# Patient Record
Sex: FEMALE | Race: BLACK OR AFRICAN AMERICAN | NOT HISPANIC OR LATINO | Employment: OTHER | URBAN - METROPOLITAN AREA
[De-identification: names, ages, dates, MRNs, and addresses within clinical notes are randomized per-mention and may not be internally consistent; named-entity substitution may affect disease eponyms.]

---

## 2017-03-18 ENCOUNTER — HOSPITAL ENCOUNTER (EMERGENCY)
Facility: HOSPITAL | Age: 77
Discharge: HOME | End: 2017-03-18
Attending: FAMILY MEDICINE
Payer: MEDICARE

## 2017-03-18 VITALS
WEIGHT: 228 LBS | HEIGHT: 64 IN | BODY MASS INDEX: 38.93 KG/M2 | HEART RATE: 84 BPM | TEMPERATURE: 98 F | OXYGEN SATURATION: 99 % | DIASTOLIC BLOOD PRESSURE: 83 MMHG | SYSTOLIC BLOOD PRESSURE: 161 MMHG | RESPIRATION RATE: 20 BRPM

## 2017-03-18 DIAGNOSIS — R10.31 RIGHT LOWER QUADRANT ABDOMINAL PAIN: Primary | ICD-10-CM

## 2017-03-18 LAB
ALBUMIN SERPL BCP-MCNC: 4.1 G/DL
ALP SERPL-CCNC: 131 IU/L
ALT SERPL W/O P-5'-P-CCNC: 32 IU/L
AMYLASE SERPL-CCNC: 91 U/L
ANION GAP SERPL CALC-SCNC: 10 MMOL/L
AST SERPL-CCNC: 27 IU/L
BASOPHILS # BLD AUTO: 0.03 K/UL
BASOPHILS NFR BLD: 0.7 %
BILIRUB SERPL-MCNC: 0.6 MG/DL
BILIRUB UR QL STRIP: NEGATIVE
BUN SERPL-MCNC: 13 MG/DL
CALCIUM SERPL-MCNC: 9.1 MG/DL
CHLORIDE SERPL-SCNC: 99 MMOL/L
CLARITY UR REFRACT.AUTO: ABNORMAL
CO2 SERPL-SCNC: 33 MMOL/L
COLOR UR AUTO: YELLOW
CREAT SERPL-MCNC: 0.55 MG/DL
DIFFERENTIAL METHOD: ABNORMAL
EOSINOPHIL # BLD AUTO: 0.1 K/UL
EOSINOPHIL NFR BLD: 3.2 %
ERYTHROCYTE [DISTWIDTH] IN BLOOD BY AUTOMATED COUNT: 15.1 %
EST. GFR  (AFRICAN AMERICAN): >60 ML/MIN/1.73 M^2
EST. GFR  (NON AFRICAN AMERICAN): >60 ML/MIN/1.73 M^2
GLUCOSE SERPL-MCNC: 156 MG/DL
GLUCOSE UR QL STRIP: NEGATIVE
HCT VFR BLD AUTO: 46 %
HGB BLD-MCNC: 14.2 G/DL
HGB UR QL STRIP: NEGATIVE
KETONES UR QL STRIP: NEGATIVE
LEUKOCYTE ESTERASE UR QL STRIP: NEGATIVE
LIPASE SERPL-CCNC: 34 U/L
LYMPHOCYTES # BLD AUTO: 1 K/UL
LYMPHOCYTES NFR BLD: 21.8 %
MCH RBC QN AUTO: 24.6 PG
MCHC RBC AUTO-ENTMCNC: 30.9 %
MCV RBC AUTO: 80 FL
MONOCYTES # BLD AUTO: 0.4 K/UL
MONOCYTES NFR BLD: 8.8 %
NEUTROPHILS # BLD AUTO: 2.9 K/UL
NEUTROPHILS NFR BLD: 65.3 %
NITRITE UR QL STRIP: NEGATIVE
PH UR STRIP: 8 [PH] (ref 5–8)
PLATELET # BLD AUTO: 200 K/UL
PMV BLD AUTO: 11.7 FL
POTASSIUM SERPL-SCNC: 3.4 MMOL/L
PROT SERPL-MCNC: 7.6 G/DL
PROT UR QL STRIP: ABNORMAL
RBC # BLD AUTO: 5.78 M/UL
SODIUM SERPL-SCNC: 142 MMOL/L
SP GR UR STRIP: 1.01 (ref 1–1.03)
URN SPEC COLLECT METH UR: ABNORMAL
UROBILINOGEN UR STRIP-ACNC: NEGATIVE EU/DL
WBC # BLD AUTO: 4.44 K/UL

## 2017-03-18 PROCEDURE — 96374 THER/PROPH/DIAG INJ IV PUSH: CPT

## 2017-03-18 PROCEDURE — 63600175 PHARM REV CODE 636 W HCPCS

## 2017-03-18 PROCEDURE — 83690 ASSAY OF LIPASE: CPT

## 2017-03-18 PROCEDURE — 25500020 PHARM REV CODE 255: Performed by: FAMILY MEDICINE

## 2017-03-18 PROCEDURE — 81003 URINALYSIS AUTO W/O SCOPE: CPT

## 2017-03-18 PROCEDURE — 82150 ASSAY OF AMYLASE: CPT

## 2017-03-18 PROCEDURE — 25000003 PHARM REV CODE 250: Performed by: FAMILY MEDICINE

## 2017-03-18 PROCEDURE — 80053 COMPREHEN METABOLIC PANEL: CPT

## 2017-03-18 PROCEDURE — 85025 COMPLETE CBC W/AUTO DIFF WBC: CPT

## 2017-03-18 PROCEDURE — 99284 EMERGENCY DEPT VISIT MOD MDM: CPT | Mod: 25

## 2017-03-18 RX ORDER — LISINOPRIL 40 MG/1
40 TABLET ORAL DAILY
COMMUNITY
End: 2022-05-21

## 2017-03-18 RX ORDER — ONDANSETRON 2 MG/ML
4 INJECTION INTRAMUSCULAR; INTRAVENOUS
Status: COMPLETED | OUTPATIENT
Start: 2017-03-18 | End: 2017-03-18

## 2017-03-18 RX ORDER — DICYCLOMINE HYDROCHLORIDE 20 MG/1
20 TABLET ORAL 4 TIMES DAILY PRN
Qty: 15 TABLET | Refills: 0 | Status: SHIPPED | OUTPATIENT
Start: 2017-03-18 | End: 2017-04-17

## 2017-03-18 RX ORDER — ONDANSETRON 2 MG/ML
INJECTION INTRAMUSCULAR; INTRAVENOUS
Status: COMPLETED
Start: 2017-03-18 | End: 2017-03-18

## 2017-03-18 RX ORDER — HYDROCHLOROTHIAZIDE 12.5 MG/1
12.5 TABLET ORAL DAILY
COMMUNITY
End: 2018-06-03

## 2017-03-18 RX ORDER — DICYCLOMINE HYDROCHLORIDE 10 MG/1
20 CAPSULE ORAL
Status: COMPLETED | OUTPATIENT
Start: 2017-03-18 | End: 2017-03-18

## 2017-03-18 RX ORDER — AMLODIPINE BESYLATE 5 MG/1
10 TABLET ORAL DAILY
Status: ON HOLD | COMMUNITY
End: 2021-06-16

## 2017-03-18 RX ADMIN — ONDANSETRON 4 MG: 2 INJECTION INTRAMUSCULAR; INTRAVENOUS at 07:03

## 2017-03-18 RX ADMIN — IOHEXOL 100 ML: 350 INJECTION, SOLUTION INTRAVENOUS at 08:03

## 2017-03-18 RX ADMIN — IOHEXOL 30 ML: 300 INJECTION, SOLUTION INTRAVENOUS at 07:03

## 2017-03-18 RX ADMIN — DICYCLOMINE HYDROCHLORIDE 20 MG: 10 CAPSULE ORAL at 09:03

## 2017-03-18 NOTE — ED AVS SNAPSHOT
[facility]  [address] [place] [address]               Jayne You   3/18/2017  6:14 PM   ED    Description:  Female : 1940   Department:  [facility]           Your Care was Coordinated By:     Provider Role From To    Parvez Ball MD Attending Provider [phone] --      Reason for Visit     Abdominal Pain           Diagnoses this Visit        Comments    Right lower quadrant abdominal pain    -  Primary       ED Disposition     None           To Do List           Follow-up Information     Follow up with Thaddeus Cross MD. Schedule an appointment as soon as possible for a visit in 2 days.    Specialty:  Internal Medicine    Why:  If symptoms worsen    Contact information:    [address] [place] [address]  [phone]         These Medications        Disp Refills Start End    dicyclomine (BENTYL) 20 mg tablet 15 tablet 0 3/18/2017 2017    Take 1 tablet (20 mg total) by mouth 4 (four) times daily as needed (CRAMPS). - Oral      [facility] On [facility]     [facility] -  Assistance  Registered nurses in the [facility] provide clinical advisement, health education, appointment booking, and other advisory services.  Call for this free service at [phone].             Medications           Message regarding Medications     Verify the changes and/or additions to your medication regime listed below are the same as discussed with your clinician today.  If any of these changes or additions are incorrect, please notify your healthcare provider.        START taking these NEW medications        Refills    dicyclomine (BENTYL) 20 mg tablet 0    Sig: Take 1 tablet (20 mg total) by mouth 4 (four) times daily as needed (CRAMPS).    Class: Print    Route: Oral      These medications were administered today        Dose Freq    omnipaque 300 iohexol 30 mL 30 mL IMG once as needed    Sig:  "Take 30 mLs by mouth ONCE PRN for contrast.    Class: Normal    Route: Oral    omnipaque 350 iohexol 100 mL 100 mL IMG once as needed    Sig: Inject 100 mLs into the vein ONCE PRN for contrast.    Class: Normal    Route: Intravenous    ondansetron injection 4 mg 4 mg ED 1 Time    Sig: Inject 4 mg into the vein ED 1 Time.    Class: Normal    Route: Intravenous    ondansetron 4 mg/2 mL injection      Notes to Pharmacy: Created by cabinet override    dicyclomine capsule 20 mg 20 mg ED 1 Time    Sig: Take 2 capsules (20 mg total) by mouth ED 1 Time.    Class: Normal    Route: Oral           Verify that the below list of medications is an accurate representation of the medications you are currently taking.  If none reported, the list may be blank. If incorrect, please contact your healthcare provider. Carry this list with you in case of emergency.           Current Medications     amlodipine (NORVASC) 5 MG tablet Take 5 mg by mouth once daily.    hydrochlorothiazide (HYDRODIURIL) 12.5 MG Tab Take 12.5 mg by mouth once daily.    lisinopril (PRINIVIL,ZESTRIL) 40 MG tablet Take 40 mg by mouth once daily.    dicyclomine (BENTYL) 20 mg tablet Take 1 tablet (20 mg total) by mouth 4 (four) times daily as needed (CRAMPS).    dicyclomine capsule 20 mg Take 2 capsules (20 mg total) by mouth ED 1 Time.           Clinical Reference Information           Your Vitals Were     BP Pulse Temp Resp Height Weight    161/83 84 98.1 °F (36.7 °C) 20 5' 4" (1.626 m) 103.4 kg (228 lb)    SpO2 BMI             99% 39.14 kg/m2         Allergies as of 3/18/2017        Reactions    Codeine Nausea And Vomiting      Immunizations Administered on Date of Encounter - 3/18/2017     None      ED Micro, Lab, POCT     Start Ordered       Status Ordering Provider    03/18/17 1912 03/18/17 1912  CBC auto differential  STAT      Final result     03/18/17 1912 03/18/17 1912  Comprehensive metabolic panel  STAT      Final result     03/18/17 1912 03/18/17 1912   "  STAT,   Status:  Canceled      Canceled     03/18/17 1912 03/18/17 1912  Amylase  STAT      Final result     03/18/17 1912 03/18/17 1912  Lipase  STAT      Final result     03/18/17 1848 03/18/17 1847  Urinalysis Clean Catch  STAT      Final result       ED Imaging Orders     Start Ordered       Status Ordering Provider    03/18/17 1912 03/18/17 1912  CT Abdomen Pelvis With Contrast  1 time imaging      Final result         Discharge Instructions         Abdominal Pain    Abdominal pain is pain in the stomach or belly area. Everyone has this pain from time to time. In many cases it goes away on its own. But abdominal pain can sometimes be due to a serious problem, such as appendicitis. So its important to know when to seek help.  Causes of abdominal pain  There are many possible causes of abdominal pain. Common causes in adults include:  · Constipation, diarrhea, or gas  · Stomach acid flowing back up into the esophagus (acid reflux or heartburn)  · Severe acid reflux, called GERD (gastroesophageal reflux disease)  · A sore in the lining of the stomach or small intestine (peptic ulcer)  · Inflammation of the gallbladder, liver, or pancreas  · Gallstones or kidney stones  · Appendicitis   · Intestinal blockage   · An internal organ pushing through a muscle or other tissue (hernia)  · Urinary tract infections  · In women, menstrual cramps, fibroids, or endometriosis  · Inflammation or infection of the intestines  Diagnosing the cause of abdominal pain  Your healthcare provider will do a physical exam help find the cause of your pain. If needed, tests will be ordered. Belly pain has many possible causes. So it can be hard to find the reason for your pain. Giving details about your pain can help. Tell your provider where and when you feel the pain, and what makes it better or worse. Also let your provider know if you have other symptoms such as:  · Fever  · Tiredness  · Upset stomach (nausea)  · Vomiting  · Changes in  bathroom habits  Treating abdominal pain  Some causes of pain need emergency medical treatment right away. These include appendicitis or a bowel blockage. Other problems can be treated with rest, fluids, or medicines. Your healthcare provider can give you specific instructions for treatment or self-care based on what is causing your pain.  If you have vomiting or diarrhea, sip water or other clear fluids. When you are ready to eat solid foods again, start with small amounts of easy-to-digest, low-fat foods. These include apple sauce, toast, or crackers.   When to seek medical care  Call 911 or go to the hospital right away if you:  · Cant pass stool and are vomiting  · Are vomiting blood or have bloody diarrhea or black, tarry diarrhea  · Have chest, neck, or shoulder pain  · Feel like you might pass out  · Have pain in your shoulder blades with nausea  · Have sudden, severe belly pain  · Have new, severe pain unlike any you have felt before  · Have a belly that is rigid, hard, and tender to touch  Call your healthcare provider if you have:  · Pain for more than 5 days  · Bloating for more than 2 days  · Diarrhea for more than 5 days  · A fever of 100.4°F (38.0°C) or higher, or as directed by your provider  · Pain that gets worse  · Weight loss for no reason  · Continued lack of appetite  · Blood in your stool  How to prevent abdominal pain  Here are some tips to help prevent abdominal pain:  · Eat smaller amounts of food at one time.  · Avoid greasy, fried, or other high-fat foods.  · Avoid foods that give you gas.  · Exercise regularly.  · Drink plenty of fluids.  To help prevent GERD symptoms:  · Quit smoking.  · Reduce alcohol and certain foods that increase stomach acid.  · Avoid aspirin and over-the-counter pain and fever medicines (NSAIDS or nonsteroidal anti-inflammatory drugs), if possible  · Lose extra weight.  · Finish eating at least 2 hours before you go to bed or lie down.  · Raise the head of your  bed.  Date Last Reviewed: 7/1/2016  © 7444-9367 [employer]. [address] [place] [address], [place] [address]. All rights reserved. This information is not intended as a substitute for professional medical care. Always follow your healthcare professional's instructions.          [facility] Sign-Up     Activating your [facility] account is as easy as 1-2-3!     1) Visit [email or web address], select Sign Up Now, enter this activation code and your date of birth, then select Next.  [email or web address]  Expires: 5/2/2017  9:31 PM      2) Create a username and password to use when you visit [facility] in the future and select a security question in case you lose your password and select Next.    3) Enter your e-mail address and click Sign Up!    Additional Information  If you have questions, please e-mail [email or web address] or call [phone] to talk to our [facility] staff. Remember, [facility] is NOT to be used for urgent needs. For medical emergencies, dial 911.          [facility] complies with applicable Federal civil rights laws and does not discriminate on the basis of race, color, national origin, age, disability, or sex.        Language Assistance Services     ATTENTION: Language assistance services are available, free of charge. Please call [phone].      ATENCIÓN: Si habla español, tiene a moya disposición servicios gratuitos de asistencia lingüística. Llame al [phone].     CHÚ Ý: N?u b?n nói Ti?ng Vi?t, có các d?ch v? h? tr? ngôn ng? mi?n phí dành cho b?n. G?i s? [phone].

## 2017-03-18 NOTE — ED TRIAGE NOTES
pt reports feeling as if she needs to have a BM and is also nauseated. She reports last BM this am and one episode of vomiting this am

## 2017-03-19 NOTE — PROVIDER PROGRESS NOTES - EMERGENCY DEPT.
Encounter Date: 3/18/2017    ED Physician Progress Notes        Physician Note:   Laboratory results an x-ray CAT scan discussed with the patient and family.  She is comfortable as long as he sits still.

## 2017-03-19 NOTE — ED PROVIDER NOTES
Encounter Date: 3/18/2017       History     Chief Complaint   Patient presents with    Abdominal Pain     pt reports feeling as if she needs to have a BM and is also nauseated. She reports last BM this am and one episode of vomiting this am      Review of patient's allergies indicates:   Allergen Reactions    Codeine Nausea And Vomiting     Patient is a 77 y.o. female presenting with the following complaint: abdominal pain. The history is provided by the patient. No  was used.   Abdominal Pain   The current episode started yesterday. The onset of the illness was gradual. The abdominal pain is located in the RLQ. The abdominal pain does not radiate. The severity of the abdominal pain is 3/10. The abdominal pain is relieved by nothing. The abdominal pain is exacerbated by certain positions. The other symptoms of the illness include nausea and vomiting (Once). The other symptoms of the illness do not include fever, jaundice, melena, diarrhea, dysuria, hematemesis, hematochezia or vaginal bleeding.   Nausea began today.   The vomiting began today. Vomiting occurred once. The emesis contains stomach contents.   The patient states that she believes she is currently not pregnant. The patient has not had a change in bowel habit. Risk factors for an acute abdominal problem include being elderly. Symptoms associated with the illness do not include chills, anorexia, diaphoresis, heartburn, constipation, urgency, hematuria, frequency or back pain.     Patient had onset yesterday of right lower quadrant pain.  Pain was sharp in nature.  Pain is worse when she sits up or moves around but is better when she lays still.  She had nausea and vomiting ×1.  [place] she has a pressure sensation when she urinates or has a bowel movement but no dysuria urgency or frequency.  No hematuria or hematochezia.  No fever or chills.  No prior similar difficulty.  She came in today because the pain was getting worse and she  had some diaphoresis.  Past Medical History:   Diagnosis Date    Hypertension      Past Surgical History:   Procedure Laterality Date    BACK SURGERY      TOTAL HIP ARTHROPLASTY Right      History reviewed. No pertinent family history.  Social History   Substance Use Topics    Smoking status: Never Smoker    Smokeless tobacco: None    Alcohol use None     Review of Systems   Constitutional: Negative for chills, diaphoresis and fever.   Gastrointestinal: Positive for abdominal pain, nausea and vomiting (Once). Negative for anorexia, constipation, diarrhea, heartburn, hematemesis, hematochezia, jaundice and melena.   Genitourinary: Negative for dysuria, frequency, hematuria, urgency and vaginal bleeding.   Musculoskeletal: Negative for back pain.       Physical Exam   Initial Vitals   BP Pulse Resp Temp SpO2   -- 03/18/17 1819 03/18/17 1819 03/18/17 1819 --    96 18 97.6 °F (36.4 °C)      Physical Exam    Nursing note and vitals reviewed.  Constitutional: She appears well-developed and well-nourished.   Obese female in no acute distress.   HENT:   Head: Normocephalic and atraumatic.   Right Ear: External ear normal.   Left Ear: External ear normal.   Nose: Nose normal.   Mouth/Throat: Oropharynx is clear and moist.   Eyes: Conjunctivae and EOM are normal. Pupils are equal, round, and reactive to light.   Neck: Normal range of motion. Neck supple.   Cardiovascular: Normal rate, regular rhythm and normal heart sounds.   Pulmonary/Chest: Breath sounds normal. No respiratory distress.   Abdominal: Soft. Bowel sounds are normal. She exhibits no distension and no mass. There is tenderness. There is no rebound and no guarding.       Musculoskeletal: Normal range of motion.   Neurological: She is alert and oriented to person, place, and time. She has normal strength.   Skin: Skin is warm and dry.   Psychiatric: She has a normal mood and affect.         ED Course   Procedures  Labs Reviewed   URINALYSIS - Abnormal;  Notable for the following:        Result Value    Appearance, UA Hazy (*)     Protein, UA Trace (*)     All other components within normal limits   CBC W/ AUTO DIFFERENTIAL   COMPREHENSIVE METABOLIC PANEL   URINALYSIS   AMYLASE   LIPASE                               ED Course     Clinical Impression:   The encounter diagnosis was Right lower quadrant abdominal pain.          Parvez Ball MD  03/18/17 6547

## 2017-03-19 NOTE — ED NOTES
Patient is resting comfortably. Denies nausea at this time. Still with abdominal pain. In no acute distress at this time

## 2017-03-19 NOTE — DISCHARGE INSTRUCTIONS
Abdominal Pain    Abdominal pain is pain in the stomach or belly area. Everyone has this pain from time to time. In many cases it goes away on its own. But abdominal pain can sometimes be due to a serious problem, such as appendicitis. So its important to know when to seek help.  Causes of abdominal pain  There are many possible causes of abdominal pain. Common causes in adults include:  · Constipation, diarrhea, or gas  · Stomach acid flowing back up into the esophagus (acid reflux or heartburn)  · Severe acid reflux, called GERD (gastroesophageal reflux disease)  · A sore in the lining of the stomach or small intestine (peptic ulcer)  · Inflammation of the gallbladder, liver, or pancreas  · Gallstones or kidney stones  · Appendicitis   · Intestinal blockage   · An internal organ pushing through a muscle or other tissue (hernia)  · Urinary tract infections  · In women, menstrual cramps, fibroids, or endometriosis  · Inflammation or infection of the intestines  Diagnosing the cause of abdominal pain  Your healthcare provider will do a physical exam help find the cause of your pain. If needed, tests will be ordered. Belly pain has many possible causes. So it can be hard to find the reason for your pain. Giving details about your pain can help. Tell your provider where and when you feel the pain, and what makes it better or worse. Also let your provider know if you have other symptoms such as:  · Fever  · Tiredness  · Upset stomach (nausea)  · Vomiting  · Changes in bathroom habits  Treating abdominal pain  Some causes of pain need emergency medical treatment right away. These include appendicitis or a bowel blockage. Other problems can be treated with rest, fluids, or medicines. Your healthcare provider can give you specific instructions for treatment or self-care based on what is causing your pain.  If you have vomiting or diarrhea, sip water or other clear fluids. When you are ready to eat solid foods again,  start with small amounts of easy-to-digest, low-fat foods. These include apple sauce, toast, or crackers.   When to seek medical care  Call 911 or go to the hospital right away if you:  · Cant pass stool and are vomiting  · Are vomiting blood or have bloody diarrhea or black, tarry diarrhea  · Have chest, neck, or shoulder pain  · Feel like you might pass out  · Have pain in your shoulder blades with nausea  · Have sudden, severe belly pain  · Have new, severe pain unlike any you have felt before  · Have a belly that is rigid, hard, and tender to touch  Call your healthcare provider if you have:  · Pain for more than 5 days  · Bloating for more than 2 days  · Diarrhea for more than 5 days  · A fever of 100.4°F (38.0°C) or higher, or as directed by your provider  · Pain that gets worse  · Weight loss for no reason  · Continued lack of appetite  · Blood in your stool  How to prevent abdominal pain  Here are some tips to help prevent abdominal pain:  · Eat smaller amounts of food at one time.  · Avoid greasy, fried, or other high-fat foods.  · Avoid foods that give you gas.  · Exercise regularly.  · Drink plenty of fluids.  To help prevent GERD symptoms:  · Quit smoking.  · Reduce alcohol and certain foods that increase stomach acid.  · Avoid aspirin and over-the-counter pain and fever medicines (NSAIDS or nonsteroidal anti-inflammatory drugs), if possible  · Lose extra weight.  · Finish eating at least 2 hours before you go to bed or lie down.  · Raise the head of your bed.  Date Last Reviewed: 7/1/2016  © 8203-6428 [employer]. [address] [place] [address], [place] [address]. All rights reserved. This information is not intended as a substitute for professional medical care. Always follow your healthcare professional's instructions.

## 2018-06-03 ENCOUNTER — HOSPITAL ENCOUNTER (EMERGENCY)
Facility: HOSPITAL | Age: 78
Discharge: HOME | End: 2018-06-04
Attending: EMERGENCY MEDICINE
Payer: MEDICARE

## 2018-06-03 DIAGNOSIS — N30.00 ACUTE CYSTITIS WITHOUT HEMATURIA: Primary | ICD-10-CM

## 2018-06-03 DIAGNOSIS — R10.9 ABDOMINAL PAIN: ICD-10-CM

## 2018-06-03 LAB
ALBUMIN SERPL BCP-MCNC: 4 G/DL
ALP SERPL-CCNC: 113 U/L
ALT SERPL W/O P-5'-P-CCNC: 28 U/L
ANION GAP SERPL CALC-SCNC: 12 MMOL/L
AST SERPL-CCNC: 27 U/L
BACTERIA #/AREA URNS AUTO: ABNORMAL /HPF
BASOPHILS # BLD AUTO: 0.02 K/UL
BASOPHILS NFR BLD: 0.3 %
BILIRUB SERPL-MCNC: 0.3 MG/DL
BILIRUB UR QL STRIP: NEGATIVE
BUN SERPL-MCNC: 15 MG/DL
CALCIUM SERPL-MCNC: 9.1 MG/DL
CAOX CRY UR QL COMP ASSIST: ABNORMAL
CHLORIDE SERPL-SCNC: 102 MMOL/L
CLARITY UR REFRACT.AUTO: CLEAR
CO2 SERPL-SCNC: 30 MMOL/L
COLOR UR AUTO: ABNORMAL
CREAT SERPL-MCNC: 0.72 MG/DL
DIFFERENTIAL METHOD: ABNORMAL
EOSINOPHIL # BLD AUTO: 0.2 K/UL
EOSINOPHIL NFR BLD: 2.7 %
ERYTHROCYTE [DISTWIDTH] IN BLOOD BY AUTOMATED COUNT: 15.1 %
EST. GFR  (AFRICAN AMERICAN): >60 ML/MIN/1.73 M^2
EST. GFR  (NON AFRICAN AMERICAN): >60 ML/MIN/1.73 M^2
GLUCOSE SERPL-MCNC: 149 MG/DL
GLUCOSE UR QL STRIP: NEGATIVE
HCT VFR BLD AUTO: 44.6 %
HGB BLD-MCNC: 13.7 G/DL
HGB UR QL STRIP: ABNORMAL
HYALINE CASTS UR QL AUTO: 0 /LPF
KETONES UR QL STRIP: ABNORMAL
LEUKOCYTE ESTERASE UR QL STRIP: ABNORMAL
LIPASE SERPL-CCNC: 47 U/L
LYMPHOCYTES # BLD AUTO: 2.5 K/UL
LYMPHOCYTES NFR BLD: 36.5 %
MCH RBC QN AUTO: 24.6 PG
MCHC RBC AUTO-ENTMCNC: 30.7 G/DL
MCV RBC AUTO: 80 FL
MICROSCOPIC COMMENT: ABNORMAL
MONOCYTES # BLD AUTO: 0.6 K/UL
MONOCYTES NFR BLD: 8.8 %
NEUTROPHILS # BLD AUTO: 3.5 K/UL
NEUTROPHILS NFR BLD: 51.6 %
NITRITE UR QL STRIP: NEGATIVE
PH UR STRIP: 6 [PH] (ref 5–8)
PLATELET # BLD AUTO: 174 K/UL
PMV BLD AUTO: 12.8 FL
POTASSIUM SERPL-SCNC: 3.3 MMOL/L
PROT SERPL-MCNC: 7.3 G/DL
PROT UR QL STRIP: ABNORMAL
RBC # BLD AUTO: 5.58 M/UL
RBC #/AREA URNS AUTO: 0 /HPF (ref 0–4)
SODIUM SERPL-SCNC: 144 MMOL/L
SP GR UR STRIP: 1.02 (ref 1–1.03)
URN SPEC COLLECT METH UR: ABNORMAL
UROBILINOGEN UR STRIP-ACNC: 1 EU/DL
WBC # BLD AUTO: 6.79 K/UL
WBC #/AREA URNS AUTO: 0 /HPF (ref 0–5)

## 2018-06-03 PROCEDURE — 85025 COMPLETE CBC W/AUTO DIFF WBC: CPT

## 2018-06-03 PROCEDURE — 25000003 PHARM REV CODE 250: Performed by: PHYSICIAN ASSISTANT

## 2018-06-03 PROCEDURE — 99284 EMERGENCY DEPT VISIT MOD MDM: CPT | Mod: 25

## 2018-06-03 PROCEDURE — 80053 COMPREHEN METABOLIC PANEL: CPT

## 2018-06-03 PROCEDURE — 83690 ASSAY OF LIPASE: CPT

## 2018-06-03 PROCEDURE — 81000 URINALYSIS NONAUTO W/SCOPE: CPT

## 2018-06-03 RX ORDER — ONDANSETRON 4 MG/1
4 TABLET, ORALLY DISINTEGRATING ORAL
Status: COMPLETED | OUTPATIENT
Start: 2018-06-03 | End: 2018-06-03

## 2018-06-03 RX ORDER — GABAPENTIN 600 MG/1
600 TABLET ORAL 3 TIMES DAILY
COMMUNITY
End: 2020-10-27 | Stop reason: CLARIF

## 2018-06-03 RX ORDER — GLIPIZIDE 5 MG/1
5 TABLET, FILM COATED, EXTENDED RELEASE ORAL
Status: ON HOLD | COMMUNITY
End: 2021-06-16

## 2018-06-03 RX ORDER — MELOXICAM 7.5 MG/1
7.5 TABLET ORAL 2 TIMES DAILY
COMMUNITY
End: 2020-10-27 | Stop reason: CLARIF

## 2018-06-03 RX ORDER — BUPRENORPHINE HCL 150 MCG
FILM, MEDICATED (EA) BUCCAL 2 TIMES DAILY PRN
Status: ON HOLD | COMMUNITY
End: 2020-11-06 | Stop reason: HOSPADM

## 2018-06-03 RX ADMIN — ONDANSETRON 4 MG: 4 TABLET, ORALLY DISINTEGRATING ORAL at 10:06

## 2018-06-04 VITALS
TEMPERATURE: 98 F | RESPIRATION RATE: 18 BRPM | SYSTOLIC BLOOD PRESSURE: 136 MMHG | WEIGHT: 259.88 LBS | HEART RATE: 81 BPM | DIASTOLIC BLOOD PRESSURE: 82 MMHG | BODY MASS INDEX: 44.37 KG/M2 | HEIGHT: 64 IN | OXYGEN SATURATION: 98 %

## 2018-06-04 RX ORDER — ONDANSETRON 4 MG/1
4 TABLET, FILM COATED ORAL EVERY 8 HOURS PRN
Qty: 12 TABLET | Refills: 0 | Status: SHIPPED | OUTPATIENT
Start: 2018-06-04 | End: 2020-10-27 | Stop reason: CLARIF

## 2018-06-04 RX ORDER — CIPROFLOXACIN 500 MG/1
500 TABLET ORAL 2 TIMES DAILY
Qty: 20 TABLET | Refills: 0 | Status: SHIPPED | OUTPATIENT
Start: 2018-06-04 | End: 2018-06-14

## 2018-06-04 NOTE — ED NOTES
Pt informed of approx wait time for lab and x-ray results. Understanding verbalized. Pt denies needs at this time. Television remote provided and call light remains in reach. Pt instructed to notify staff should need for assistance arise. Will continue to monitor pt.

## 2018-06-04 NOTE — ED NOTES
Urine specimen cup provided. Pt reports that she is unable to provide sample at this time. Pt instructed to notify staff when need to urinate arises.

## 2018-06-04 NOTE — ED PROVIDER NOTES
Encounter Date: 6/3/2018       History     Chief Complaint   Patient presents with    Abdominal Pain     Bilateral flank pain starting two days ago that radiates to the lower quads of abdomen/groin with N/V, no diarrhea or reported fevers at home, denies any dysuria/hematuria    Flank Pain     Patient presents with lower abdominal pain which began yesterday. She states pain is in her lower abdomen and radiates to her groin. She reports vomiting once yesterday and once this morning. She denies diarrhea or fever. Patient reports having a bowel movement today but states she is on pain medication and states is constipates her. She reports only one abdominal surgery in the past which was an appendectomy.           Review of patient's allergies indicates:   Allergen Reactions    Codeine Nausea And Vomiting     Past Medical History:   Diagnosis Date    Diabetes mellitus     Hypertension     Lumbar herniated disc      Past Surgical History:   Procedure Laterality Date    BACK SURGERY      TOTAL HIP ARTHROPLASTY Right      No family history on file.  Social History   Substance Use Topics    Smoking status: Never Smoker    Smokeless tobacco: Not on file    Alcohol use No     Review of Systems   Constitutional: Negative for chills and fever.   Respiratory: Negative for cough, chest tightness and shortness of breath.    Cardiovascular: Negative for chest pain.   Gastrointestinal: Positive for abdominal pain, nausea and vomiting. Negative for constipation and diarrhea.   Genitourinary: Negative for dysuria, flank pain and hematuria.   Neurological: Negative for dizziness, weakness, light-headedness and headaches.       Physical Exam     Initial Vitals [06/03/18 2156]   BP Pulse Resp Temp SpO2   (!) 153/80 96 20 98.2 °F (36.8 °C) 97 %      MAP       104.33         Physical Exam    Nursing note and vitals reviewed.  Constitutional: She appears well-developed and well-nourished.   HENT:   Head: Normocephalic and  atraumatic.   Nose: Nose normal.   Mouth/Throat: Oropharynx is clear and moist.   Eyes: Conjunctivae and EOM are normal. Pupils are equal, round, and reactive to light.   Neck: Normal range of motion. Neck supple.   Cardiovascular: Normal rate, regular rhythm and normal heart sounds.   Pulmonary/Chest: Breath sounds normal. No respiratory distress.   Abdominal: Soft. Bowel sounds are normal. There is tenderness in the suprapubic area and left lower quadrant. There is no guarding and no CVA tenderness.   Musculoskeletal: Normal range of motion.   Neurological: She is alert and oriented to person, place, and time.   Skin: Skin is warm. Capillary refill takes less than 2 seconds.         ED Course   Procedures  Labs Reviewed   CBC W/ AUTO DIFFERENTIAL   COMPREHENSIVE METABOLIC PANEL   URINALYSIS   LIPASE             Medical Decision Making:   Initial Assessment:   Patient presents with lower abdominal pain which began yesterday. She states pain is in her lower abdomen and radiates to her groin. She reports vomiting once yesterday and once this morning. She denies diarrhea or fever. Patient reports having a bowel movement today but states she is on pain medication and states is constipates her. She reports only one abdominal surgery in the past which was an appendectomy. On exam, patient is noted to have lower abdominal pain without radiation to bilateral flank. No guarding noted  Differential Diagnosis:   UTI, pyelonephritis, SBO  Clinical Tests:   Lab Tests: Ordered and Reviewed  Radiological Study: Ordered and Reviewed                      Clinical Impression:   The primary encounter diagnosis was Acute cystitis without hematuria. A diagnosis of Abdominal pain was also pertinent to this visit.    No orders to display       Disposition:   Disposition: Discharged  Condition: Stable                        Trena Perdomo MD  06/04/18 0043

## 2019-06-26 NOTE — H&P
Subjective:     Intolerable pain left knee. Requesting the same success as we had on the right knee. Admitted for left knee replacement.  Right knee doing well    There are no active problems to display for this patient.    Past Medical History:   Diagnosis Date    Diabetes mellitus     Hypertension     Lumbar herniated disc       Past Surgical History:   Procedure Laterality Date    BACK SURGERY      TOTAL HIP ARTHROPLASTY Right       No medications prior to admission.     Review of patient's allergies indicates:   Allergen Reactions    Codeine Nausea And Vomiting      Social History     Tobacco Use    Smoking status: Never Smoker   Substance Use Topics    Alcohol use: No      No family history on file.   Review of Systems  Pertinent items are noted in HPI.    Objective:     No data found.  Heart regular lungs clear crepitance tenderness left knee significant limp    Imaging Review  Tricompartmental degenerative changes per formation    Assessment:     There are no hospital problems to display for this patient.      Plan:     The various methods of treatment have been discussed with the patient and family.   After consideration of risks, benefits and other options for treatment, the patient has consented to surgical interventions (significant risk discussed hopefully same success as we had on the other side).  Questions were answered and Pre-op teaching was done by me.

## 2019-07-09 ENCOUNTER — ANESTHESIA EVENT (OUTPATIENT)
Dept: SURGERY | Facility: OTHER | Age: 79
End: 2019-07-09
Payer: MEDICARE

## 2019-07-09 ENCOUNTER — HOSPITAL ENCOUNTER (OUTPATIENT)
Dept: PREADMISSION TESTING | Facility: OTHER | Age: 79
Discharge: HOME | End: 2019-07-09
Attending: ORTHOPAEDIC SURGERY
Payer: MEDICARE

## 2019-07-09 VITALS
DIASTOLIC BLOOD PRESSURE: 76 MMHG | BODY MASS INDEX: 44.22 KG/M2 | TEMPERATURE: 99 F | WEIGHT: 259 LBS | HEART RATE: 100 BPM | OXYGEN SATURATION: 95 % | SYSTOLIC BLOOD PRESSURE: 133 MMHG | HEIGHT: 64 IN

## 2019-07-09 RX ORDER — SODIUM CHLORIDE, SODIUM LACTATE, POTASSIUM CHLORIDE, CALCIUM CHLORIDE 600; 310; 30; 20 MG/100ML; MG/100ML; MG/100ML; MG/100ML
INJECTION, SOLUTION INTRAVENOUS CONTINUOUS
Status: CANCELLED | OUTPATIENT
Start: 2019-07-09

## 2019-07-09 RX ORDER — PREGABALIN 75 MG/1
75 CAPSULE ORAL
Status: CANCELLED | OUTPATIENT
Start: 2019-07-09 | End: 2019-07-09

## 2019-07-09 RX ORDER — CYCLOBENZAPRINE HCL 10 MG
10 TABLET ORAL NIGHTLY
COMMUNITY
End: 2020-10-27 | Stop reason: CLARIF

## 2019-07-09 RX ORDER — ALBUTEROL SULFATE 0.83 MG/ML
2.5 SOLUTION RESPIRATORY (INHALATION)
Status: CANCELLED | OUTPATIENT
Start: 2019-07-09 | End: 2019-07-09

## 2019-07-09 RX ORDER — FUROSEMIDE 20 MG/1
20 TABLET ORAL DAILY PRN
Status: ON HOLD | COMMUNITY
End: 2021-06-16

## 2019-07-09 RX ORDER — ACETAMINOPHEN 500 MG
1000 TABLET ORAL
Status: CANCELLED | OUTPATIENT
Start: 2019-07-09 | End: 2019-07-09

## 2019-07-09 RX ORDER — ERGOCALCIFEROL 1.25 MG/1
50000 CAPSULE ORAL
Status: ON HOLD | COMMUNITY
End: 2021-06-16

## 2019-07-09 RX ORDER — LIDOCAINE HYDROCHLORIDE 10 MG/ML
0.5 INJECTION, SOLUTION EPIDURAL; INFILTRATION; INTRACAUDAL; PERINEURAL ONCE
Status: CANCELLED | OUTPATIENT
Start: 2019-07-09 | End: 2019-07-09

## 2019-07-09 NOTE — ANESTHESIA PREPROCEDURE EVALUATION
07/09/2019  Jayne You is a 79 y.o., female.    Anesthesia Evaluation    I have reviewed the Patient Summary Reports.    I have reviewed the Nursing Notes.   I have reviewed the Medications.     Review of Systems  Anesthesia Hx:  History of prior surgery of interest to airway management or planning: Previous anesthesia: General 2006 Total Hip GA w Dr Mccormick with general anesthesia.  Denies Family Hx of Anesthesia complications.  Personal Hx of Anesthesia complications Slow To Awaken/Delayed Emergence   Social:  Non-Smoker    Hematology/Oncology:  Hematology Normal   Oncology Normal     EENT/Dental:EENT/Dental Normal   Cardiovascular:   Hypertension, well controlled    Pulmonary:   Sleep Apnea, CPAP    Renal/:  Renal/ Normal     Hepatic/GI:  Hepatic/GI Normal    Musculoskeletal:   Arthritis  3 prev back surg Spine Disorders: lumbar Degenerative disease and Disc disease    Neurological:   Chronic Pain Syndrome   Endocrine:   Diabetes, type 2    Dermatological:  Skin Normal        Physical Exam  General:  Morbid Obesity    Airway/Jaw/Neck:  Airway Findings: Mouth Opening: Small, but > 3cm Tongue: Normal  General Airway Assessment: Adult, Possible difficult mask airway, Possible difficult intubation  Mallampati: III  TM Distance: 4 - 6 cm      Dental:  Dental Findings: Periodontal disease, Mild        Mental Status:  Mental Status Findings:  Cooperative, Alert and Oriented         Anesthesia Plan  Type of Anesthesia, risks & benefits discussed:  Anesthesia Type:  spinal, general  Patient's Preference:   Intra-op Monitoring Plan: standard ASA monitors  Intra-op Monitoring Plan Comments:   Post Op Pain Control Plan: multimodal analgesia and per primary service following discharge from PACU  Post Op Pain Control Plan Comments:   Induction:   IV  Beta Blocker:         Informed Consent: Patient  understands risks and agrees with Anesthesia plan.  Questions answered. Anesthesia consent signed with patient.  ASA Score: 3     Day of Surgery Review of History & Physical:    H&P update referred to the surgeon.     Anesthesia Plan Notes: Awaiting labs and clearance,may be tech difficult spinal,will attempt spinal,discussed GA if needed if cant do spinal        Ready For Surgery From Anesthesia Perspective.

## 2019-07-09 NOTE — PRE ADMISSION SCREENING
Pt reports was cleared for knee surgery by Dr. Cristobal at [facility].  States had labs, EKG, CXR, u/a.  Called [facility] ([phone]) to obtain same.

## 2019-07-09 NOTE — DISCHARGE INSTRUCTIONS
PRE-ADMIT TESTING -  [phone]    [address]  [facility]          Your surgery has been scheduled at [facility]. We are pleased to have the opportunity to serve you. For Further Information please call [phone].    On the day of surgery please report to the Information Desk on the 1st floor.    · CONTACT YOUR PHYSICIAN'S OFFICE THE DAY PRIOR TO YOUR SURGERY TO OBTAIN YOUR ARRIVAL TIME.     · The evening before surgery do not eat anything after 9 p.m. ( this includes hard candy, chewing gum and mints).  You may only have GATORADE, POWERADE AND WATER  from 9 p.m. until you leave your home.   DO NOT DRINK ANY LIQUIDS ON THE WAY TO THE HOSPITAL.      SPECIAL MEDICATION INSTRUCTIONS: TAKE medications checked off by the Anesthesiologist on your Medication List.    Angiogram Patients: Take medications as instructed by your physician, including aspirin.     Surgery Patients:    If you take ASPIRIN - Your PHYSICIAN/SURGEON will need to inform you IF/OR when you need to stop taking aspirin prior to your surgery.     Do Not take any medications containing IBUPROFEN.  Do Not Wear any make-up or dark nail polish   (especially eye make-up) to surgery. If you come to surgery with makeup on you will be required to remove the makeup or nail polish.    Do not shave your surgical area at least 5 days prior to your surgery. The surgical prep will be performed at the hospital according to Infection Control regulations.    Leave all valuables at home.   Do Not wear any jewelry or watches, including any metal in body piercings. Jewelry must be removed prior to coming to the hospital.  There is a possibility that rings that are unable to be removed may be cut off if they are on the surgical extremity.    Contact Lens must be removed before surgery. Either do not wear the contact lens or bring a case and solution for storage.  Please bring a container for eyeglasses or dentures as required.  Bring  any paperwork your physician has provided, such as consent forms,  history and physicals, doctor's orders, etc.   Bring comfortable clothes that are loose fitting to wear upon discharge. Take into consideration the type of surgery being performed.  Maintain your diet as advised per your physician the day prior to surgery.      Adequate rest the night before surgery is advised.   Park in the Parking lot behind the hospital or in the [facility] across the street from the parking lot. Parking is complimentary.  If you will be discharged the same day as your procedure, please arrange for a responsible adult to drive you home or to accompany you if traveling by taxi.   YOU WILL NOT BE PERMITTED TO DRIVE OR TO LEAVE THE HOSPITAL ALONE AFTER SURGERY.   It is strongly recommended that you arrange for someone to remain with you for the first 24 hrs following your surgery.    The Surgeon will speak to your family/visitor after your surgery regarding the outcome of your surgery and post op care.  The Surgeon may speak to you after your surgery, but there is a possibility you may not remember the details.  Please check with your family members regarding the conversation with the Surgeon.      Thank you for your cooperation.  The Staff of [facility].                Bathing Instructions with Hibiclens     Shower the evening before and morning of your procedure with Hibiclens:   Wash your face with water and your regular face wash/soap   Apply Hibiclens directly on your skin or on a wet washcloth and wash gently. When showering: Move away from the shower stream when applying Hibiclens to avoid rinsing off too soon.   Rinse thoroughly with warm water   Do not dilute Hibiclens         Dry off as usual, do not use any deodorant, powder, body lotions, perfume, after shave or cologne.

## 2019-07-16 ENCOUNTER — HOSPITAL ENCOUNTER (OUTPATIENT)
Dept: PREADMISSION TESTING | Facility: OTHER | Age: 79
Discharge: HOME | End: 2019-07-16
Attending: ORTHOPAEDIC SURGERY
Payer: MEDICARE

## 2019-07-16 DIAGNOSIS — Z01.818 PRE-OP TESTING: ICD-10-CM

## 2019-07-16 LAB
BILIRUB UR QL STRIP: NEGATIVE
CLARITY UR: CLEAR
COLOR UR: YELLOW
GLUCOSE UR QL STRIP: NEGATIVE
HGB UR QL STRIP: NEGATIVE
KETONES UR QL STRIP: NEGATIVE
LEUKOCYTE ESTERASE UR QL STRIP: NEGATIVE
NITRITE UR QL STRIP: NEGATIVE
PH UR STRIP: 7 [PH] (ref 5–8)
PROT UR QL STRIP: NEGATIVE
SP GR UR STRIP: 1.01 (ref 1–1.03)
URN SPEC COLLECT METH UR: NORMAL
UROBILINOGEN UR STRIP-ACNC: NEGATIVE EU/DL

## 2019-07-16 PROCEDURE — 81003 URINALYSIS AUTO W/O SCOPE: CPT

## 2019-07-16 NOTE — NURSING
Total Joint Replacement Questionnaire     Jayne You participated in Joint Class July 10    1. Have you ever had a Joint Replacement?   [x]Yes  [] No    2. How many stairs/steps do you have to enter your home? 0  When going up, on what side is the railing?  [] Left  [] Right  [] Bilateral  [x] None    3. Do you own any Durable Medical Equipment?   [x] Rolling Walker  [x] Standard Walker  [] Rollator  [x] Cane  [] Crutches  [] Bed Side Commode  [] Hip Kit  [] Tub Transfer Bench  [] Shower Chair     4. Have you used a Home Health Company before?   [x] Yes  [] No  If Yes, Name of Company: ?  Would you like to use this company again?   [] Yes  [] No  [x] Would you like to use your physician's preference?  [x] Yes  [] No    5. Have you arranged for someone to help you, for at least for 3 days, when you are discharged from the hospital?  [x] Yes  [] No  If Yes, Name(s) of person assisting: Liza Lemus  7/16/2019

## 2019-07-18 ENCOUNTER — HOSPITAL ENCOUNTER (OUTPATIENT)
Facility: OTHER | Age: 79
Discharge: HOME HEALTH | End: 2019-07-20
Attending: ORTHOPAEDIC SURGERY | Admitting: ORTHOPAEDIC SURGERY
Payer: MEDICARE

## 2019-07-18 ENCOUNTER — ANESTHESIA (OUTPATIENT)
Dept: SURGERY | Facility: OTHER | Age: 79
End: 2019-07-18
Payer: MEDICARE

## 2019-07-18 DIAGNOSIS — Z01.818 PRE-OP TESTING: Primary | ICD-10-CM

## 2019-07-18 DIAGNOSIS — M17.9 OA (OSTEOARTHRITIS) OF KNEE: ICD-10-CM

## 2019-07-18 LAB
POCT GLUCOSE: 135 MG/DL (ref 70–110)
POCT GLUCOSE: 167 MG/DL (ref 70–110)
POCT GLUCOSE: 209 MG/DL (ref 70–110)
POCT GLUCOSE: 240 MG/DL (ref 70–110)

## 2019-07-18 PROCEDURE — 25000003 PHARM REV CODE 250: Performed by: ANESTHESIOLOGY

## 2019-07-18 PROCEDURE — C1713 ANCHOR/SCREW BN/BN,TIS/BN: HCPCS | Performed by: ORTHOPAEDIC SURGERY

## 2019-07-18 PROCEDURE — 63600175 PHARM REV CODE 636 W HCPCS: Performed by: SPECIALIST

## 2019-07-18 PROCEDURE — P9045 ALBUMIN (HUMAN), 5%, 250 ML: HCPCS | Mod: JG | Performed by: NURSE ANESTHETIST, CERTIFIED REGISTERED

## 2019-07-18 PROCEDURE — 25000003 PHARM REV CODE 250: Performed by: NURSE ANESTHETIST, CERTIFIED REGISTERED

## 2019-07-18 PROCEDURE — 36000710: Performed by: ORTHOPAEDIC SURGERY

## 2019-07-18 PROCEDURE — 63600175 PHARM REV CODE 636 W HCPCS: Performed by: NURSE PRACTITIONER

## 2019-07-18 PROCEDURE — 27000190 HC CPAP FULL FACE MASK W/VALVE

## 2019-07-18 PROCEDURE — 37000008 HC ANESTHESIA 1ST 15 MINUTES: Performed by: ORTHOPAEDIC SURGERY

## 2019-07-18 PROCEDURE — 99900035 HC TECH TIME PER 15 MIN (STAT)

## 2019-07-18 PROCEDURE — 94761 N-INVAS EAR/PLS OXIMETRY MLT: CPT

## 2019-07-18 PROCEDURE — 25000003 PHARM REV CODE 250: Performed by: ORTHOPAEDIC SURGERY

## 2019-07-18 PROCEDURE — 94640 AIRWAY INHALATION TREATMENT: CPT

## 2019-07-18 PROCEDURE — 25000242 PHARM REV CODE 250 ALT 637 W/ HCPCS

## 2019-07-18 PROCEDURE — 97161 PT EVAL LOW COMPLEX 20 MIN: CPT

## 2019-07-18 PROCEDURE — 63600175 PHARM REV CODE 636 W HCPCS: Performed by: ORTHOPAEDIC SURGERY

## 2019-07-18 PROCEDURE — 97530 THERAPEUTIC ACTIVITIES: CPT

## 2019-07-18 PROCEDURE — 25000242 PHARM REV CODE 250 ALT 637 W/ HCPCS: Performed by: NURSE PRACTITIONER

## 2019-07-18 PROCEDURE — 94660 CPAP INITIATION&MGMT: CPT

## 2019-07-18 PROCEDURE — 36000711: Performed by: ORTHOPAEDIC SURGERY

## 2019-07-18 PROCEDURE — 27000221 HC OXYGEN, UP TO 24 HOURS

## 2019-07-18 PROCEDURE — 97116 GAIT TRAINING THERAPY: CPT | Mod: 59

## 2019-07-18 PROCEDURE — 25000003 PHARM REV CODE 250: Performed by: NURSE PRACTITIONER

## 2019-07-18 PROCEDURE — 71000033 HC RECOVERY, INTIAL HOUR: Performed by: ORTHOPAEDIC SURGERY

## 2019-07-18 PROCEDURE — 94002 VENT MGMT INPAT INIT DAY: CPT | Mod: 59

## 2019-07-18 PROCEDURE — 63600175 PHARM REV CODE 636 W HCPCS: Performed by: NURSE ANESTHETIST, CERTIFIED REGISTERED

## 2019-07-18 PROCEDURE — 71000039 HC RECOVERY, EACH ADD'L HOUR: Performed by: ORTHOPAEDIC SURGERY

## 2019-07-18 PROCEDURE — 25000003 PHARM REV CODE 250: Performed by: SPECIALIST

## 2019-07-18 PROCEDURE — 37000009 HC ANESTHESIA EA ADD 15 MINS: Performed by: ORTHOPAEDIC SURGERY

## 2019-07-18 PROCEDURE — 82962 GLUCOSE BLOOD TEST: CPT | Performed by: ORTHOPAEDIC SURGERY

## 2019-07-18 PROCEDURE — C9290 INJ, BUPIVACAINE LIPOSOME: HCPCS | Performed by: ORTHOPAEDIC SURGERY

## 2019-07-18 PROCEDURE — C1776 JOINT DEVICE (IMPLANTABLE): HCPCS | Performed by: ORTHOPAEDIC SURGERY

## 2019-07-18 PROCEDURE — 27201423 OPTIME MED/SURG SUP & DEVICES STERILE SUPPLY: Performed by: ORTHOPAEDIC SURGERY

## 2019-07-18 PROCEDURE — 94799 UNLISTED PULMONARY SVC/PX: CPT

## 2019-07-18 DEVICE — PATELLA COMPONENT3PEG 34X8.5MM: Type: IMPLANTABLE DEVICE | Site: KNEE | Status: FUNCTIONAL

## 2019-07-18 DEVICE — CEMENT REFOBACIN BCR 1X40: Type: IMPLANTABLE DEVICE | Site: KNEE | Status: FUNCTIONAL

## 2019-07-18 DEVICE — COMP FEM POST STBL 65MM L: Type: IMPLANTABLE DEVICE | Site: KNEE | Status: FUNCTIONAL

## 2019-07-18 DEVICE — INSERT TIBIAL BEARING 10 X 71/: Type: IMPLANTABLE DEVICE | Site: KNEE | Status: FUNCTIONAL

## 2019-07-18 DEVICE — TIBIAL TRAY CRUCIATE 71MM: Type: IMPLANTABLE DEVICE | Site: KNEE | Status: FUNCTIONAL

## 2019-07-18 RX ORDER — SODIUM CHLORIDE, SODIUM LACTATE, POTASSIUM CHLORIDE, CALCIUM CHLORIDE 600; 310; 30; 20 MG/100ML; MG/100ML; MG/100ML; MG/100ML
INJECTION, SOLUTION INTRAVENOUS CONTINUOUS
Status: DISCONTINUED | OUTPATIENT
Start: 2019-07-18 | End: 2019-07-18

## 2019-07-18 RX ORDER — FAMOTIDINE 20 MG/1
20 TABLET, FILM COATED ORAL 2 TIMES DAILY
Status: DISCONTINUED | OUTPATIENT
Start: 2019-07-18 | End: 2019-07-20 | Stop reason: HOSPADM

## 2019-07-18 RX ORDER — OXYCODONE HYDROCHLORIDE 5 MG/1
5 TABLET ORAL
Status: DISCONTINUED | OUTPATIENT
Start: 2019-07-18 | End: 2019-07-18 | Stop reason: HOSPADM

## 2019-07-18 RX ORDER — AMLODIPINE BESYLATE 5 MG/1
10 TABLET ORAL DAILY
Status: DISCONTINUED | OUTPATIENT
Start: 2019-07-19 | End: 2019-07-20 | Stop reason: HOSPADM

## 2019-07-18 RX ORDER — SODIUM CHLORIDE 9 MG/ML
INJECTION, SOLUTION INTRAVENOUS CONTINUOUS
Status: DISCONTINUED | OUTPATIENT
Start: 2019-07-18 | End: 2019-07-20 | Stop reason: HOSPADM

## 2019-07-18 RX ORDER — CEFAZOLIN SODIUM 2 G/50ML
2 SOLUTION INTRAVENOUS
Status: COMPLETED | OUTPATIENT
Start: 2019-07-18 | End: 2019-07-19

## 2019-07-18 RX ORDER — PROPOFOL 10 MG/ML
VIAL (ML) INTRAVENOUS
Status: DISCONTINUED | OUTPATIENT
Start: 2019-07-18 | End: 2019-07-18

## 2019-07-18 RX ORDER — ACETAMINOPHEN 500 MG
1000 TABLET ORAL
Status: COMPLETED | OUTPATIENT
Start: 2019-07-18 | End: 2019-07-18

## 2019-07-18 RX ORDER — ONDANSETRON 8 MG/1
8 TABLET, ORALLY DISINTEGRATING ORAL EVERY 8 HOURS PRN
Status: DISCONTINUED | OUTPATIENT
Start: 2019-07-18 | End: 2019-07-20 | Stop reason: HOSPADM

## 2019-07-18 RX ORDER — ONDANSETRON 2 MG/ML
INJECTION INTRAMUSCULAR; INTRAVENOUS
Status: DISCONTINUED | OUTPATIENT
Start: 2019-07-18 | End: 2019-07-18

## 2019-07-18 RX ORDER — ALBUTEROL SULFATE 0.83 MG/ML
2.5 SOLUTION RESPIRATORY (INHALATION) ONCE
Status: COMPLETED | OUTPATIENT
Start: 2019-07-18 | End: 2019-07-18

## 2019-07-18 RX ORDER — VASOPRESSIN 20 [USP'U]/ML
INJECTION, SOLUTION INTRAMUSCULAR; SUBCUTANEOUS
Status: DISCONTINUED | OUTPATIENT
Start: 2019-07-18 | End: 2019-07-18

## 2019-07-18 RX ORDER — MORPHINE SULFATE 4 MG/ML
4 INJECTION, SOLUTION INTRAMUSCULAR; INTRAVENOUS
Status: DISCONTINUED | OUTPATIENT
Start: 2019-07-18 | End: 2019-07-20 | Stop reason: HOSPADM

## 2019-07-18 RX ORDER — ERGOCALCIFEROL 1.25 MG/1
50000 CAPSULE ORAL
Status: DISCONTINUED | OUTPATIENT
Start: 2019-07-18 | End: 2019-07-18

## 2019-07-18 RX ORDER — RAMELTEON 8 MG/1
8 TABLET ORAL NIGHTLY PRN
Status: DISCONTINUED | OUTPATIENT
Start: 2019-07-18 | End: 2019-07-20 | Stop reason: HOSPADM

## 2019-07-18 RX ORDER — MIDAZOLAM HYDROCHLORIDE 1 MG/ML
1 INJECTION INTRAMUSCULAR; INTRAVENOUS
Status: DISCONTINUED | OUTPATIENT
Start: 2019-07-18 | End: 2019-07-20 | Stop reason: HOSPADM

## 2019-07-18 RX ORDER — ROCURONIUM BROMIDE 10 MG/ML
INJECTION, SOLUTION INTRAVENOUS
Status: DISCONTINUED | OUTPATIENT
Start: 2019-07-18 | End: 2019-07-18

## 2019-07-18 RX ORDER — ALBUTEROL SULFATE 0.83 MG/ML
2.5 SOLUTION RESPIRATORY (INHALATION)
Status: COMPLETED | OUTPATIENT
Start: 2019-07-18 | End: 2019-07-18

## 2019-07-18 RX ORDER — DEXTROSE MONOHYDRATE AND SODIUM CHLORIDE 5; .9 G/100ML; G/100ML
INJECTION, SOLUTION INTRAVENOUS CONTINUOUS
Status: DISCONTINUED | OUTPATIENT
Start: 2019-07-18 | End: 2019-07-18

## 2019-07-18 RX ORDER — DIPHENHYDRAMINE HYDROCHLORIDE 50 MG/ML
25 INJECTION INTRAMUSCULAR; INTRAVENOUS EVERY 6 HOURS PRN
Status: DISCONTINUED | OUTPATIENT
Start: 2019-07-18 | End: 2019-07-18 | Stop reason: HOSPADM

## 2019-07-18 RX ORDER — POLYETHYLENE GLYCOL 3350 17 G/17G
17 POWDER, FOR SOLUTION ORAL DAILY
Status: DISCONTINUED | OUTPATIENT
Start: 2019-07-18 | End: 2019-07-20 | Stop reason: HOSPADM

## 2019-07-18 RX ORDER — CEFAZOLIN SODIUM 1 G/3ML
2 INJECTION, POWDER, FOR SOLUTION INTRAMUSCULAR; INTRAVENOUS
Status: COMPLETED | OUTPATIENT
Start: 2019-07-18 | End: 2019-07-18

## 2019-07-18 RX ORDER — BUPIVACAINE HCL/EPINEPHRINE 0.25-.0005
VIAL (ML) INJECTION
Status: DISCONTINUED | OUTPATIENT
Start: 2019-07-18 | End: 2019-07-18 | Stop reason: HOSPADM

## 2019-07-18 RX ORDER — SUCCINYLCHOLINE CHLORIDE 20 MG/ML
INJECTION INTRAMUSCULAR; INTRAVENOUS
Status: DISCONTINUED | OUTPATIENT
Start: 2019-07-18 | End: 2019-07-18

## 2019-07-18 RX ORDER — IBUPROFEN 200 MG
24 TABLET ORAL
Status: DISCONTINUED | OUTPATIENT
Start: 2019-07-18 | End: 2019-07-20 | Stop reason: HOSPADM

## 2019-07-18 RX ORDER — LIDOCAINE HYDROCHLORIDE 10 MG/ML
0.5 INJECTION, SOLUTION EPIDURAL; INFILTRATION; INTRACAUDAL; PERINEURAL ONCE
Status: DISCONTINUED | OUTPATIENT
Start: 2019-07-18 | End: 2019-07-18 | Stop reason: HOSPADM

## 2019-07-18 RX ORDER — GLUCAGON 1 MG
1 KIT INJECTION
Status: DISCONTINUED | OUTPATIENT
Start: 2019-07-18 | End: 2019-07-20 | Stop reason: HOSPADM

## 2019-07-18 RX ORDER — SODIUM CHLORIDE 0.9 % (FLUSH) 0.9 %
5 SYRINGE (ML) INJECTION
Status: DISCONTINUED | OUTPATIENT
Start: 2019-07-18 | End: 2019-07-20 | Stop reason: HOSPADM

## 2019-07-18 RX ORDER — ROPIVACAINE HYDROCHLORIDE 5 MG/ML
INJECTION, SOLUTION EPIDURAL; INFILTRATION; PERINEURAL
Status: COMPLETED | OUTPATIENT
Start: 2019-07-18 | End: 2019-07-18

## 2019-07-18 RX ORDER — AMLODIPINE BESYLATE 5 MG/1
10 TABLET ORAL DAILY
Status: DISCONTINUED | OUTPATIENT
Start: 2019-07-18 | End: 2019-07-18

## 2019-07-18 RX ORDER — ALBUMIN HUMAN 50 G/1000ML
SOLUTION INTRAVENOUS CONTINUOUS PRN
Status: DISCONTINUED | OUTPATIENT
Start: 2019-07-18 | End: 2019-07-18

## 2019-07-18 RX ORDER — EPHEDRINE SULFATE 50 MG/ML
INJECTION, SOLUTION INTRAVENOUS
Status: DISCONTINUED | OUTPATIENT
Start: 2019-07-18 | End: 2019-07-18

## 2019-07-18 RX ORDER — ALBUTEROL SULFATE 0.83 MG/ML
SOLUTION RESPIRATORY (INHALATION)
Status: COMPLETED
Start: 2019-07-18 | End: 2019-07-18

## 2019-07-18 RX ORDER — ONDANSETRON 2 MG/ML
4 INJECTION INTRAMUSCULAR; INTRAVENOUS DAILY PRN
Status: DISCONTINUED | OUTPATIENT
Start: 2019-07-18 | End: 2019-07-18 | Stop reason: HOSPADM

## 2019-07-18 RX ORDER — PHENYLEPHRINE HYDROCHLORIDE 10 MG/ML
INJECTION INTRAVENOUS
Status: DISCONTINUED | OUTPATIENT
Start: 2019-07-18 | End: 2019-07-18

## 2019-07-18 RX ORDER — OXYCODONE AND ACETAMINOPHEN 10; 325 MG/1; MG/1
1 TABLET ORAL EVERY 4 HOURS PRN
Status: DISCONTINUED | OUTPATIENT
Start: 2019-07-18 | End: 2019-07-20 | Stop reason: HOSPADM

## 2019-07-18 RX ORDER — GLIPIZIDE 5 MG/1
5 TABLET, FILM COATED, EXTENDED RELEASE ORAL
Status: DISCONTINUED | OUTPATIENT
Start: 2019-07-19 | End: 2019-07-20 | Stop reason: HOSPADM

## 2019-07-18 RX ORDER — OXYCODONE AND ACETAMINOPHEN 5; 325 MG/1; MG/1
1 TABLET ORAL EVERY 4 HOURS PRN
Status: DISCONTINUED | OUTPATIENT
Start: 2019-07-18 | End: 2019-07-20 | Stop reason: HOSPADM

## 2019-07-18 RX ORDER — MIDAZOLAM HYDROCHLORIDE 1 MG/ML
INJECTION INTRAMUSCULAR; INTRAVENOUS
Status: DISCONTINUED | OUTPATIENT
Start: 2019-07-18 | End: 2019-07-18

## 2019-07-18 RX ORDER — GLIPIZIDE 5 MG/1
5 TABLET, FILM COATED, EXTENDED RELEASE ORAL
Status: DISCONTINUED | OUTPATIENT
Start: 2019-07-18 | End: 2019-07-18

## 2019-07-18 RX ORDER — TRANEXAMIC ACID 100 MG/ML
INJECTION, SOLUTION INTRAVENOUS
Status: DISCONTINUED | OUTPATIENT
Start: 2019-07-18 | End: 2019-07-18 | Stop reason: HOSPADM

## 2019-07-18 RX ORDER — HYDROMORPHONE HYDROCHLORIDE 2 MG/ML
0.4 INJECTION, SOLUTION INTRAMUSCULAR; INTRAVENOUS; SUBCUTANEOUS EVERY 5 MIN PRN
Status: DISCONTINUED | OUTPATIENT
Start: 2019-07-18 | End: 2019-07-18 | Stop reason: HOSPADM

## 2019-07-18 RX ORDER — GABAPENTIN 100 MG/1
100 CAPSULE ORAL 3 TIMES DAILY
Status: DISCONTINUED | OUTPATIENT
Start: 2019-07-18 | End: 2019-07-20 | Stop reason: HOSPADM

## 2019-07-18 RX ORDER — CYCLOBENZAPRINE HCL 10 MG
10 TABLET ORAL NIGHTLY
Status: DISCONTINUED | OUTPATIENT
Start: 2019-07-18 | End: 2019-07-20 | Stop reason: HOSPADM

## 2019-07-18 RX ORDER — GABAPENTIN 300 MG/1
600 CAPSULE ORAL 3 TIMES DAILY
Status: DISCONTINUED | OUTPATIENT
Start: 2019-07-18 | End: 2019-07-18

## 2019-07-18 RX ORDER — LISINOPRIL 20 MG/1
40 TABLET ORAL DAILY
Status: DISCONTINUED | OUTPATIENT
Start: 2019-07-18 | End: 2019-07-18

## 2019-07-18 RX ORDER — IBUPROFEN 200 MG
16 TABLET ORAL
Status: DISCONTINUED | OUTPATIENT
Start: 2019-07-18 | End: 2019-07-20 | Stop reason: HOSPADM

## 2019-07-18 RX ORDER — SODIUM CHLORIDE 9 MG/ML
INJECTION, SOLUTION INTRAVENOUS CONTINUOUS
Status: DISCONTINUED | OUTPATIENT
Start: 2019-07-18 | End: 2019-07-18

## 2019-07-18 RX ORDER — NAPROXEN SODIUM 220 MG/1
81 TABLET, FILM COATED ORAL 2 TIMES DAILY
Status: DISCONTINUED | OUTPATIENT
Start: 2019-07-18 | End: 2019-07-20 | Stop reason: HOSPADM

## 2019-07-18 RX ORDER — MUPIROCIN 20 MG/G
1 OINTMENT TOPICAL 2 TIMES DAILY
Status: DISCONTINUED | OUTPATIENT
Start: 2019-07-18 | End: 2019-07-20 | Stop reason: HOSPADM

## 2019-07-18 RX ORDER — INSULIN ASPART 100 [IU]/ML
0-5 INJECTION, SOLUTION INTRAVENOUS; SUBCUTANEOUS
Status: DISCONTINUED | OUTPATIENT
Start: 2019-07-18 | End: 2019-07-20 | Stop reason: HOSPADM

## 2019-07-18 RX ORDER — DULOXETIN HYDROCHLORIDE 30 MG/1
60 CAPSULE, DELAYED RELEASE ORAL DAILY
Status: DISCONTINUED | OUTPATIENT
Start: 2019-07-19 | End: 2019-07-20 | Stop reason: HOSPADM

## 2019-07-18 RX ORDER — PREGABALIN 75 MG/1
75 CAPSULE ORAL
Status: COMPLETED | OUTPATIENT
Start: 2019-07-18 | End: 2019-07-18

## 2019-07-18 RX ORDER — SODIUM CHLORIDE 0.9 % (FLUSH) 0.9 %
3 SYRINGE (ML) INJECTION
Status: DISCONTINUED | OUTPATIENT
Start: 2019-07-18 | End: 2019-07-20 | Stop reason: HOSPADM

## 2019-07-18 RX ORDER — LISINOPRIL 20 MG/1
40 TABLET ORAL DAILY
Status: DISCONTINUED | OUTPATIENT
Start: 2019-07-19 | End: 2019-07-20 | Stop reason: HOSPADM

## 2019-07-18 RX ORDER — MEPERIDINE HYDROCHLORIDE 25 MG/ML
12.5 INJECTION INTRAMUSCULAR; INTRAVENOUS; SUBCUTANEOUS ONCE AS NEEDED
Status: DISCONTINUED | OUTPATIENT
Start: 2019-07-18 | End: 2019-07-18 | Stop reason: HOSPADM

## 2019-07-18 RX ADMIN — OXYCODONE HYDROCHLORIDE AND ACETAMINOPHEN 1 TABLET: 10; 325 TABLET ORAL at 02:07

## 2019-07-18 RX ADMIN — PHENYLEPHRINE HYDROCHLORIDE 200 MCG: 10 INJECTION INTRAVENOUS at 07:07

## 2019-07-18 RX ADMIN — CYCLOBENZAPRINE HYDROCHLORIDE 10 MG: 10 TABLET, FILM COATED ORAL at 08:07

## 2019-07-18 RX ADMIN — SODIUM CHLORIDE, SODIUM LACTATE, POTASSIUM CHLORIDE, AND CALCIUM CHLORIDE: 600; 310; 30; 20 INJECTION, SOLUTION INTRAVENOUS at 06:07

## 2019-07-18 RX ADMIN — CEFAZOLIN 2 G: 330 INJECTION, POWDER, FOR SOLUTION INTRAMUSCULAR; INTRAVENOUS at 07:07

## 2019-07-18 RX ADMIN — VASOPRESSIN 2 UNITS: 20 INJECTION, SOLUTION INTRAMUSCULAR; SUBCUTANEOUS at 07:07

## 2019-07-18 RX ADMIN — HYDROMORPHONE HYDROCHLORIDE 0.4 MG: 2 INJECTION, SOLUTION INTRAMUSCULAR; INTRAVENOUS; SUBCUTANEOUS at 11:07

## 2019-07-18 RX ADMIN — CEFAZOLIN SODIUM 2 G: 2 SOLUTION INTRAVENOUS at 02:07

## 2019-07-18 RX ADMIN — EPHEDRINE SULFATE 10 MG: 50 INJECTION INTRAMUSCULAR; INTRAVENOUS; SUBCUTANEOUS at 06:07

## 2019-07-18 RX ADMIN — EPHEDRINE SULFATE 20 MG: 50 INJECTION INTRAMUSCULAR; INTRAVENOUS; SUBCUTANEOUS at 06:07

## 2019-07-18 RX ADMIN — OXYCODONE HYDROCHLORIDE 5 MG: 5 TABLET ORAL at 10:07

## 2019-07-18 RX ADMIN — SODIUM CHLORIDE, SODIUM LACTATE, POTASSIUM CHLORIDE, AND CALCIUM CHLORIDE 250 ML: .6; .31; .03; .02 INJECTION, SOLUTION INTRAVENOUS at 10:07

## 2019-07-18 RX ADMIN — GABAPENTIN 100 MG: 100 CAPSULE ORAL at 02:07

## 2019-07-18 RX ADMIN — ONDANSETRON 4 MG: 2 INJECTION INTRAMUSCULAR; INTRAVENOUS at 08:07

## 2019-07-18 RX ADMIN — PHENYLEPHRINE HYDROCHLORIDE 200 MCG: 10 INJECTION INTRAVENOUS at 06:07

## 2019-07-18 RX ADMIN — OXYCODONE HYDROCHLORIDE AND ACETAMINOPHEN 1 TABLET: 10; 325 TABLET ORAL at 10:07

## 2019-07-18 RX ADMIN — SODIUM CHLORIDE: 0.9 INJECTION, SOLUTION INTRAVENOUS at 01:07

## 2019-07-18 RX ADMIN — ASPIRIN 81 MG CHEWABLE TABLET 81 MG: 81 TABLET CHEWABLE at 08:07

## 2019-07-18 RX ADMIN — VASOPRESSIN 3 UNITS: 20 INJECTION, SOLUTION INTRAMUSCULAR; SUBCUTANEOUS at 09:07

## 2019-07-18 RX ADMIN — VASOPRESSIN 2 UNITS: 20 INJECTION, SOLUTION INTRAMUSCULAR; SUBCUTANEOUS at 08:07

## 2019-07-18 RX ADMIN — VASOPRESSIN 3 UNITS: 20 INJECTION, SOLUTION INTRAMUSCULAR; SUBCUTANEOUS at 08:07

## 2019-07-18 RX ADMIN — ROCURONIUM BROMIDE 10 MG: 10 INJECTION, SOLUTION INTRAVENOUS at 08:07

## 2019-07-18 RX ADMIN — ACETAMINOPHEN 1000 MG: 500 TABLET, FILM COATED ORAL at 06:07

## 2019-07-18 RX ADMIN — PREGABALIN 75 MG: 75 CAPSULE ORAL at 06:07

## 2019-07-18 RX ADMIN — SUCCINYLCHOLINE CHLORIDE 140 MG: 20 INJECTION, SOLUTION INTRAMUSCULAR; INTRAVENOUS at 07:07

## 2019-07-18 RX ADMIN — PROPOFOL 30 MG: 10 INJECTION, EMULSION INTRAVENOUS at 07:07

## 2019-07-18 RX ADMIN — ALBUMIN (HUMAN): 2.5 SOLUTION INTRAVENOUS at 08:07

## 2019-07-18 RX ADMIN — ALBUTEROL SULFATE 2.5 MG: 2.5 SOLUTION RESPIRATORY (INHALATION) at 05:07

## 2019-07-18 RX ADMIN — SODIUM CHLORIDE, SODIUM LACTATE, POTASSIUM CHLORIDE, AND CALCIUM CHLORIDE: 600; 310; 30; 20 INJECTION, SOLUTION INTRAVENOUS at 07:07

## 2019-07-18 RX ADMIN — ROPIVACAINE HYDROCHLORIDE 3 ML: 5 INJECTION, SOLUTION EPIDURAL; INFILTRATION; PERINEURAL at 06:07

## 2019-07-18 RX ADMIN — ALBUTEROL SULFATE 2.5 MG: 0.83 SOLUTION RESPIRATORY (INHALATION) at 05:07

## 2019-07-18 RX ADMIN — ALBUTEROL SULFATE 2.5 MG: 2.5 SOLUTION RESPIRATORY (INHALATION) at 10:07

## 2019-07-18 RX ADMIN — MUPIROCIN 1 G: 20 OINTMENT TOPICAL at 08:07

## 2019-07-18 RX ADMIN — CEFAZOLIN SODIUM 2 G: 2 SOLUTION INTRAVENOUS at 10:07

## 2019-07-18 RX ADMIN — GABAPENTIN 100 MG: 100 CAPSULE ORAL at 08:07

## 2019-07-18 RX ADMIN — ROCURONIUM BROMIDE 20 MG: 10 INJECTION, SOLUTION INTRAVENOUS at 07:07

## 2019-07-18 RX ADMIN — CARBOXYMETHYLCELLULOSE SODIUM 2 DROP: 2.5 SOLUTION/ DROPS OPHTHALMIC at 07:07

## 2019-07-18 RX ADMIN — POLYETHYLENE GLYCOL 3350 17 G: 17 POWDER, FOR SOLUTION ORAL at 02:07

## 2019-07-18 RX ADMIN — MIDAZOLAM HYDROCHLORIDE 2 MG: 1 INJECTION, SOLUTION INTRAMUSCULAR; INTRAVENOUS at 06:07

## 2019-07-18 RX ADMIN — FAMOTIDINE 20 MG: 20 TABLET, FILM COATED ORAL at 08:07

## 2019-07-18 RX ADMIN — ALBUMIN (HUMAN): 2.5 SOLUTION INTRAVENOUS at 07:07

## 2019-07-18 RX ADMIN — SODIUM CHLORIDE: 0.9 INJECTION, SOLUTION INTRAVENOUS at 10:07

## 2019-07-18 RX ADMIN — INSULIN ASPART 2 UNITS: 100 INJECTION, SOLUTION INTRAVENOUS; SUBCUTANEOUS at 02:07

## 2019-07-18 NOTE — PLAN OF CARE
Received patient from surgery orally intubated with 7.5 ETT secured at 21 cm philippe placed on surgery settings.Patient wasn't breathing over vent on documented settings changed to AC /20 / 5 PEEP / 55% . Pt still not getting VT advanced ETT to 22 cm philippe VT achieved. Pt wean to SIMV Dr.L Cole  aware and will manage vent.

## 2019-07-18 NOTE — PLAN OF CARE
Patient accepted by [facility]. Vilma from [facility] gave approval to pull bedside commode from our supply - [employer] to deliver to bedside      07/18/19 1045   Final Note   Assessment Type Final Discharge Note   Anticipated Discharge Disposition Home-Health   What phone number can be called within the next 1-3 days to see how you are doing after discharge? [phone]   Discharge plans and expectations educations in teach back method with documentation complete? Yes   Right Care Referral Info   Post Acute Recommendation Home-care   Facility Name [facility]

## 2019-07-18 NOTE — TRANSFER OF CARE
"Anesthesia Transfer of Care Note    Patient: Jayne You    Procedure(s) Performed: Procedure(s) (LRB):  ARTHROPLASTY, KNEE, TOTAL (Left)    Patient location: PACU    Anesthesia Type: general    Transport from OR: Transported from OR intubated on 100% O2 by AMBU with adequate controlled ventilation. Upon arrival to PACU/ICU, patient attached to ventilator and auscultated to confirm bilateral breath sounds and adequate TV    Post pain: adequate analgesia    Post assessment: no apparent anesthetic complications (High spinal, treated with vasoactive drugs.)    Post vital signs: stable    Level of consciousness: responds to stimulation    Nausea/Vomiting: no nausea/vomiting    Complications: other (see comments), High spiinal.    Transfer of care protocol was followedComments: Patient with high spinal.  Remains intubated as above.  VSS on arrival to PACU and pt. Responding appropriately to questions.  Vent [id number], TV-430, Peep-5, PS -10, O2%-55. Suctioned orally and via ETT.  Clear thick secretions returned. Camilo VILCHIS      Last vitals:   Visit Vitals  BP (!) (P) 113/58   Pulse 88   Temp 37.4 °C (99.3 °F) (Oral)   Resp 18   Ht 5' 4" (1.626 m)   Wt 117.5 kg (259 lb)   SpO2 (!) 87%   Breastfeeding? No   BMI 44.46 kg/m²     "

## 2019-07-18 NOTE — OP NOTE
DATE OF PROCEDURE: 07/18/2019     CHIEF COMPLAINT AND PRESENT ILLNESS:   79-year-old with worsening pain deformity left knee intolerable at this point exhausted non operative measures admitted for left knee replacement significant risk discussed with multiple medical problems as well as her bad back.     PREOPERATIVE DIAGNOSIS:  Osteoarthritis left knee     POSTOPERATIVE DIAGNOSIS:   same     PROCEDURES PERFORMED:   left knee replacement Biomet vanguard 65/71/10/34    SURGEON:  Isaiah Mccormick M.D.     ASSISTANT:  Raquel eVga CST.     COMPLICATIONS:   none     ANESTHESIA:  Spinal/general     BLOOD LOSS:   60     IMPLANTS:  As above     PROCEDURE IN DETAIL:   79-year-old underwent a spinal block. It carried proximally. She had some difficulty breathing.  Consequently progressed to a general.  After she was stable the left lower extremity was prepped and draped in usual sterile fashion.  Tourniquet applied 250 mm mercury after Esmarch.  Using standard medial parapatellar arthrotomy incision sharp dissection made down extensor mechanism standard medial parapatellar arthrotomy was performed medial fat pad was removed and a fairly extensive medial release performed in order to gain access to the tibia and to get her out of varus alignment shows had a little bit of a flexion contracture.  Osteophytes removed.  Anterior posterior cruciate ligaments removed medial lateral meniscal remnants removed.  The tibial cut was made per midshaft tibia sizing was 71 after all the bone spurs removed.  Attention turned to the femur with intramedullary guide 5 degree distal cut was performed.  Sizing was a 65 so with the tensor 1st in flexion anterior-posterior cuts performed with a 65 block.  Flexion gap was symmetric at 10.  Attention then turned extension gap again with the tensor a 10 mm distal cut was performed.  Flexion-extension gaps were symmetric and stable with excellent alignment.  Chamber cuts performed notch cut was  performed with a floating trial 0-120 degrees range of motion rotation was marked tibia was punched.  Prepatellar thickness 25 post patellar thickness 25 with a 34 patella the appropriate components opened using good cement technique all components were cemented excess cement was removed pulse lavage irrigation was used. Final implants were placed with antibiotic cement because of her medical history.  Excess cement was removed. Pulse lavage irrigation was used.  1.  Vicryl using the parapatellar arthrotomy closed in flexion post closure 0-120 degrees range of motion to 0 Vicryl subcutaneously staples on the skin Exparel tranexamic acid and Marcaine were instilled the soft tissues.  She was placed in bulky sterile dressing tourniquet released at 60 and brought to recovery room intubated at that point otherwise normal.    This was dictated with a poor recognition system

## 2019-07-18 NOTE — NURSING
Pt placed on bipap at this time. Pt wears bipap at home, pt o2 sats at 95% on 4l nc, pt with history of sleep apnea. Will continue to monitor

## 2019-07-18 NOTE — PLAN OF CARE
Problem: Adult Inpatient Plan of Care  Goal: Plan of Care Review  Outcome: Ongoing (interventions implemented as appropriate)  POC reviewed with patient and daughter.  Patient to the floor from OR after L total knee replacement.  Patient VSS on 2L NC.  Blood Sugars checked and insulin administered.  Patient up with PT twice after surgery.  All safety measures maintained, bed in low and locked position, call light within reach, and patient told to call for any needs or if wanted to get out of bed.  PRN pain medication given with full relief.  Will continue to monitor.

## 2019-07-18 NOTE — PT/OT/SLP PROGRESS
Occupational Therapy  Not Seen    Jayne You   MRN: [id number]     Patient not seen for Occupational Therapy today due to ( ) departmental protocol for elective surgery patients.    Patient with Primary osteoarthritis of one knee, left [M17.12], s/p Procedure(s):  ARTHROPLASTY, KNEE, TOTAL 7/18/2019 who will be seen for Occupational Therapy evaluation POD#1.    Yasmine Caraballo, OT   7/18/2019

## 2019-07-18 NOTE — PT/OT/SLP EVAL
Physical Therapy Evaluation    Patient Name:  Jayne You   MRN:  [id number]    Recommendations:     Discharge Recommendations:  home health PT, home   Discharge Equipment Recommendations: commode, shower chair   Barriers to discharge: None    Assessment:     Jayne You is a 79 y.o. female admitted with a medical diagnosis of OA (osteoarthritis) of knee.  She presents with the following impairments/functional limitations:  weakness, impaired endurance, gait instability, impaired self care skills, impaired balance, decreased coordination, decreased lower extremity function, decreased safety awareness, decreased ROM, impaired coordination, orthopedic precautions, edema .PT EVAL. Pt agreeable to PT. Preview of TKA post op there ex in supine. PT EVAL. Pt agreeable to PT. Preview of TKA post op there ex in supine.Transfer training supine> sit with min A with surgical LE and verbal cues. Sitting EOB x 5 min, AA LAQ. No s/s hypotension.  Sit>stand with CGA, w/ VC for hand placement and positioning.    Gait training 25ft with RW and CGA; Gait deviations of decreased stride length with step to gait pattern, decreased surgical knee flexion, and decreased surgical heel strike/toe off. Increased double limb support time. With verbal cues noted improvements in step length, and posture. Pt c/o of 10/10 knee pain getting worse with gait , transferred  to BSC with CGA, LE elevated, positioned for comfort. Pt presents with decrease functional mobility and safety and should benefit from PT to maximize I and safety and decrease risk of falls and injury.  Rehab Prognosis: Good; patient would benefit from acute skilled PT services to address these deficits and reach maximum level of function.    Recent Surgery: Procedure(s) (LRB):  ARTHROPLASTY, KNEE, TOTAL (Left) Day of Surgery    Plan:     During this hospitalization, patient to be seen BID to address the identified rehab impairments via gait training, therapeutic  activities, therapeutic exercises, neuromuscular re-education and progress toward the following goals:    · Plan of Care Expires:  19    Subjective     Chief Complaint: 1010 pain at end of session  Patient/Family Comments/goals:  To go home tomorrow  Pain/Comfort:  · Pain Rating 1: 3/10  · Location - Side 1: Left  · Location - Orientation 1: generalized  · Location 1: knee  · Pain Rating Post-Intervention 1: 10/10    Patients cultural, spiritual, Adventist conflicts given the current situation:      Living Environment:  Pt lives in [facility] not steps to enter, walk in shower.  Prior to admission, patients level of function was mod I with Rw.  Equipment used at home: walker, rolling.  DME owned (not currently used): standard walker and single point cane.  Upon discharge, patient will have assistance from dtr.    Objective:     Communicated with nurse prior to session.  Patient found supine with cryotherapy, peripheral IV, espinoza catheter  upon PT entry to room.    General Precautions: Standard, fall   Orthopedic Precautions:LLE weight bearing as tolerated   Braces: N/A     Exams:  Cognition:   Patient is oriented to name, , date, place, situation.  Pt follows approximately 100% of one step commands.    Mood: Pleasant and cooperative.   Musculoskeletal:  Posture: Protective guarding surgical joint  LE ROM/Strength: 5/5 bilateral hip flexion, nonsurgical knee extension, bilateral ankle dorsiflexion. AROM surgical knee difficult to accurately assess with large bulky dressing, although knee flexion grossly 0>90 degrees. Surgical knee strength grossly 5/5 knee flexion and 3-/5 knee extension.  Neuromuscular:  Sensation: Intact to light touch bilateral LEs. Pt denied paresthesias.   Coordination/Tone/Reflexes: No impairments identified with functional mobility. No formal testing performed.   Balance: CGA for dynamic standing with bilateral UE support.   Visual-vestibular: No impairments identified with functional  mobility. No formal testing performed.  Integument:  Visible skin intact and surgical extremity dressing,  approx 3 cm [place] of bright red blood soaked through proximal end of dressing, remaining of dressing CDI  Cardiopulmonary:  · BP supine: 136/77  · Sitting EOB  197/90    Functional Mobility:  · Bed Mobility:     · Supine to Sit: minimum assistance  · Transfers:     · Sit to Stand:  contact guard assistance with rolling walker  · Bed to Chair: contact guard assistance with  rolling walker  using  Step Transfer  · Gait: Gait training 25ft with RW and CGA; Gait deviations of decreased stride length with step to gait pattern, decreased surgical knee flexion, and decreased surgical heel strike/toe off. Increased double limb support time. With verbal cues noted improvements in step length, and posture. Pt c/o of 10/10 knee pain getting worse with gait      Therapeutic Activities and Exercises:   PT EVAL. Pt agreeable to PT. Preview of TKA post op there ex in supine.Trnasfer training supine> sit with min A with surgical LE and verbal cues. Sitting EOB x 5 min, AA LAQ. No s/s hypotension.  Sit>stand with CGA, w/ VC for hand placement and positioning.    Gait training 25ft with RW and CGA; Gait deviations of decreased stride length with step to gait pattern, decreased surgical knee flexion, and decreased surgical heel strike/toe off. Increased double limb support time. With verbal cues noted improvements in step length, and posture. Pt c/o of 10/10 knee pain getting worse with gait , transferred  to BSC with CGA, LE elevated, positioned for comfort. Pt presents with decrease functional mobility and safety and should benefit from PT to maximize I and safety and decrease risk of falls and injury.  AM-PAC 6 CLICK MOBILITY  Total Score:17     Patient left up in chair with all lines intact, call button in reach, nurse notified and dtr present.    GOALS:   Multidisciplinary Problems     Physical Therapy Goals        Problem:  Physical Therapy Goal    Goal Priority Disciplines Outcome Goal Variances Interventions   Physical Therapy Goal     PT, PT/OT      Description:  Goals to be met by: 2019    Patient will increase functional independence with mobility by performin. Sit<>stand with SBA with RW.  2. Gait x 150ft with RW feet with RW with SBA.  3. Ascend/descend 1 6 in platform step(s) with least restrictive assistive device and RW.                     History:     Past Medical History:   Diagnosis Date    Cardiomyopathy     Depression     Diabetes mellitus     Hypertension     Lipids abnormal     Lumbar herniated disc     Obesity     RBBB     Sleep apnea        Past Surgical History:   Procedure Laterality Date    APPENDECTOMY      BACK SURGERY  ,,2017    BREAST SURGERY Bilateral     reduction    EYE SURGERY Bilateral     JOINT REPLACEMENT Right 2006    hip    TOTAL HIP ARTHROPLASTY Right        Time Tracking:     PT Received On: 19  PT Start Time: 1356     PT Stop Time: 1432  PT Total Time (min): 36 min     Billable Minutes: Evaluation 15, Gait Training 12 and Therapeutic Activity 9      Hira Shanks, PT  2019

## 2019-07-18 NOTE — OR NURSING
Dr Cole at bedside, extubated. Pt awake and alert, states she feels better now. resp rate 24, BBS clear. Denies shortness of breath.

## 2019-07-18 NOTE — PT/OT/SLP PROGRESS
Physical Therapy Treatment    Patient Name:  Jayne You   MRN:  [id number]    Recommendations:     Discharge Recommendations:  home health PT   Discharge Equipment Recommendations: commode, shower chair   Barriers to discharge: None (pt will have assistance from daughter)    Assessment:     Jayne You is a 79 y.o. female admitted with a medical diagnosis of OA (osteoarthritis) of knee.  She presents with the following impairments/functional limitations:  weakness, impaired endurance, impaired self care skills, impaired functional mobilty, gait instability, impaired balance, decreased lower extremity function, pain, decreased ROM, edema, impaired skin, decreased safety awareness, orthopedic precautions ;pt with fair mobility this PM, inc. SOB noted, O2:87% on RA, HR:115 with amb. Also, RLE gave out when standing up (not operated leg).    Rehab Prognosis: Good; patient would benefit from acute skilled PT services to address these deficits and reach maximum level of function.    Recent Surgery: Procedure(s) (LRB):  ARTHROPLASTY, KNEE, TOTAL (Left) Day of Surgery    Plan:     During this hospitalization, patient to be seen BID to address the identified rehab impairments via gait training, therapeutic activities, therapeutic exercises, neuromuscular re-education and progress toward the following goals:    · Plan of Care Expires:  07/25/19    Subjective     Chief Complaint: pain  Patient/Family Comments/goals: pt agreeable to session  Pain/Comfort:  · Pain Rating 1: 2/10(at rest)  · Location - Side 1: Left  · Location - Orientation 1: generalized  · Location 1: knee  · Pain Addressed 1: Pre-medicate for activity, Reposition, Distraction  · Pain Rating Post-Intervention 1: 7/10(with amb)      Objective:     Communicated with nurse prior to session.  Patient found up in chair with peripheral IV, espinoza catheter(coldpack) upon PT entry to room.     General Precautions: Standard, fall   Orthopedic  Precautions:LLE weight bearing as tolerated   Braces: N/A     Functional Mobility:  · Bed Mobility:     · Sit to Supine: moderate assistance and at LE's  · Transfers:     · Sit to Stand:  minimum assistance with rolling walker  · Gait: amb'd 40' and 30' with RW and CGA/min.A, WBAT on LLE      AM-PAC 6 CLICK MOBILITY  Turning over in bed (including adjusting bedclothes, sheets and blankets)?: 3  Sitting down on and standing up from a chair with arms (e.g., wheelchair, bedside commode, etc.): 3  Moving from lying on back to sitting on the side of the bed?: 3  Moving to and from a bed to a chair (including a wheelchair)?: 3  Need to walk in hospital room?: 3  Climbing 3-5 steps with a railing?: 2  Basic Mobility Total Score: 17       Therapeutic Activities and Exercises:  AP's perf'd.  deferred other ex's 2/2 active bleeding noted at top of incision, nsg. Notified. Pt's daughter requesting respirtory to come set up CPAP machine now, not later tonight as indicated. Nsg notified as well.    Patient left HOB elevated with all lines intact, call button in reach, nurse notified and daughter present..    GOALS:   Multidisciplinary Problems     Physical Therapy Goals        Problem: Physical Therapy Goal    Goal Priority Disciplines Outcome Goal Variances Interventions   Physical Therapy Goal     PT, PT/OT      Description:  Goals to be met by: 2019    Patient will increase functional independence with mobility by performin. Sit<>stand with SBA with RW.  2. Gait x 150ft with RW feet with RW with SBA.  3. Ascend/descend 1 6 in platform step(s) with least restrictive assistive device and RW.                     Time Tracking:     PT Received On: 19  PT Start Time: 1550     PT Stop Time: 1630  PT Total Time (min): 40 min     Billable Minutes: Gait Training 25 and Therapeutic Activity 15    Treatment Type: Treatment  PT/PTA: PTA     PTA Visit Number: 0     Justa Rowell, PTA  2019

## 2019-07-18 NOTE — PLAN OF CARE
Pt wanted daughter to answer discharge assessment questions, verified PCP and pt uses [facility] on [address].  Pt has a CPAP, RW, rollator, SC and quad cane, needs BSC.  Pt would like to remain with [facility].  Daughter will provide transportation home.     07/18/19 7109   Discharge Assessment   Assessment Type Discharge Planning Assessment   Confirmed/corrected address and phone number on facesheet? Yes   Assessment information obtained from? Patient   Communicated expected length of stay with patient/caregiver no   Prior to hospitilization cognitive status: Alert/Oriented   Prior to hospitalization functional status: Independent   Current cognitive status: Alert/Oriented   Current Functional Status: Assistive Equipment;Needs Assistance   Lives With alone   Able to Return to Prior Arrangements yes   Is patient able to care for self after discharge? Unable to determine at this time (comments)   Who are your caregiver(s) and their phone number(s)?   (Thuan, daughter)   Readmission Within the Last 30 Days no previous admission in last 30 days   Patient currently being followed by outpatient case management? No   Patient currently receives any other outside agency services? Yes   Name and contact number of agency or person providing outside services   ([facility])   Equipment Currently Used at Home CPAP;walker, rolling;rollator;cane, quad;shower chair   Do you have any problems affording any of your prescribed medications? No   Is the patient taking medications as prescribed? yes   Does the patient have transportation home? Yes   Transportation Anticipated family or friend will provide   Does the patient receive services at the [facility]? No   Discharge Plan A Home Health   DME Needed Upon Discharge  bedside commode   Patient/Family in Agreement with Plan yes

## 2019-07-18 NOTE — ANESTHESIA PROCEDURE NOTES
Spinal    Diagnosis: OA L KNEE  Patient location during procedure: holding area  Start time: 7/18/2019 6:41 AM  Timeout: 7/18/2019 6:41 AM  End time: 7/18/2019 6:49 AM    Staffing  Authorizing Provider: Valerio Michelle MD  Performing Provider: Valerio Michelle MD    Preanesthetic Checklist  Completed: patient identified, site marked, surgical consent, pre-op evaluation, timeout performed, IV checked, risks and benefits discussed and monitors and equipment checked  Spinal Block  Patient position: sitting  Prep: ChloraPrep  Patient monitoring: heart rate, cardiac monitor, continuous pulse ox and frequent blood pressure checks  Approach: right paramedian  Location: L3-4  Injection technique: single shot  CSF Fluid: clear free-flowing CSF  Needle  Needle type: Sebastien   Needle gauge: 22 G  Needle length: 3.5 in  Additional Documentation: incremental injection, negative aspiration for heme and no paresthesia on injection  Needle localization: anatomical landmarks  Assessment  Sensory level: T6   Dermatomal levels determined by alcohol wipe and pinch or prick  Ease of block: easy  Patient's tolerance of the procedure: no complaints

## 2019-07-18 NOTE — CONSULTS
Consult Note  Nephrology    Consult Requested By: Isaiah Mccormick MD  Reason for Consult: DM/Obesity/HTN/KHAI/Cardiomyopathy/Hypotensive    SUBJECTIVE:     History of Present Illness:  Patient is a 79 y.o. female presents with scheduled left knee repair.  Seen post op in PACU.  Events of spinal anesthesia noted and had to be intubated.  Hypotensive intraop.  Just extubated in PACU by anesthesia.  Pre-op/EPIC reviewed.  Currently no CP/SOB/N/V/D/F/C.  Left knee pain and sore throat from ETT.  Discussed with RN and anesthesia at bedside.      Past Medical History:   Diagnosis Date    Cardiomyopathy     Depression     Diabetes mellitus     Hypertension     Lipids abnormal     Lumbar herniated disc     Obesity     RBBB     Sleep apnea      Past Surgical History:   Procedure Laterality Date    APPENDECTOMY      BACK SURGERY  2014,2016,2017    BREAST SURGERY Bilateral     reduction    EYE SURGERY Bilateral     JOINT REPLACEMENT Right 2006    hip    TOTAL HIP ARTHROPLASTY Right 2006     History reviewed. No pertinent family history.  Social History     Tobacco Use    Smoking status: Never Smoker    Smokeless tobacco: Never Used   Substance Use Topics    Alcohol use: No    Drug use: Not on file       Review of patient's allergies indicates:   Allergen Reactions    Codeine Nausea And Vomiting     Pt reports can take percocet        Review of Systems:  Constitutional: No fever or chills  Respiratory: No cough or shortness of breath  Cardiovascular: No chest pain or palpitations  Gastrointestinal: No nausea or vomiting  Neurological: No confusion or weakness    OBJECTIVE:     Vital Signs (Most Recent)  Temp: 99.3 °F (37.4 °C) (07/18/19 0603)  Pulse: 95 (07/18/19 1030)  Resp: 18 (07/18/19 0603)  BP: (!) 121/58 (07/18/19 1030)  SpO2: 100 % (07/18/19 1030)    Vital Signs Range (Last 24H):  Temp:  [99.3 °F (37.4 °C)]   Pulse:  [84-98]   Resp:  [18]   BP: ()/(47-65)   SpO2:  [87 %-100 %]        Intake/Output Summary (Last 24 hours) at 7/18/2019 1036  Last data filed at 7/18/2019 1000  Gross per 24 hour   Intake 2750 ml   Output 75 ml   Net 2675 ml       Physical Exam:  General appearance: Well developed, well nourished, morbidly obese.  Just extubated.   Eyes:  Conjunctivae/corneas clear. PERRL.  Lungs: Normal respiratory effort,   clear to auscultation bilaterally, oral secretions.   Heart: Regular rate and rhythm, S1, S2 normal, no murmur, rub or anna.  Abdomen: Soft, non-tender non-distended; bowel sounds normal; no masses,  no organomegaly, morbid obesity.   Extremities: No cyanosis or clubbing. 1+edema.    Skin: Skin color, texture, turgor normal. No rashes or lesions  Neurologic: Normal strength and tone. No focal numbness or weakness   Left knee CDI  Proctor      Laboratory:    Reviewed     Diagnostic Results:  X-Ray Knee 1 or 2 View Left   Final Result      Postoperative changes related to recent left knee arthroplasty with the metallic prosthesis appearing in satisfactory position.         Electronically signed by: Easton Benton MD   Date:    07/18/2019   Time:    09:19          ASSESSMENT/PLAN:     1. S/P Left Knee (M17.10):  Per ortho/therapy teams.   2. DM (E11.65):  Hold glipizide until tolerating diet.  ASA/SSI  3. HTN but intraop hypotension noted (I10/ I95.9):  Hold BP meds until tomorrow with hold parameters.  LR/albumin given intraop.  Small bolus now and continue saline infusion. Monitor renal fxn for suspected HIGINIO with hypotension.    4. KHAI with resp insuff (G47.33):  CPAP ordered QHS. Nebs now.    5. Morbid obesity (E66):  Needs aggressive weight mgmt.  Defer.    6. Neuropathy (G62.9):  Lower dose of Neurontin.    7. DVT prophy:  ASA BID.     PCP:  [facility]PH[facility]      Thanks for consult  See above  Will follow along.

## 2019-07-18 NOTE — PLAN OF CARE
Problem: Physical Therapy Goal  Goal: Physical Therapy Goal  Goals to be met by: 2019    Patient will increase functional independence with mobility by performin. Sit<>stand with SBA with RW.  2. Gait x 150ft with RW feet with RW with SBA.  3. Ascend/descend 1 6 in platform step(s) with least restrictive assistive device and RW.   PT EVAL. Pt agreeable to PT. Preview of TKA post op there ex in supine.Trnasfer training supine> sit with min A with surgical LE and verbal cues. Sitting EOB x 5 min, AA LAQ. No s/s hypotension.  Sit>stand with CGA, w/ VC for hand placement and positioning.    Gait training 25ft with RW and CGA; Gait deviations of decreased stride length with step to gait pattern, decreased surgical knee flexion, and decreased surgical heel strike/toe off. Increased double limb support time. With verbal cues noted improvements in step length, and posture. Pt c/o of 10/10 knee pain getting worse with gait , transferred  to BSC with CGA, LE elevated, positioned for comfort. Pt presents with decrease functional mobility and safety and should benefit from PT to maximize I and safety and decrease risk of falls and injury.

## 2019-07-18 NOTE — ANESTHESIA POSTPROCEDURE EVALUATION
Anesthesia Post Evaluation    Patient: Jayne You    Procedure(s) Performed: Procedure(s) (LRB):  ARTHROPLASTY, KNEE, TOTAL (Left)    Final Anesthesia Type: general  Patient location during evaluation: PACU  Patient participation: Yes- Able to Participate  Level of consciousness: awake and alert  Post-procedure vital signs: reviewed and stable  Pain management: adequate  Airway patency: patent  PONV status at discharge: No PONV  Anesthetic complications: yes  Perioperative Events: hypotension requiring unaticipated pressor infusion, prolonged PACU stay - patient condition and other periop events (see comments)  Trinh-operative Events Comments: Pt had uneventful SAB placement with isobaric Ropivicaine. Initially very hypotensive, approx 20 minutes after injection, onset of respiratory difficulties. Dx with total spinal, intubated, case proceeded cx by need for BP support into PACU period. Ultimately weaned off pressors and vent, extubated without difficulty. After prolonged PACU stay, d/c to floor, no further problems.  Cardiovascular status: blood pressure returned to baseline and stable  Respiratory status: unassisted, spontaneous ventilation and nasal cannula  Hydration status: euvolemic  Follow-up not needed.          Vitals Value Taken Time   /64 7/18/2019 12:30 PM   Temp 36.6 °C (97.8 °F) 7/18/2019 12:10 PM   Pulse 99 7/18/2019 12:31 PM   Resp 24 7/18/2019 12:30 PM   SpO2 100 % 7/18/2019 12:30 PM   Vitals shown include unvalidated device data.      Event Time     Out of Recovery 12:42:20          Pain/Rahul Score: Pain Rating Prior to Med Admin: 8 (7/18/2019 11:55 AM)  Pain Rating Post Med Admin: 0 (7/18/2019 12:11 PM)  Rahul Score: 9 (7/18/2019 12:11 PM)

## 2019-07-19 PROBLEM — M17.9 OA (OSTEOARTHRITIS) OF KNEE: Status: RESOLVED | Noted: 2019-07-18 | Resolved: 2019-07-19

## 2019-07-19 LAB
ANION GAP SERPL CALC-SCNC: 9 MMOL/L (ref 8–16)
BASOPHILS # BLD AUTO: 0.02 K/UL (ref 0–0.2)
BASOPHILS NFR BLD: 0.2 % (ref 0–1.9)
BUN SERPL-MCNC: 11 MG/DL (ref 8–23)
CALCIUM SERPL-MCNC: 8.6 MG/DL (ref 8.7–10.5)
CHLORIDE SERPL-SCNC: 103 MMOL/L (ref 95–110)
CO2 SERPL-SCNC: 28 MMOL/L (ref 23–29)
CREAT SERPL-MCNC: 0.8 MG/DL (ref 0.5–1.4)
DIFFERENTIAL METHOD: ABNORMAL
EOSINOPHIL # BLD AUTO: 0.1 K/UL (ref 0–0.5)
EOSINOPHIL NFR BLD: 1.3 % (ref 0–8)
ERYTHROCYTE [DISTWIDTH] IN BLOOD BY AUTOMATED COUNT: 14.7 % (ref 11.5–14.5)
EST. GFR  (AFRICAN AMERICAN): >60 ML/MIN/1.73 M^2
EST. GFR  (NON AFRICAN AMERICAN): >60 ML/MIN/1.73 M^2
GLUCOSE SERPL-MCNC: 197 MG/DL (ref 70–110)
HCT VFR BLD AUTO: 37.3 % (ref 37–48.5)
HGB BLD-MCNC: 11.1 G/DL (ref 12–16)
IMM GRANULOCYTES # BLD AUTO: 0.03 K/UL (ref 0–0.04)
IMM GRANULOCYTES NFR BLD AUTO: 0.3 % (ref 0–0.5)
LYMPHOCYTES # BLD AUTO: 1.3 K/UL (ref 1–4.8)
LYMPHOCYTES NFR BLD: 14.9 % (ref 18–48)
MCH RBC QN AUTO: 25.4 PG (ref 27–31)
MCHC RBC AUTO-ENTMCNC: 29.8 G/DL (ref 32–36)
MCV RBC AUTO: 85 FL (ref 82–98)
MONOCYTES # BLD AUTO: 1.1 K/UL (ref 0.3–1)
MONOCYTES NFR BLD: 12.9 % (ref 4–15)
NEUTROPHILS # BLD AUTO: 6.2 K/UL (ref 1.8–7.7)
NEUTROPHILS NFR BLD: 70.4 % (ref 38–73)
NRBC BLD-RTO: 0 /100 WBC
PLATELET # BLD AUTO: 145 K/UL (ref 150–350)
PMV BLD AUTO: 11.8 FL (ref 9.2–12.9)
POCT GLUCOSE: 189 MG/DL (ref 70–110)
POCT GLUCOSE: 206 MG/DL (ref 70–110)
POTASSIUM SERPL-SCNC: 4.3 MMOL/L (ref 3.5–5.1)
RBC # BLD AUTO: 4.37 M/UL (ref 4–5.4)
SODIUM SERPL-SCNC: 140 MMOL/L (ref 136–145)
WBC # BLD AUTO: 8.78 K/UL (ref 3.9–12.7)

## 2019-07-19 PROCEDURE — 99900035 HC TECH TIME PER 15 MIN (STAT)

## 2019-07-19 PROCEDURE — 36415 COLL VENOUS BLD VENIPUNCTURE: CPT

## 2019-07-19 PROCEDURE — 97535 SELF CARE MNGMENT TRAINING: CPT

## 2019-07-19 PROCEDURE — 27000221 HC OXYGEN, UP TO 24 HOURS

## 2019-07-19 PROCEDURE — 80048 BASIC METABOLIC PNL TOTAL CA: CPT

## 2019-07-19 PROCEDURE — 94660 CPAP INITIATION&MGMT: CPT

## 2019-07-19 PROCEDURE — 97116 GAIT TRAINING THERAPY: CPT | Mod: 59

## 2019-07-19 PROCEDURE — 97110 THERAPEUTIC EXERCISES: CPT

## 2019-07-19 PROCEDURE — 85025 COMPLETE CBC W/AUTO DIFF WBC: CPT

## 2019-07-19 PROCEDURE — 97165 OT EVAL LOW COMPLEX 30 MIN: CPT

## 2019-07-19 PROCEDURE — 94799 UNLISTED PULMONARY SVC/PX: CPT

## 2019-07-19 PROCEDURE — 94761 N-INVAS EAR/PLS OXIMETRY MLT: CPT

## 2019-07-19 PROCEDURE — 25000003 PHARM REV CODE 250: Performed by: NURSE PRACTITIONER

## 2019-07-19 PROCEDURE — 25000003 PHARM REV CODE 250: Performed by: ORTHOPAEDIC SURGERY

## 2019-07-19 PROCEDURE — 97530 THERAPEUTIC ACTIVITIES: CPT

## 2019-07-19 RX ORDER — OXYCODONE AND ACETAMINOPHEN 5; 325 MG/1; MG/1
1 TABLET ORAL EVERY 6 HOURS PRN
Qty: 50 TABLET | Refills: 0 | Status: SHIPPED | OUTPATIENT
Start: 2019-07-19 | End: 2020-10-27 | Stop reason: CLARIF

## 2019-07-19 RX ORDER — NAPROXEN SODIUM 220 MG/1
81 TABLET, FILM COATED ORAL 2 TIMES DAILY
Refills: 0 | Status: ON HOLD | COMMUNITY
Start: 2019-07-19 | End: 2021-06-16

## 2019-07-19 RX ADMIN — MUPIROCIN 1 G: 20 OINTMENT TOPICAL at 08:07

## 2019-07-19 RX ADMIN — SODIUM CHLORIDE: 0.9 INJECTION, SOLUTION INTRAVENOUS at 02:07

## 2019-07-19 RX ADMIN — POLYETHYLENE GLYCOL 3350 17 G: 17 POWDER, FOR SOLUTION ORAL at 08:07

## 2019-07-19 RX ADMIN — GABAPENTIN 100 MG: 100 CAPSULE ORAL at 09:07

## 2019-07-19 RX ADMIN — INSULIN ASPART 3 UNITS: 100 INJECTION, SOLUTION INTRAVENOUS; SUBCUTANEOUS at 05:07

## 2019-07-19 RX ADMIN — GABAPENTIN 100 MG: 100 CAPSULE ORAL at 08:07

## 2019-07-19 RX ADMIN — ASPIRIN 81 MG CHEWABLE TABLET 81 MG: 81 TABLET CHEWABLE at 08:07

## 2019-07-19 RX ADMIN — CYCLOBENZAPRINE HYDROCHLORIDE 10 MG: 10 TABLET, FILM COATED ORAL at 09:07

## 2019-07-19 RX ADMIN — ASPIRIN 81 MG CHEWABLE TABLET 81 MG: 81 TABLET CHEWABLE at 09:07

## 2019-07-19 RX ADMIN — INSULIN ASPART 2 UNITS: 100 INJECTION, SOLUTION INTRAVENOUS; SUBCUTANEOUS at 12:07

## 2019-07-19 RX ADMIN — FAMOTIDINE 20 MG: 20 TABLET, FILM COATED ORAL at 08:07

## 2019-07-19 RX ADMIN — FAMOTIDINE 20 MG: 20 TABLET, FILM COATED ORAL at 09:07

## 2019-07-19 RX ADMIN — MUPIROCIN 1 G: 20 OINTMENT TOPICAL at 09:07

## 2019-07-19 RX ADMIN — OXYCODONE HYDROCHLORIDE AND ACETAMINOPHEN 1 TABLET: 10; 325 TABLET ORAL at 09:07

## 2019-07-19 RX ADMIN — LISINOPRIL 40 MG: 20 TABLET ORAL at 08:07

## 2019-07-19 RX ADMIN — OXYCODONE HYDROCHLORIDE AND ACETAMINOPHEN 1 TABLET: 10; 325 TABLET ORAL at 04:07

## 2019-07-19 RX ADMIN — DULOXETINE 60 MG: 30 CAPSULE, DELAYED RELEASE ORAL at 08:07

## 2019-07-19 RX ADMIN — AMLODIPINE BESYLATE 10 MG: 5 TABLET ORAL at 08:07

## 2019-07-19 RX ADMIN — GLIPIZIDE 5 MG: 5 TABLET, FILM COATED, EXTENDED RELEASE ORAL at 08:07

## 2019-07-19 RX ADMIN — GABAPENTIN 100 MG: 100 CAPSULE ORAL at 04:07

## 2019-07-19 NOTE — NURSING
Surgical dressing changed to left knee. Active bleeding noted, 5 min of pressure applied. Bleeding controlled. 4x4 abd pad applied and secured with Ace wrap. Incision well approximated with sutures intact. Patient tolerated well.

## 2019-07-19 NOTE — PROGRESS NOTES
Postop day 1.  Left knee replacement.  A little slow with her ambulation because of overall weakness and medical condition.  Plan is to discharge tomorrow home with home health.  Instructions given.

## 2019-07-19 NOTE — PT/OT/SLP EVAL
Occupational Therapy   Evaluation and Treatment    Name: Jayne You  MRN: [id number]  Admitting Diagnosis:  OA (osteoarthritis) of knee 1 Day Post-Op    Recommendations:     Discharge Recommendations: (TBD, patient with increased pain and low endurance limiting mobility in session)  Discharge Equipment Recommendations:  other (see comments), shower chair(3-in-1 commode)  Barriers to discharge:  Decreased caregiver support    Assessment:     Jayne You is a 79 y.o. female with a medical diagnosis of OA (osteoarthritis) of knee.  She presents with the following performance deficits affecting function: weakness, impaired endurance, impaired self care skills, impaired functional mobilty, gait instability, impaired balance, decreased lower extremity function, decreased ROM, edema, impaired skin, orthopedic precautions, pain, decreased safety awareness, impaired cardiopulmonary response to activity.      Initial OT evaluation completed, with treatment to follow.  Patient with impaired endurance and pain, unable to tolerate functional mobility and standing during session.  Patient will require 24/7 care in the home for safety at this time.  Continue OT services to maximize patient functioning.    Rehab Prognosis: Fair; patient would benefit from acute skilled OT services to address these deficits and reach maximum level of function.       Plan:     Patient to be seen daily to address the above listed problems via self-care/home management, therapeutic activities, therapeutic exercises  · Plan of Care Expires: 08/16/19  · Plan of Care Reviewed with: patient    Subjective     Chief Complaint: Pain in weight bearing  Patient/Family Comments/goals: None stated    Occupational Profile:  Living Environment: Patient resides in a one story home with no steps to enter.  There is a walk in shower and standard height toilet.  Previous level of function: Reports modified independence with RW  Roles and Routines:  Limited by pain  Equipment Used at Home:  walker, rolling(owns, not using, SPC)  Assistance upon Discharge: Mally     Pain/Comfort:  · Pain Rating 1: 3/10  · Location - Side 1: Left  · Location - Orientation 1: generalized  · Location 1: knee  · Pain Addressed 1: Reposition, Distraction, Nurse notified(Cher, ortho transitional RN, also notified re. bleeding proximal porton of knee)  · Pain Rating Post-Intervention 1: 10/10    Patients cultural, spiritual, Buddhism conflicts given the current situation: no    Objective:     Communicated with: RN prior to session.  Patient found up in chair with peripheral IV, cryotherapy upon OT entry to room.    General Precautions: Standard, diabetic, fall   Orthopedic Precautions:LLE weight bearing as tolerated   Braces: N/A     Occupational Performance:    Bed Mobility:    · NT, found [facility]    Functional Mobility/Transfers:  · Patient completed Sit <> Stand Transfer with minimum assistance  with  rolling walker   · Patient completed Toilet Transfer Stand Pivot and Step Transfer technique with minimum assistance with  rolling walker  · Functional Mobility: NT, unable to ambulate greater than approx 4 feet    Activities of Daily Living:  · Grooming: stand by assistance seated at sink  · Upper Body Dressing: stand by assistance seated  · Lower Body Dressing: maximal assistance seated and in standing  · Toileting: moderate assistance for clothing management    Cognitive/Visual Perceptual:  Cognitive/Psychosocial Skills:     -       Oriented to: Person, Place and Situation   -       Follows Commands/attention:Follows two-step commands  -       Communication: clear/fluent  -       Memory: No Deficits noted  -       Safety awareness/insight to disability: needed cues for safety as pain and fatigue increased   -       Mood/Affect/Coping skills/emotional control: Appropriate to situation and Cooperative  Visual/Perceptual:      -Intact     Physical Exam:  Postural examination/scapula  alignment:    -       Rounded shoulders  -       Forward head  -       Abnormal trunk flexion  Skin integrity: Visible skin intact  Edema:  Moderate (L) LE  Sensation:    -       Intact  light/touch (B) UEs  Dominant hand:    -       (R)  Upper Extremity Range of Motion:     -       Right Upper Extremity: WFL  -       Left Upper Extremity: WFL  Upper Extremity Strength:    -       Right Upper Extremity: WFL  -       Left Upper Extremity: WFL   Strength:    -       Right Upper Extremity: WFL  -       Left Upper Extremity: WFL  Gross motor coordination:   WFL    AMPAC 6 Click ADL:  AMPAC Total Score: 16    Treatment & Education:  Educated to role of OT, DME recs, assisted with all transfer, basic ADL tasks.  Will reinforce.  Education:    Patient left up in chair with all lines intact, call button in reach, RN notified and friend present    GOALS:   Multidisciplinary Problems     Occupational Therapy Goals        Problem: Occupational Therapy Goal    Goal Priority Disciplines Outcome Interventions   Occupational Therapy Goal     OT, PT/OT Ongoing (interventions implemented as appropriate)    Description:  Goals to be met by: 07/24/19     Patient will increase functional independence with ADLs by performing:    LE Dressing with Moderate Assistance.  Toileting from bedside commode with Minimal Assistance for hygiene and clothing management.   Supine to sit with Minimal Assistance.  Stand pivot transfers with Contact Guard Assistance.  Step transfer with Contact Guard Assistance  Toilet transfer to bedside commode with Contact Guard Assistance.                      History:     Past Medical History:   Diagnosis Date    Cardiomyopathy     Depression     Diabetes mellitus     Hypertension     Lipids abnormal     Lumbar herniated disc     Obesity     RBBB     Sleep apnea        Past Surgical History:   Procedure Laterality Date    APPENDECTOMY      ARTHROPLASTY, KNEE, TOTAL Left 7/18/2019    Performed by  Isaiah [facility]ccormick [facility] at [facility]    BACK SURGERY  2014,2016,2017    BREAST SURGERY Bilateral     reduction    EYE SURGERY Bilateral     JOINT REPLACEMENT Right 2006    hip    TOTAL HIP ARTHROPLASTY Right 2006       Time Tracking:     OT Date of Treatment: 07/19/19  OT Start Time: 0858  OT Stop Time: 0934  OT Total Time (min): 36 min    Billable Minutes:Evaluation 20  Self Care/Home Management 16    NELLA Britton  7/19/2019

## 2019-07-19 NOTE — DISCHARGE SUMMARY
[facility]  Discharge Summary      Admit Date: 7/18/2019    Discharge Date and Time: No discharge date for patient encounter.    Attending Physician: Isaiah Mccormick MD     Reason for Admission:  Osteoarthritis left knee    Procedures Performed: Procedure(s) (LRB):  ARTHROPLASTY, KNEE, TOTAL (Left)    Hospital Course (synopsis of major diagnoses, care, treatment, and services provided during the course of the hospital stay):  Overall did well. She had a high spinal with some difficulty breathing.  Slow down some of her postoperative course.  Also she is somewhat decompensated with her overall medical status.  Presently functioning fairly well.  Plan discharge home with home health.      Consults: nephrology    Significant Diagnostic Studies: Labs:   CBC   Recent Labs   Lab 07/19/19  0524   WBC 8.78   HGB 11.1*   HCT 37.3   *       Final Diagnoses:    Principal Problem: OA (osteoarthritis) of knee   Secondary Diagnoses:   Active Hospital Problems   No active problems to display.      Resolved Hospital Problems    Diagnosis Date Resolved POA    *OA (osteoarthritis) of knee [M17.10] 07/19/2019 Yes    OA (osteoarthritis) of knee [M17.10] 07/18/2019 Yes       Discharged Condition: good    [place] [facility]    [place]/Patient Instructions:     Medications:  Reconciled Home Medications:      Medication List      START taking these medications    aspirin 81 MG Chew  Take 1 tablet (81 mg total) by mouth 2 (two) times daily.     oxyCODONE-acetaminophen 5-325 mg per tablet  Commonly known as:  PERCOCET  Take 1 tablet by mouth every 6 (six) hours as needed.        CONTINUE taking these medications    amLODIPine 5 MG tablet  Commonly known as:  NORVASC  Take 10 mg by mouth once daily.     buprenorphine HCl 150 mcg Film  Commonly known as:  BELBUCA  Place inside cheek 2 (two) times daily as needed.     cyclobenzaprine 10 MG tablet  Commonly known as:  FLEXERIL  Take 10 mg by mouth  "every evening.     ergocalciferol 50,000 unit Cap  Commonly known as:  ERGOCALCIFEROL  Take 50,000 Units by mouth every 7 days. Mondays     furosemide 20 MG tablet  Commonly known as:  LASIX  Take 20 mg by mouth as needed. Takes when feet swell     gabapentin 600 MG tablet  Commonly known as:  NEURONTIN  Take 600 mg by mouth 3 (three) times daily.     glipiZIDE 5 MG Tr24  Commonly known as:  GLUCOTROL  Take 5 mg by mouth daily with breakfast.     lisinopril 40 MG tablet  Commonly known as:  PRINIVIL,ZESTRIL  Take 40 mg by mouth once daily.     meloxicam 7.5 MG tablet  Commonly known as:  MOBIC  Take 7.5 mg by mouth 2 (two) times daily.     ondansetron 4 MG tablet  Commonly known as:  ZOFRAN  Take 1 tablet (4 mg total) by mouth every 8 (eight) hours as needed.          Discharge Procedure Orders   COMMODE FOR HOME USE     Order Specific Question Answer Comments   Type: Standard    Height: 5' 4" (1.626 m)    Weight: 117.5 kg (259 lb)    Length of need (1-99 months): 99      Follow-up Information     [facility].    Specialties:  Home Health Services, Physical Therapy, Occupational Therapy  Why:  Home Health   Contact information:  [address] [address]  [phone]             Follow up On 8/1/2019.    Why:  AS SCHEDULED , Call [phone] to reach Dr. Mccormick               "

## 2019-07-19 NOTE — NURSING
Proctor discontinued at this time, pt tolerated well, pt encouraged to call for assist as needed prior to ambulation and/or the need to use the restroom, will continue to monitor.

## 2019-07-19 NOTE — PLAN OF CARE
Problem: Adult Inpatient Plan of Care  Goal: Plan of Care Review  Outcome: Ongoing (interventions implemented as appropriate)  Pt remains on RA with bipap at bedside for QHS. No resp distress noted.

## 2019-07-19 NOTE — PLAN OF CARE
Problem: Physical Therapy Goal  Goal: Physical Therapy Goal  Goals to be met by: 2019    Patient will increase functional independence with mobility by performin. Sit<>stand with SBA with RW.  2. Gait x 150ft with RW feet with RW with SBA.  3. Ascend/descend 1 6 in platform step(s) with least restrictive assistive device and RW.    Outcome: Ongoing (interventions implemented as appropriate)  Pt seated in bedside chair upon arrival. Pt performed quad sets, SLR, hip abd/add, LAQ & HS x10 B LE in bedside chair. Sit<>stand from bedside chair Naheed for lifting to standing with RW. Pt performed stepping transfer to bedside commode with Naheed for posture & balance. Pt ambulated 40ft with RW & Naheed for RW management- decreased step length on R LE, decreased rani, poor RW management. Pt reports pain in L LE & requested to end session. Pt left in bedside chair with cryotherapy donned on R knee & B SCDs donned- daughter present.

## 2019-07-19 NOTE — PT/OT/SLP PROGRESS
Physical Therapy Treatment    Patient Name:  Jayne You   MRN:  [id number]    Recommendations:     Discharge Recommendations:  home health PT   Discharge Equipment Recommendations: commode, shower chair   Barriers to discharge: None    Assessment:     Jayne You is a 79 y.o. female admitted with a medical diagnosis of OA (osteoarthritis) of knee.  She presents with the following impairments/functional limitations:  weakness, gait instability, decreased ROM, impaired cardiopulmonary response to activity, impaired endurance, impaired balance, impaired joint extensibility, pain, orthopedic precautions, impaired functional mobilty .    Pt seated in bedside chair upon arrival. Pt performed quad sets, SLR, hip abd/add, LAQ & HS x10 B LE in bedside chair. Sit<>stand from bedside chair Naheed for lifting to standing with RW. Pt performed stepping transfer to bedside commode with Naheed for posture & balance. Pt ambulated 40ft with RW & Naheed for RW management- decreased step length on R LE, decreased rani, poor RW management. Pt reports pain in L LE & requested to end session. Pt left in bedside chair with cryotherapy donned on R knee & B SCDs donned- daughter present.    Rehab Prognosis: Good; patient would benefit from acute skilled PT services to address these deficits and reach maximum level of function.    Recent Surgery: Procedure(s) (LRB):  ARTHROPLASTY, KNEE, TOTAL (Left) 1 Day Post-Op    Plan:     During this hospitalization, patient to be seen BID to address the identified rehab impairments via gait training, therapeutic activities, neuromuscular re-education and progress toward the following goals:    · Plan of Care Expires:  07/25/19    Subjective     Chief Complaint: R knee pain  Patient/Family Comments/goals: decrease pain to improve mobility  Pain/Comfort:  · Pain Rating 1: 5/10  · Location - Side 1: Left  · Location - Orientation 1: generalized  · Location 1: knee  · Pain Addressed 1:  Distraction, Cessation of Activity, Reposition  · Pain Rating Post-Intervention 1: 6/10      Objective:     Communicated with nurse Ahuja prior to session.  Patient found up in chair with cryotherapy, peripheral IV upon PT entry to room.     General Precautions: Standard, diabetic, fall   Orthopedic Precautions:LLE weight bearing as tolerated   Braces: N/A     Functional Mobility:  · Transfers- sit<>stand from bedside chair Naheed for lifting/controlled lowering.  · Transfer to bedside commode via stepping transfer with RW & Naheed for steadying & RW management.   · Sit to stand from bedside commode modA for lifting (lower surface)    Gait- 40ft with RW & chair f/u. Naheed for RW management- decreased step length on R LE, decreased rani, poor RW management. Pt reports pain in L LE & requested to end session.       AM-PAC 6 CLICK MOBILITY  Turning over in bed (including adjusting bedclothes, sheets and blankets)?: 4  Sitting down on and standing up from a chair with arms (e.g., wheelchair, bedside commode, etc.): 3  Moving from lying on back to sitting on the side of the bed?: 3  Need to walk in hospital room?: 3  Climbing 3-5 steps with a railing?: 2       Therapeutic Activities and Exercises:   Pt performed quad sets, SLR, hip abd/add, LAQ & HS x10 B LE in bedside chair.    Patient left up in chair with all lines intact, call button in reach and daughter present..    GOALS:   Multidisciplinary Problems     Physical Therapy Goals        Problem: Physical Therapy Goal    Goal Priority Disciplines Outcome Goal Variances Interventions   Physical Therapy Goal     PT, PT/OT Ongoing (interventions implemented as appropriate)     Description:  Goals to be met by: 2019    Patient will increase functional independence with mobility by performin. Sit<>stand with SBA with RW.  2. Gait x 150ft with RW feet with RW with SBA.  3. Ascend/descend 1 6 in platform step(s) with least restrictive assistive device and RW.                      Time Tracking:     PT Received On: 07/19/19  PT Start Time: 1320     PT Stop Time: 1400  PT Total Time (min): 40 min     Billable Minutes: Gait Training 15 and Therapeutic Exercise 25    Treatment Type: Treatment  PT/PTA: PTA     PTA Visit Number: 1     Viviane Shields PTA  07/19/2019

## 2019-07-19 NOTE — PLAN OF CARE
Problem: Physical Therapy Goal  Goal: Physical Therapy Goal  Goals to be met by: 2019    Patient will increase functional independence with mobility by performin. Sit<>stand with SBA with RW.  2. Gait x 150ft with RW feet with RW with SBA.  3. Ascend/descend 1 6 in platform step(s) with least restrictive assistive device and RW.    Outcome: Ongoing (interventions implemented as appropriate)  Pt presented supine in bed, agreeable to PT.  Bed mobility with cuing and use of bed rails for supine>sit, cuing for sequencing.  Sit to stand to RW  With CGA with cuing for prepositioning and technique.  Gait training x 50ft with Rw CGA, cuing for sequencing, pt with steady gait, step to transition to step through pattern. Pt c/o of 10/10 pain, session ended, pt transferred to [facility] with cuing for hand placement.

## 2019-07-19 NOTE — PROGRESS NOTES
"Nephrology  Progress Note    Admit Date: 7/18/2019   LOS: 0 days     SUBJECTIVE:     Follow-up For:  OA (osteoarthritis) of knee    Interval History:     Looks great this morning.  Sitting in chair with family at bedside.  Doing decent with therapy.  Denies chest pain, shortness of breath, calf tenderness.    Review of Systems:  Constitutional: No fever or chills  Respiratory: No cough or shortness of breath  Cardiovascular: No chest pain or palpitations  Gastrointestinal: No nausea or vomiting  Neurological: No confusion or weakness    OBJECTIVE:     Vital Signs Range (Last 24H):  BP (!) 141/92 (BP Location: Right arm, Patient Position: Lying)   Pulse 105   Temp 98.6 °F (37 °C) (Oral)   Resp 20   Ht 5' 4" (1.626 m)   Wt 117.5 kg (259 lb)   SpO2 (!) 92%   Breastfeeding? No   BMI 44.46 kg/m²     Temp:  [97.2 °F (36.2 °C)-98.8 °F (37.1 °C)]   Pulse:  []   Resp:  [18-26]   BP: (119-169)/(59-92)   SpO2:  [86 %-100 %]     I & O (Last 24H):    Intake/Output Summary (Last 24 hours) at 7/19/2019 1037  Last data filed at 7/19/2019 0635  Gross per 24 hour   Intake 915 ml   Output 1170 ml   Net -255 ml       Physical Exam:  General appearance: Well developed, well nourished, morbidly obese.     Eyes:  Conjunctivae/corneas clear. PERRL.  Lungs: Normal respiratory effort,   clear to auscultation bilaterally, oral secretions.   Heart: Regular rate and rhythm, S1, S2 normal, no murmur, rub or anna.  Abdomen: Soft, non-tender non-distended; bowel sounds normal; no masses,  no organomegaly, morbid obesity.   Extremities: No cyanosis or clubbing. 1+edema.    Skin: Skin color, texture, turgor normal. No rashes or lesions  Neurologic: Normal strength and tone. No focal numbness or weakness   Left knee CDI    Laboratory Data:  Recent Labs   Lab 07/19/19 0524   WBC 8.78   RBC 4.37   HGB 11.1*   HCT 37.3   *   MCV 85   MCH 25.4*   MCHC 29.8*       BMP:   Recent Labs   Lab 07/19/19 0524   *      K 4.3 "      CO2 28   BUN 11   CREATININE 0.8   CALCIUM 8.6*     Lab Results   Component Value Date    CALCIUM 8.6 (L) 07/19/2019       Lab Results   Component Value Date    CALCIUM 8.6 (L) 07/19/2019       No results found for: URICACID    BNP  No results for input(s): BNP, BNPTRIAGEBLO in the last 168 hours.    Medications:  Medication list was reviewed and changes noted under Assessment/Plan.    Diagnostic Results:        ASSESSMENT/PLAN:     1. S/P Left Knee (M17.10):  Per ortho/therapy teams.   2. DM (E11.65):  resumed home meds but would advise against glipizide in elderly.  ASA/SSI.  3. HTN but intraop hypotension noted (I10/ I95.9):  hold parameters.  LR/albumin given. intraop.     4. KHAI with resp insuff (G47.33):  CPAP ordered QHS.   5. Morbid obesity (E66):  Needs aggressive weight mgmt.  Defer.    6. Neuropathy (G62.9):  Lower dose of Neurontin.    7. DVT prophy:  ASA BID.      Doing well considering

## 2019-07-19 NOTE — PLAN OF CARE
Problem: Occupational Therapy Goal  Goal: Occupational Therapy Goal  Goals to be met by: 07/24/19     Patient will increase functional independence with ADLs by performing:    LE Dressing with Moderate Assistance.  Toileting from bedside commode with Minimal Assistance for hygiene and clothing management.   Supine to sit with Minimal Assistance.  Stand pivot transfers with Contact Guard Assistance.  Step transfer with Contact Guard Assistance  Toilet transfer to bedside commode with Contact Guard Assistance.    Outcome: Ongoing (interventions implemented as appropriate)  Initial OT evaluation completed, with treatment to follow.  Patient with impaired endurance and pain, unable to tolerate functional mobility and standing during session.  Patient will require 24/7 care in the home for safety at this time.  Continue OT services to maximize patient functioning.    Comments: NELLA Britton, 7/19/2019

## 2019-07-19 NOTE — PT/OT/SLP PROGRESS
Physical Therapy Treatment    Patient Name:  Jayne You   MRN:  [id number]    Recommendations:     Discharge Recommendations:  home health PT   Discharge Equipment Recommendations: commode, shower chair   Barriers to discharge: None    Assessment:     Jayne You is a 79 y.o. female admitted with a medical diagnosis of OA (osteoarthritis) of knee.  She presents with the following impairments/functional limitations:  impaired endurance, gait instability, impaired functional mobilty, impaired balance, decreased lower extremity function, decreased coordination, decreased safety awareness, pain, decreased ROM, impaired coordination, edema .Pt presented supine in bed, agreeable to PT. There ex supine per TKA protocol, pt resistant to knee flexion. Bed mobility with cuing and use of bed rails for supine>sit, cuing for sequencing. Sitting there ex EOB, ROM 0-75 degrees.  Sit to stand to RW  With CGA with cuing for prepositioning and technique.  Gait training x 50ft with Rw CGA, cuing for sequencing, pt with steady gait, step to transition to step through pattern. Pt c/o of 10/10 pain, session ended, pt transferred to [facility] with cuing for hand placement.     Rehab Prognosis: Good; patient would benefit from acute skilled PT services to address these deficits and reach maximum level of function.    Recent Surgery: Procedure(s) (LRB):  ARTHROPLASTY, KNEE, TOTAL (Left) 1 Day Post-Op    Plan:     During this hospitalization, patient to be seen BID to address the identified rehab impairments via gait training, therapeutic activities, neuromuscular re-education and progress toward the following goals:    · Plan of Care Expires:  07/25/19    Subjective     Chief Complaint: pain  Patient/Family Comments/goals: decrease pain  Pain/Comfort:  · Pain Rating 1: 0/10  · Pain Rating Post-Intervention 1: 10/10  · Location - Side 2: Left  · Location - Orientation 2: generalized  · Location 2: knee  · Pain Addressed 2:  Pre-medicate for activity, Reposition, Cessation of Activity, Distraction, Nurse notified      Objective:     Communicated with nursing prior to session.  Patient found supine with peripheral IV, cryotherapy upon PT entry to room.     General Precautions: Standard, (universal)   Orthopedic Precautions:LLE weight bearing as tolerated   Braces: N/A     Functional Mobility:  · Bed Mobility:  cuing and use of bed rails for supine>sit, cuing for sequencing.   · Transfers: Sit to stand to RW  With CGA with cuing for prepositioning and technique   · Gait:  Gait training x 50ft with Rw CGA, cuing for sequencing, pt with steady gait, step to transition to step through pattern.      AM-PAC 6 CLICK MOBILITY  Turning over in bed (including adjusting bedclothes, sheets and blankets)?: 4  Sitting down on and standing up from a chair with arms (e.g., wheelchair, bedside commode, etc.): 3  Moving from lying on back to sitting on the side of the bed?: 3  Moving to and from a bed to a chair (including a wheelchair)?: 3  Need to walk in hospital room?: 3  Climbing 3-5 steps with a railing?: 2  Basic Mobility Total Score: 18       Therapeutic Activities and Exercises:   Pt presented supine in bed, agreeable to PT.  Bed mobility with cuing and use of bed rails for supine>sit, cuing for sequencing.  Sit to stand to RW  With CGA with cuing for prepositioning and technique.  Gait training x 50ft with Rw CGA, cuing for sequencing, pt with steady gait, step to transition to step through pattern. Pt c/o of 10/10 pain, session ended, pt transferred to [facility] with cuing for hand placement.     Patient left up in chair with all lines intact, call button in reach and nursing notified..    GOALS:   Multidisciplinary Problems     Physical Therapy Goals        Problem: Physical Therapy Goal    Goal Priority Disciplines Outcome Goal Variances Interventions   Physical Therapy Goal     PT, PT/OT Ongoing (interventions implemented as appropriate)      Description:  Goals to be met by: 2019    Patient will increase functional independence with mobility by performin. Sit<>stand with SBA with RW.  2. Gait x 150ft with RW feet with RW with SBA.  3. Ascend/descend 1 6 in platform step(s) with least restrictive assistive device and RW.                     Time Tracking:     PT Received On: 19  PT Start Time: 732     PT Stop Time: 804  PT Total Time (min): 32 min     Billable Minutes: Gait Training 14 and Therapeutic Activity 18    Treatment Type: Treatment  PT/PTA: PT     PTA Visit Number: 0     Hira Shanks, PT  2019

## 2019-07-19 NOTE — PLAN OF CARE
Problem: Adult Inpatient Plan of Care  Goal: Plan of Care Review  Outcome: Ongoing (interventions implemented as appropriate)  Patient on BIPAP on documented settings. No distress noted, will continue to monitor.

## 2019-07-19 NOTE — PLAN OF CARE
Problem: Adult Inpatient Plan of Care  Goal: Patient-Specific Goal (Individualization)  Outcome: Ongoing (interventions implemented as appropriate)     07/19/19 0458   OTHER   Anxieties, Fears or Concerns Pain management.   Individualized Care Needs Transferring with assistance.   Patient-Specific Goal (Individualization)   Patient-Specific Goals (Include Timeframe) To become more independent.     POC reviewed with patient and patient's daughter; both verbalize understanding. AAOx4. Compliant with medication administration regimen. Patient presents minimal effort upon transitioning and using bedside commode. Blood sugars elevated (200s) throughout shift. Remained free from falls/injuries. Daughter @ bedside. Safety precautions in place; call light within reach, bed in low position, wheels locked, side rails up x2. Will continue to monitor.

## 2019-07-19 NOTE — PLAN OF CARE
Problem: Pain Acute  Goal: Optimal Pain Control  Outcome: Ongoing (interventions implemented as appropriate)  Pt pain scale assessed, pt encouraged to notify nurse of new/worsening pain, nurse reviewed scheduled and/or PRN pain medications with patient, also pt educated on non pharmacological pain management, plan of care reviewed with the patient, understanding verbalized, will continue to monitor.

## 2019-07-20 VITALS
DIASTOLIC BLOOD PRESSURE: 97 MMHG | BODY MASS INDEX: 44.22 KG/M2 | HEART RATE: 104 BPM | RESPIRATION RATE: 20 BRPM | SYSTOLIC BLOOD PRESSURE: 137 MMHG | OXYGEN SATURATION: 96 % | WEIGHT: 259 LBS | HEIGHT: 64 IN | TEMPERATURE: 99 F

## 2019-07-20 LAB
ANION GAP SERPL CALC-SCNC: 10 MMOL/L (ref 8–16)
BASOPHILS # BLD AUTO: 0.04 K/UL (ref 0–0.2)
BASOPHILS NFR BLD: 0.4 % (ref 0–1.9)
BUN SERPL-MCNC: 6 MG/DL (ref 8–23)
CALCIUM SERPL-MCNC: 9 MG/DL (ref 8.7–10.5)
CHLORIDE SERPL-SCNC: 98 MMOL/L (ref 95–110)
CO2 SERPL-SCNC: 28 MMOL/L (ref 23–29)
CREAT SERPL-MCNC: 0.7 MG/DL (ref 0.5–1.4)
DIFFERENTIAL METHOD: ABNORMAL
EOSINOPHIL # BLD AUTO: 0.1 K/UL (ref 0–0.5)
EOSINOPHIL NFR BLD: 0.5 % (ref 0–8)
ERYTHROCYTE [DISTWIDTH] IN BLOOD BY AUTOMATED COUNT: 14.6 % (ref 11.5–14.5)
EST. GFR  (AFRICAN AMERICAN): >60 ML/MIN/1.73 M^2
EST. GFR  (NON AFRICAN AMERICAN): >60 ML/MIN/1.73 M^2
GLUCOSE SERPL-MCNC: 164 MG/DL (ref 70–110)
HCT VFR BLD AUTO: 36.7 % (ref 37–48.5)
HGB BLD-MCNC: 11.5 G/DL (ref 12–16)
IMM GRANULOCYTES # BLD AUTO: 0.03 K/UL (ref 0–0.04)
IMM GRANULOCYTES NFR BLD AUTO: 0.3 % (ref 0–0.5)
LYMPHOCYTES # BLD AUTO: 1.1 K/UL (ref 1–4.8)
LYMPHOCYTES NFR BLD: 10.3 % (ref 18–48)
MCH RBC QN AUTO: 25.4 PG (ref 27–31)
MCHC RBC AUTO-ENTMCNC: 31.3 G/DL (ref 32–36)
MCV RBC AUTO: 81 FL (ref 82–98)
MONOCYTES # BLD AUTO: 0.9 K/UL (ref 0.3–1)
MONOCYTES NFR BLD: 8.4 % (ref 4–15)
NEUTROPHILS # BLD AUTO: 8.8 K/UL (ref 1.8–7.7)
NEUTROPHILS NFR BLD: 80.1 % (ref 38–73)
NRBC BLD-RTO: 0 /100 WBC
PLATELET # BLD AUTO: 131 K/UL (ref 150–350)
PLATELET BLD QL SMEAR: ABNORMAL
PMV BLD AUTO: ABNORMAL FL (ref 9.2–12.9)
POCT GLUCOSE: 188 MG/DL (ref 70–110)
POCT GLUCOSE: 209 MG/DL (ref 70–110)
POCT GLUCOSE: 251 MG/DL (ref 70–110)
POTASSIUM SERPL-SCNC: 3.5 MMOL/L (ref 3.5–5.1)
RBC # BLD AUTO: 4.53 M/UL (ref 4–5.4)
SODIUM SERPL-SCNC: 136 MMOL/L (ref 136–145)
WBC # BLD AUTO: 11.03 K/UL (ref 3.9–12.7)

## 2019-07-20 PROCEDURE — 85025 COMPLETE CBC W/AUTO DIFF WBC: CPT

## 2019-07-20 PROCEDURE — 94761 N-INVAS EAR/PLS OXIMETRY MLT: CPT

## 2019-07-20 PROCEDURE — 25000003 PHARM REV CODE 250: Performed by: ORTHOPAEDIC SURGERY

## 2019-07-20 PROCEDURE — 97530 THERAPEUTIC ACTIVITIES: CPT

## 2019-07-20 PROCEDURE — 97116 GAIT TRAINING THERAPY: CPT | Mod: 59

## 2019-07-20 PROCEDURE — 94660 CPAP INITIATION&MGMT: CPT

## 2019-07-20 PROCEDURE — 36415 COLL VENOUS BLD VENIPUNCTURE: CPT

## 2019-07-20 PROCEDURE — 97110 THERAPEUTIC EXERCISES: CPT

## 2019-07-20 PROCEDURE — 99900035 HC TECH TIME PER 15 MIN (STAT)

## 2019-07-20 PROCEDURE — 25000003 PHARM REV CODE 250: Performed by: NURSE PRACTITIONER

## 2019-07-20 PROCEDURE — 80048 BASIC METABOLIC PNL TOTAL CA: CPT

## 2019-07-20 RX ADMIN — ASPIRIN 81 MG CHEWABLE TABLET 81 MG: 81 TABLET CHEWABLE at 09:07

## 2019-07-20 RX ADMIN — AMLODIPINE BESYLATE 10 MG: 5 TABLET ORAL at 09:07

## 2019-07-20 RX ADMIN — OXYCODONE HYDROCHLORIDE AND ACETAMINOPHEN 1 TABLET: 10; 325 TABLET ORAL at 10:07

## 2019-07-20 RX ADMIN — GABAPENTIN 100 MG: 100 CAPSULE ORAL at 09:07

## 2019-07-20 RX ADMIN — FAMOTIDINE 20 MG: 20 TABLET, FILM COATED ORAL at 09:07

## 2019-07-20 RX ADMIN — LISINOPRIL 40 MG: 20 TABLET ORAL at 09:07

## 2019-07-20 RX ADMIN — MUPIROCIN 1 G: 20 OINTMENT TOPICAL at 09:07

## 2019-07-20 RX ADMIN — POLYETHYLENE GLYCOL 3350 17 G: 17 POWDER, FOR SOLUTION ORAL at 09:07

## 2019-07-20 RX ADMIN — DULOXETINE 60 MG: 30 CAPSULE, DELAYED RELEASE ORAL at 09:07

## 2019-07-20 RX ADMIN — GLIPIZIDE 5 MG: 5 TABLET, FILM COATED, EXTENDED RELEASE ORAL at 09:07

## 2019-07-20 NOTE — PLAN OF CARE
Problem: Noninvasive Ventilation Acute  Goal: Effective Unassisted Ventilation and Oxygenation    Intervention: Monitor and Manage Noninvasive Ventilation  Patient received on Bipap, settings as documented. Sats 97%. Pt. in no distress, will continue to monitor.

## 2019-07-20 NOTE — PLAN OF CARE
Problem: Adult Inpatient Plan of Care  Goal: Plan of Care Review  Outcome: Ongoing (interventions implemented as appropriate)  Patient is alert and oriented x4. RR even and unlabored, SOB upon exertion is noted. Patient remains afebrile and vitals stable throughout shift. Incision to left knee remains CDI. Continuous cooling device in place. Patient had x3 incontinent episodes this shift. Patient refuses to call when she needs to use restroom. Stated it's too hard to get and just rather go on pad. Informed pt importance of being active and not lying in urine. Patient used bedpan for the remainder of shift. BG is 186, no insulin is given.Patient remained pain free this shift. Patient remains free from falls and injury this shift. Bed is locked and in lowest postion, side rails up x 2, call light in reach.

## 2019-07-20 NOTE — PT/OT/SLP PROGRESS
Physical Therapy Treatment    Patient Name:  Jayne You   MRN:  [id number]    Recommendations:     Discharge Recommendations:  home health PT, home health OT, home with home health(and assistance from daughter)   Discharge Equipment Recommendations: shower chair(3-in-1 commode)   Barriers to discharge: None (pt will need min A for t/f's and ambulation at this time; issued gt belt to pt/daughter)     Assessment:     Jayne You is a 79 y.o. female admitted with a medical diagnosis of OA (osteoarthritis) of knee.  She presents with the following impairments/functional limitations:  weakness, impaired endurance, impaired self care skills, impaired functional mobilty, gait instability, impaired balance, decreased lower extremity function, pain, impaired skin, edema, decreased ROM, decreased safety awareness, orthopedic precautions ;pt with fair mobility today, only able to amb short distances at this time, poor endurance,  and req's CGA/min.A for safety. Discussed discharge with daughter and pt extensively. Daughter assisted pt using gt belt with t/f sit to stand and amb. 15'.     Rehab Prognosis: Fair; patient would benefit from acute skilled PT services to address these deficits and reach maximum level of function.    Recent Surgery: Procedure(s) (LRB):  ARTHROPLASTY, KNEE, TOTAL (Left) 2 Days Post-Op    Plan:     During this hospitalization, patient to be seen BID to address the identified rehab impairments via gait training, therapeutic activities, therapeutic exercises, neuromuscular re-education and progress toward the following goals:    · Plan of Care Expires:  07/25/19    Subjective     Chief Complaint: pain, fatigue  Patient/Family Comments/goals: pt agreeable to session, reports her daughter is here, but in the car at this time, waiting for her on 2nd floor. Requested that daughter come into hospital for discharge training and instructions.   Pain/Comfort:  · Pain Rating 1:  "10/10  · Location - Side 1: Left  · Location - Orientation 1: generalized  · Location 1: knee  · Pain Addressed 1: Reposition, Distraction, Nurse notified(pain meds given at start of session, pt to be discharged soon)  · Pain Rating Post-Intervention 1: 9/10(at end of session)      Objective:     Communicated with nurse prior to session.  Patient found up in chair with urine on floor and ace wrap on LLE soiled as well, upon PT entry to room. Daughter arrived shortly after.     General Precautions: Standard, diabetic, fall   Orthopedic Precautions:LLE weight bearing as tolerated   Braces: N/A     Functional Mobility:  · Transfers:     · Sit to Stand:  minimum assistance with rolling walker  · Gait: amb'd 30' , 30', and 15' with RW and CGA/min.A, chair following for safety.   · Stairs:  Pt ascended/descended 4" curb step with Rolling Walker with no handrails with Minimal Assistance.       AM-PAC 6 CLICK MOBILITY  Turning over in bed (including adjusting bedclothes, sheets and blankets)?: 4  Sitting down on and standing up from a chair with arms (e.g., wheelchair, bedside commode, etc.): 3  Moving from lying on back to sitting on the side of the bed?: 3  Moving to and from a bed to a chair (including a wheelchair)?: 3  Need to walk in hospital room?: 3  Climbing 3-5 steps with a railing?: 2  Basic Mobility Total Score: 18       Therapeutic Activities and Exercises:   Nsg present to assist with dressing change on LLE, clean socks donned, mesh panties with pad donned to assist with incontinence (no briefs avail). Offered pt commode chair , perf'd t/f with min.A, but pt did not have to urinate at this time.  perf'd supine AP's, QS, AAROM SLR's, hip abd/add, AAROM heelslides. Seated LAQ"s and heelslides x 10 ea. Pt with ~80* seated flexion.   HR:115, O2:94% on RA with activity    Patient left up in chair with daughter present. All questions answered.     GOALS:   Multidisciplinary Problems     Physical Therapy Goals        " Problem: Physical Therapy Goal    Goal Priority Disciplines Outcome Goal Variances Interventions   Physical Therapy Goal     PT, PT/OT Ongoing (interventions implemented as appropriate)     Description:  Goals to be met by: 2019    Patient will increase functional independence with mobility by performin. Sit<>stand with SBA with RW.  2. Gait x 150ft with RW feet with RW with SBA.  3. Ascend/descend 1 6 in platform step(s) with least restrictive assistive device and RW.                     Time Tracking:     PT Received On: 19  PT Start Time: 1016     PT Stop Time: 1120  PT Total Time (min): 64 min     Billable Minutes: Gait Training 34, Therapeutic Activity 12 and Therapeutic Exercise 18    Treatment Type: Treatment  PT/PTA: PTA     PTA Visit Number: 2     Justa Rowell PTA  2019

## 2019-07-20 NOTE — PLAN OF CARE
"Problem: Physical Therapy Goal  Goal: Physical Therapy Goal  Goals to be met by: 2019    Patient will increase functional independence with mobility by performin. Sit<>stand with SBA with RW.  2. Gait x 150ft with RW feet with RW with SBA.  3. Ascend/descend 1 6 in platform step(s) with least restrictive assistive device and RW.    Pt slowly progressing towards goals, sit to stand Naheed from chair with arms, amb'd 30' x 2 with RW and CGA/min.A, chair following for safety. Asc/desc 4" step with RW and min.A. Instructed pt and daughter on safety in /around home, issued gt belt for added security. Pt with poor endurance, inc. HR with min.activity.  HHPT/OT and  assistance from daughter initially.        "

## 2019-07-20 NOTE — PROGRESS NOTES
"Nephrology  Progress Note    Admit Date: 7/18/2019   LOS: 0 days     SUBJECTIVE:     Follow-up For:  OA (osteoarthritis) of knee    Interval History:    Denies chest pain, shortness of breath, calf tenderness. Some pain at perative site. Has urinated after espinoza removal without difficulty.    Review of Systems:  Constitutional: No fever or chills  Respiratory: No cough or shortness of breath  Cardiovascular: No chest pain or palpitations  Gastrointestinal: No nausea or vomiting  Neurological: No confusion or weakness    OBJECTIVE:     Vital Signs Range (Last 24H):  BP (!) 137/97 (BP Location: Right arm, Patient Position: Lying)   Pulse 104   Temp 98.5 °F (36.9 °C) (Oral)   Resp 20   Ht 5' 4" (1.626 m)   Wt 117.5 kg (259 lb)   SpO2 96%   Breastfeeding? No   BMI 44.46 kg/m²     Temp:  [97.4 °F (36.3 °C)-99.1 °F (37.3 °C)]   Pulse:  [102-107]   Resp:  [18-26]   BP: (137-192)/(77-97)   SpO2:  [92 %-97 %]     I & O (Last 24H):    Intake/Output Summary (Last 24 hours) at 7/20/2019 1004  Last data filed at 7/19/2019 1706  Gross per 24 hour   Intake --   Output 600 ml   Net -600 ml       Physical Exam:  General appearance: Well developed, well nourished, morbidly obese.     Eyes:  Conjunctivae/corneas clear. PERRL.  Lungs: Normal respiratory effort,   clear to auscultation bilaterally, oral secretions.   Heart: Regular rate and rhythm, S1, S2 normal, no murmur, rub or anna.  Abdomen: Soft, non-tender non-distended; bowel sounds normal; no masses,  no organomegaly, morbid obesity.   Extremities: No cyanosis or clubbing. 1+edema.    Skin: Skin color, texture, turgor normal. No rashes or lesions  Neurologic: Normal strength and tone. No focal numbness or weakness   Left knee CDI    Laboratory Data:  Recent Labs   Lab 07/20/19  0432   WBC 11.03   RBC 4.53   HGB 11.5*   HCT 36.7*   *   MCV 81*   MCH 25.4*   MCHC 31.3*       BMP:   Recent Labs   Lab 07/20/19  0432   *      K 3.5   CL 98   CO2 28 "   BUN 6*   CREATININE 0.7   CALCIUM 9.0     Lab Results   Component Value Date    CALCIUM 9.0 07/20/2019       Lab Results   Component Value Date    CALCIUM 9.0 07/20/2019       No results found for: URICACID    BNP  No results for input(s): BNP, BNPTRIAGEBLO in the last 168 hours.    Medications:  Medication list was reviewed and changes noted under Assessment/Plan.    Diagnostic Results:        ASSESSMENT/PLAN:     1. S/P Left Knee (M17.10):  Per ortho/therapy teams.   2. DM (E11.65):  resumed home meds but would advise against glipizide in elderly.  ASA/SSI.  3. HTN but intraop hypotension noted (I10/ I95.9):  Bp now mildly elevated, resume home meds..     4. KHAI with resp insuff (G47.33):  CPAP ordered QHS.   5. Morbid obesity (E66):  Needs aggressive weight mgmt.  Defer.    6. Neuropathy (G62.9):  Lower dose of Neurontin.    7. DVT prophy:  ASA BID.    Okay for discharge

## 2019-07-20 NOTE — PROGRESS NOTES
Patient postop day 1.  From on his left total knee arthroplasty.  The patient is sitting up in bedside chair in no acute distress.  Left knee dressing intact. Neurovascular status stable.   hemoglobin 11 hematocrit 36.   Plan:  Discharge home this morning with home health physical therapy.            Continue gait training and range of motion to left knee.

## 2019-07-20 NOTE — NURSING
Patient and daughter at bedside given discharge instructions, including diagnosis, medication, incision care, and follow up appointment.  Patient's daughter verbalized understanding.

## 2019-07-20 NOTE — DISCHARGE INSTRUCTIONS
Knee Athroscopy Discharge Instructions    1) Pain: After surgery your knee will be sore. The knee will likely have been injected with a numbing medicine (Exparel) prior to completion of surgery for pain control. This is indicated on a green bracelet that you will continue to wear for 4 days after surgery. You will   also get a prescription for pain control before you leave the hospital. Ice and elevation will assist with pain control.    a) Apply ice as much as possible for the first 72 hours. After 72 hours, apply ice for 20minutes 3-4 times a day, after therapy,after exercising or whenever experiencing pain. Avoid direct skin contact with ice to prevent frostbit.          b) Elevate the affected leg with the pillow the length of the leg  to assist with swelling and pain.  2) Incision Care:  a) Some drainage from the incision in the first 72 hours is normal. If drainage is excessive,remove bandage,  pat dry, cover with sterile gauze and secure with tape. Notify physician about excessive drainage. Staples will be removed 14 days after surgery   3) Activity:  a) Perform exercises 2-3 x day.  b) DR TRONCOSO PATIENTS CANNOT SHOWER UNTIL STAPLES ARE REMOVED. Support help is mandatory during showering. If the dressing becomes wet, replace with a new dressing.   c) Wear thigh high cassandra hose stockings for 3 weeks after surgery .You may remove stockings for 1- 2 hours during the day only. Send patient home with an extra pair cassandra hose.If your physician orders the CPM machine you are to use it for 2 hours in the am and 2 hours in the pm.Increase the flexion 5 degrees each session if tolerated. This is not to replace your exercise program.  4) For lifetime after your replacement surgery, you may need antibiotic coverage before dental or minor surgical procedures.  5) Possible Complications: Call Surgeon  a) Infection: Report these signs and symptoms to your surgeon.  i) Unexpected redness around incision   ii) Persistent  drainage from wound after 72 hours.  iii) Temperature ,can be treated with Tylenol. Do not go to the emergency room or urgent care center, call your surgeon.   iv) Additional swelling  v) Pain not controlled with current pain medication  b) Blood Clot: Report theses signs and symptoms to your surgeon  i) Unusual pain  ii) Red or discolored skin  iii) Swelling in the leg  iv) Unusual warm skin

## 2019-07-21 NOTE — PT/OT/SLP DISCHARGE
Occupational Therapy Discharge Summary    Jayne You  MRN: [id number]   Principal Problem: OA (osteoarthritis) of knee      Patient Discharged from acute Occupational Therapy on 7/30/19.  Please refer to prior OT note dated 7/20/19 for functional status.    Assessment:      Patient has not met goals. evaluation only    Objective:     GOALS:   Multidisciplinary Problems     Occupational Therapy Goals     Not on file          Multidisciplinary Problems (Resolved)        Problem: Occupational Therapy Goal    Goal Priority Disciplines Outcome Interventions   Occupational Therapy Goal   (Resolved)     OT, PT/OT Outcome(s) achieved    Description:  Goals to be met by: 07/24/19     Patient will increase functional independence with ADLs by performing:    LE Dressing with Moderate Assistance.  Toileting from bedside commode with Minimal Assistance for hygiene and clothing management.   Supine to sit with Minimal Assistance.  Stand pivot transfers with Contact Guard Assistance.  Step transfer with Contact Guard Assistance  Toilet transfer to bedside commode with Contact Guard Assistance.                      Reasons for Discontinuation of Therapy Services  Transfer to alternate level of care.      Plan:     Patient Discharged to: Home with Home Health Service    NELLA Spaulding  7/21/2019

## 2020-05-18 DIAGNOSIS — R29.898 LOWER EXTREMITY WEAKNESS: ICD-10-CM

## 2020-05-18 DIAGNOSIS — M79.606 LOWER EXTREMITY PAIN: Primary | ICD-10-CM

## 2020-05-27 ENCOUNTER — CLINICAL SUPPORT (OUTPATIENT)
Dept: REHABILITATION | Facility: HOSPITAL | Age: 80
End: 2020-05-27
Payer: MEDICARE

## 2020-05-27 DIAGNOSIS — R29.898 WEAKNESS OF LEFT LOWER EXTREMITY: ICD-10-CM

## 2020-05-27 DIAGNOSIS — M79.605 PAIN OF LEFT LOWER EXTREMITY: ICD-10-CM

## 2020-05-27 PROCEDURE — 97110 THERAPEUTIC EXERCISES: CPT | Mod: PO

## 2020-05-27 PROCEDURE — 97161 PT EVAL LOW COMPLEX 20 MIN: CPT | Mod: PO

## 2020-05-27 NOTE — PLAN OF CARE
[facility]  Physical Therapy Initial Evaluation    Date: 5/27/2020   Name: Jayne You  Clinic Number: [id number]    Therapy Diagnosis:   Encounter Diagnoses   Name Primary?    Pain of left lower extremity     Weakness of left lower extremity      Physician: Mehrdad Velazquez MD    Physician Orders: PT Eval and Treat   Medical Diagnosis from Referral:   M79.606 (ICD-10-CM) - Lower extremity pain   R29.898 (ICD-10-CM) - Lower extremity weakness   Evaluation Date: 5/27/2020  Authorization Period Expiration: 06/20/2020  Plan of Care Expiration: 7/27/2020  Visit # / Visits authorized: 1/ 1    Time In: 8:45am  Time Out: 9:30am  Total Appointment Time (timed & untimed codes): 45 minutes    Precautions: Standard    Subjective   Date of onset: 5/18/2020  History of current condition - Jayne reports: pt underwent left TKA on 7/19/2019 and has history of 3 back surgeries.  She has increased pain in left hip and knee recently causing difficulty with house work, prolonged standing, walking, transferring in and out of tub, and squatting, kneeling.       Medical History:   Past Medical History:   Diagnosis Date    Cardiomyopathy     Depression     Diabetes mellitus     Hypertension     Lipids abnormal     Lumbar herniated disc     Obesity     RBBB     Sleep apnea        Surgical History:   Jayne You  has a past surgical history that includes Total hip arthroplasty (Right, 2006); Back surgery (2014,2016,2017); Eye surgery (Bilateral); Appendectomy; Breast surgery (Bilateral); Joint replacement (Right, 2006); and Total knee arthroplasty (Left, 7/18/2019).    Medications:   Jayne has a current medication list which includes the following prescription(s): amlodipine, aspirin, buprenorphine hcl, cyclobenzaprine, ergocalciferol, furosemide, gabapentin, glipizide, lisinopril, meloxicam, ondansetron, and oxycodone-acetaminophen.    Allergies:   Review of patient's  allergies indicates:   Allergen Reactions    Codeine Nausea And Vomiting     Pt reports can take percocet        Imaging, x-ray: 2019  FINDINGS:  There are surgical changes related to left knee arthroplasty with the metallic prosthesis appearing in satisfactory position.  No acute fractures are identified.  There is soft tissue air present consistent with patient's recent surgery.  There are skin staples noted anteriorly.    Prior Therapy: yes following TKA  Social History: single story home lives alone  Occupation: retired  Prior Level of Function: independent  Current Level of Function: independent    Pain:  Current 1/10, worst 10/10, best 1/10   Location: left hip, knee, along lateral and posterior aspect of tihigh  Description: Aching and Burning  Aggravating Factors: Standing, Bending, Walking, Lifting and Getting out of bed/chair  Easing Factors: nothing    Pts goals: decrease left LE pain and return to prior level of function    Objective     Observation: pt is 80 year old female that presents to therapy in no apparent distress.    Range of Motion:   Knee Left active Left Passive Right Active R passive   Flexion 115 120 120 125   Extension 0 NT 0  NT         Lower Extremity Strength  Right LE  Left LE    Knee extension: 4/5 Knee extension: 3+/5   Knee flexion: 4+/5 Knee flexion: 3+/5   Hip flexion: 4/5 Hip flexion: 4-/5   Hip extension:  4/5 Hip extension: 4-/5   Hip abduction: 4-/5 Hip abduction: 3+/5   Hip adduction: 4/5 Hip adduction 4/5   Ankle dorsiflexion: 5/5 Ankle dorsiflexion: 5/5   Ankle plantarflexion: 5/5 Ankle plantarflexion: 5/5         Function:    - SLS R: 8 seconds  - SLS L: 3 seconds  - Squat: unable  - Sit <--> Stand: uses UE for assistance  - TU seconds      Joint Mobility: hypomobile AP and patella mobility testing of left knee    Palpation: tenderness to palpation in gluteus medius, minimus, jax, piriformis, IT band, quads, hamstrings    Sensation: intact to light  "touch    Flexibility:    Popliteal Angle: R = 40 degrees ; L = 40 degrees   Chandrakant's test: R = neg ; L = pos   Brant test: R = tightness in quads ; L = tightness in quads    Edema: none noted at time of exam      Limitation/Restriction for FOTO Lower Leg Survey    Therapist reviewed FOTO scores for Jayne You on 5/27/2020.   FOTO documents entered into [employer] - see Media section.    Limitation Score: 72%         TREATMENT   Treatment Time In: 9:20am  Treatment Time Out: 9:30am  Total Treatment time (time-based codes) separate from Evaluation: 10 minutes    Jayne received therapeutic exercises to develop strength, ROM, flexibility and core stabilization for 10 minutes including:    Date  5/27/2020   VISIT 1/1       POC EXP. DATE 7/27/2020   VISIT AMOUNT  MEDICARE TOTAL 113.20   FACE-TO-FACE 6/27/2020   FOTO 1/5       TABLE:    Hamstring stretch 3 x 10"   IT band stretch 3 x 10"   Pelvic tilt --   bridging 1 x 10   SLR 1 x 10   Hip abduction 1 x 10   clamshell 1 x 10   Hip adduction --   SAQ --           SEATED:    LAQ --   Hamstring curl --   Hip flexion --   STANDING:    Step up --   Mini-squat --   Single leg stance --                   Initials MA         Home Exercises and Patient Education Provided    Education provided:   - role of PT and therapy goals  - HEP and proper form/technique for exercises    Written Home Exercises Provided: yes.  Exercises were reviewed and Jayne was able to demonstrate them prior to the end of the session.  Jayne demonstrated good  understanding of the education provided.     See EMR under Patient Instructions for exercises provided 5/27/2020.    Assessment   Jayne is a 80 y.o. female referred to outpatient Physical Therapy with a medical diagnosis of left LE pain and weakness. Pt presents with signs and symptoms consistent with the diagnosis.  She ambulates with antalgic gait, noted to have decreased ROM, decreased strength, decreased standing balance, and difficult " with transfers.  Pt would benefit from manual therapy, LE stretching and strengthening, core stabilization exercises, balance and gait training, and modalities to decrease pain, improve functional mobility and return to prior level of function.    Pt prognosis is Good.   Pt will benefit from skilled outpatient Physical Therapy to address the deficits stated above and in the chart below, provide pt/family education, and to maximize pt's level of independence.     Plan of care discussed with patient: Yes  Pt's spiritual, cultural and educational needs considered and patient is agreeable to the plan of care and goals as stated below:     Anticipated Barriers for therapy: none    Medical Necessity is demonstrated by the following  History  Co-morbidities and personal factors that may impact the plan of care Co-morbidities:   advanced age, coping style/mechanism and high BMI    Personal Factors:   age  coping style     moderate   Examination  Body Structures and Functions, activity limitations and participation restrictions that may impact the plan of care Body Regions:   lower extremities    Body Systems:    ROM  strength  balance  gait  transfers  transitions    Participation Restrictions:   none    Activity limitations:   Learning and applying knowledge  no deficits    General Tasks and Commands  no deficits    Communication  no deficits    Mobility  lifting and carrying objects  walking    Self care  no deficits    Domestic Life  shopping  cooking  doing house work (cleaning house, washing dishes, laundry)    Interactions/Relationships  no deficits    Life Areas  no deficits    Community and Social Life  no deficits         moderate   Clinical Presentation stable and uncomplicated low   Decision Making/ Complexity Score: low     Goals:  Short Term Goals:  4 weeks  1. Patient will be compliant with HEP to promote the independent management of current diagnosis.  2. Patient will increase left knee flexion to 120  degrees to improve transferring in and out of vehicle.  3. Patient will improve TUG time from 25 seconds to 17 seconds.  4. Patient will report a decrease in complaints of left knee pain to 5/10 during ADLs.      Long Term Goals:  8 weeks  1. Patient will increase strength of left knee to 4+/5 to improve transfers from low lying surfaces.  2. Patient will increase strength of left hip musculature to 4+/5 to improve stability while walking over uneven surfaces.  3. Patient will report a decrease in complaints of left knee pain to 3/10 during ADLs.  4. Patient will improve FOTO limitation status from 72% to 55% placing the patient in the 40-60% impaired, limited, or restricted category indicating increased functional mobility.      Plan   Plan of care Certification: 5/27/2020 to 7/27/2020.    Outpatient Physical Therapy 2 times weekly for 8 weeks to include the following interventions: Gait Training, Manual Therapy, Moist Heat/ Ice, Neuromuscular Re-ed, Patient Education, Therapeutic Activites, Therapeutic Exercise and IASTM, vacuum cupping, dry needling, and kinesiotaping.     Alejandro Bennett, PT

## 2020-06-03 ENCOUNTER — CLINICAL SUPPORT (OUTPATIENT)
Dept: REHABILITATION | Facility: HOSPITAL | Age: 80
End: 2020-06-03
Payer: MEDICARE

## 2020-06-03 DIAGNOSIS — R29.898 WEAKNESS OF LEFT LOWER EXTREMITY: ICD-10-CM

## 2020-06-03 DIAGNOSIS — M79.605 PAIN OF LEFT LOWER EXTREMITY: ICD-10-CM

## 2020-06-03 PROCEDURE — 97110 THERAPEUTIC EXERCISES: CPT | Mod: PO

## 2020-06-03 NOTE — PROGRESS NOTES
"  Physical Therapy Treatment Note     Name: Jayne You  Clinic Number: [id number]    Therapy Diagnosis:   Encounter Diagnoses   Name Primary?    Pain of left lower extremity     Weakness of left lower extremity      Physician:Mehrdad Velazquez MD  Visit Date: 6/3/2020    Physician Orders: PT Eval and Treat   Medical Diagnosis from Referral:   M79.606 (ICD-10-CM) - Lower extremity pain   R29.898 (ICD-10-CM) - Lower extremity weakness   Evaluation Date: 5/27/2020  Authorization Period Expiration: 06/20/2020  Plan of Care Expiration: 7/27/2020  Visit # / Visits authorized: 1/ 20    Time In: 1:15 pm  Time Out: 1:45 pm  Total Billable Time: 27 minutes    Precautions: Standard    Subjective     Pt reports: still hurting in her joints on the left side. She is still limping  She was compliant with home exercise program.  Response to previous treatment: no increase in soreness  Functional change: none    Pain: 9/10  Location: left back , knee  and hip      Objective     Jayne received therapeutic exercises to develop strength, flexibility and core stabilization for 27 minutes including:     Date  6/3/2020 5/27/2020   VISIT 1/20 1/1          POC EXP. DATE 7/27/2020 7/27/2020   VISIT AMOUNT  MEDICARE TOTAL 60.64  173.84 113.20   FACE-TO-FACE 6/27/2020 6/27/2020   FOTO 2/5 1/5          TABLE:      Hamstring stretch 5 x 10" 3 x 10"   IT band stretch 5 x 10" 3 x 10"   Pelvic tilt -- --   bridging 1 x 15 1 x 10   SLR 1 x 15 1 x 10   Hip abduction 1 x 15 1 x 10   clamshell 1 x 15 1 x 10   Hip adduction 10 x 3" w/ ball --   SAQ 1 x 10 --                 SEATED:      LAQ 1 x 10 --   Hamstring curl 1 x 10 RTB --   Hip flexion 1 x 15 --   STANDING:      Step up -- --   Mini-squat --- --   Single leg stance -- --                               Initials OFELIA PUENTE       Home Exercises Provided and Patient Education Provided     Education provided:   - compliance with HEP    Written Home Exercises Provided: Patient instructed to " cont prior HEP.  Exercises were reviewed and Jayne was able to demonstrate them prior to the end of the session.  Jayne demonstrated good  understanding of the education provided.     See EMR under Patient Instructions for exercises provided prior visit.    Assessment     Jayne was able to begin making progress towards her goals as she was able to perform all of today's activities with no increase in symptoms prior to leaving the clinic. She required occasional verbal cues to keep her exercises in a pain free range and to avoid substitutions. She ambulated into the clinic with an antalgic gait pattern.   Jayne is progressing well towards her goals.   Pt prognosis is Good.     Pt will continue to benefit from skilled outpatient physical therapy to address the deficits listed in the problem list box on initial evaluation, provide pt/family education and to maximize pt's level of independence in the home and community environment.     Pt's spiritual, cultural and educational needs considered and pt agreeable to plan of care and goals.     Anticipated barriers to physical therapy: none    Goals:   Short Term Goals:  4 weeks  1. Patient will be compliant with HEP to promote the independent management of current diagnosis. In progress, not met  2. Patient will increase left knee flexion to 120 degrees to improve transferring in and out of vehicle. In progress, not met  3. Patient will improve TUG time from 25 seconds to 17 seconds. In progress, not met  4. Patient will report a decrease in complaints of left knee pain to 5/10 during ADLs. In progress, not met        Long Term Goals:  8 weeks  1. Patient will increase strength of left knee to 4+/5 to improve transfers from low lying surfaces. In progress, not met  2. Patient will increase strength of left hip musculature to 4+/5 to improve stability while walking over uneven surfaces. In progress, not met  3. Patient will report a decrease in complaints of left knee  pain to 3/10 during ADLs. In progress, not met  4. Patient will improve FOTO limitation status from 72% to 55% placing the patient in the 40-60% impaired, limited, or restricted category indicating increased functional mobility. In progress, not met    Plan     Continue with the plan of care and increase reps with all exercises next visit.    Valeria Curtis, PT

## 2020-06-05 ENCOUNTER — CLINICAL SUPPORT (OUTPATIENT)
Dept: REHABILITATION | Facility: HOSPITAL | Age: 80
End: 2020-06-05
Payer: MEDICARE

## 2020-06-05 DIAGNOSIS — R29.898 WEAKNESS OF LEFT LOWER EXTREMITY: ICD-10-CM

## 2020-06-05 DIAGNOSIS — M79.605 PAIN OF LEFT LOWER EXTREMITY: ICD-10-CM

## 2020-06-05 PROCEDURE — 97110 THERAPEUTIC EXERCISES: CPT | Mod: PO

## 2020-06-05 NOTE — PROGRESS NOTES
"  Physical Therapy Treatment Note     Name: Jayne Rivera [place]  Clinic Number: [id number]    Therapy Diagnosis:   Encounter Diagnoses   Name Primary?    Pain of left lower extremity     Weakness of left lower extremity      Physician:Mehrdad Velazquez MD  Visit Date: 6/5/2020    Physician Orders: PT Eval and Treat   Medical Diagnosis from Referral:   M79.606 (ICD-10-CM) - Lower extremity pain   R29.898 (ICD-10-CM) - Lower extremity weakness   Evaluation Date: 5/27/2020  Authorization Period Expiration: 06/20/2020  Plan of Care Expiration: 7/27/2020  Visit # / Visits authorized: 2/ 20    Time In: 8:45 am  Time Out: 9:30 am  Total Billable Time: 45 minutes    Precautions: Standard    Subjective     Pt reports: being sore this morning.   She was compliant with home exercise program.  Response to previous treatment: increase in soreness  Functional change: none    Pain: 8/10  Location: left back, knee  and hip      Objective     Jayne received therapeutic exercises to develop strength, flexibility and core stabilization for 45 minutes including:     Date  6/5/2020 6/3/2020 5/27/2020   VISIT 2/20 1/20 1/1           POC EXP. DATE 7/27/2020 7/27/2020 7/27/2020   VISIT AMOUNT  MEDICARE TOTAL 90.96  264.80 60.64  173.84 113.20   FACE-TO-FACE 6/27/2020 6/27/2020 6/27/2020   FOTO 3/5 2/5 1/5           TABLE:       Hamstring stretch 5 x 10" 5 x 10" 3 x 10"   IT band stretch 5 x 10" 5 x 10" 3 x 10"   Pelvic tilt -- -- --   bridging 2 x 10 1 x 15 1 x 10   SLR 2 x 10 1 x 15 1 x 10   Hip abduction 2 x 10 1 x 15 1 x 10   clamshell 2 x 10 1 x 15 1 x 10   Hip adduction 15 x 3" w/ ball 10 x 3" w/ ball --   SAQ 2 x 10 1 x 10 --                   SEATED:       LAQ 1 x 15 1 x 10 --   Hamstring curl Not today 1 x 10 RTB --   Hip flexion 2 x 10 1 x 15 --   STANDING:       Step up -- -- --   Mini-squat -- --- --   Single leg stance -- -- --                                   Initials OFELIA GILBERT MA       Home Exercises Provided and Patient " Education Provided     Education provided:   - compliance with HEP    Written Home Exercises Provided: Patient instructed to cont prior HEP.  Exercises were reviewed and Jayne was able to demonstrate them prior to the end of the session.  Jayne demonstrated good  understanding of the education provided.     See EMR under Patient Instructions for exercises provided prior visit.    Assessment     Jayne performed all of today's progressions with no increase in symptoms prior to leaving the clinic. She required occasional verbal cues to avoid using momentum and correct positioning with all supine exercises.   Jayne is progressing well towards her goals.   Pt prognosis is Good.     Pt will continue to benefit from skilled outpatient physical therapy to address the deficits listed in the problem list box on initial evaluation, provide pt/family education and to maximize pt's level of independence in the home and community environment.     Pt's spiritual, cultural and educational needs considered and pt agreeable to plan of care and goals.     Anticipated barriers to physical therapy: none    Goals:   Short Term Goals:  4 weeks  1. Patient will be compliant with HEP to promote the independent management of current diagnosis. In progress, not met  2. Patient will increase left knee flexion to 120 degrees to improve transferring in and out of vehicle. In progress, not met  3. Patient will improve TUG time from 25 seconds to 17 seconds. In progress, not met  4. Patient will report a decrease in complaints of left knee pain to 5/10 during ADLs. In progress, not met        Long Term Goals:  8 weeks  1. Patient will increase strength of left knee to 4+/5 to improve transfers from low lying surfaces. In progress, not met  2. Patient will increase strength of left hip musculature to 4+/5 to improve stability while walking over uneven surfaces. In progress, not met  3. Patient will report a decrease in complaints of left knee  pain to 3/10 during ADLs. In progress, not met  4. Patient will improve FOTO limitation status from 72% to 55% placing the patient in the 40-60% impaired, limited, or restricted category indicating increased functional mobility. In progress, not met    Plan     Continue with the plan of care and begin pelvic tilts next visit.    Valeria Curtis, PT

## 2020-06-10 ENCOUNTER — CLINICAL SUPPORT (OUTPATIENT)
Dept: REHABILITATION | Facility: HOSPITAL | Age: 80
End: 2020-06-10
Payer: MEDICARE

## 2020-06-10 DIAGNOSIS — R29.898 WEAKNESS OF LEFT LOWER EXTREMITY: ICD-10-CM

## 2020-06-10 DIAGNOSIS — M79.605 PAIN OF LEFT LOWER EXTREMITY: ICD-10-CM

## 2020-06-10 PROCEDURE — 97140 MANUAL THERAPY 1/> REGIONS: CPT | Mod: PO

## 2020-06-10 PROCEDURE — 97110 THERAPEUTIC EXERCISES: CPT | Mod: PO

## 2020-06-10 NOTE — PROGRESS NOTES
"  Physical Therapy Treatment Note     Name: Jayne Rivera [place]  Clinic Number: [id number]    Therapy Diagnosis:   Encounter Diagnoses   Name Primary?    Pain of left lower extremity     Weakness of left lower extremity      Physician:Mehrdad Velazquez MD  Visit Date: 6/10/2020    Physician Orders: PT Eval and Treat   Medical Diagnosis from Referral:   M79.606 (ICD-10-CM) - Lower extremity pain   R29.898 (ICD-10-CM) - Lower extremity weakness   Evaluation Date: 5/27/2020  Authorization Period Expiration: 06/20/2020  Plan of Care Expiration: 7/27/2020  Visit # / Visits authorized: 3/ 20    Time In: 8:45 am  Time Out: 9:25 am  Total Billable Time: 40 minutes    Precautions: Standard    Subjective     Pt reports: having continued pain in hips and knees.  She states that she is performing exercises at home but no significant change in pain at this time.     She was compliant with home exercise program.  Response to previous treatment: increase in soreness  Functional change: none    Pain: 8/10  Location: left back, knee  and hip      Objective     Jayne received therapeutic exercises to develop strength, flexibility and core stabilization for 30 minutes including:     Date  6/10/2020 6/5/2020 6/3/2020 5/27/2020   VISIT 3/20 2/20 1/20 1/1            POC EXP. DATE 7/27/2020 7/27/2020 7/27/2020 7/27/2020   VISIT AMOUNT  MEDICARE TOTAL 88.10  352.90 90.96  264.80 60.64  173.84 113.20   FACE-TO-FACE 6/27/2020 6/27/2020 6/27/2020 6/27/2020   FOTO 4/5 3/5 2/5 1/5          bike  L1 x 5'       TABLE:        Hamstring stretch 5 x 10" 5 x 10" 5 x 10" 3 x 10"   IT band stretch 5 x 10" 5 x 10" 5 x 10" 3 x 10"   Piriformis stretch 5 x 10"      Pelvic tilt 15 x 3" -- -- --   bridging 2 x 10 2 x 10 1 x 15 1 x 10   TA with march 1 x 10      SLR -- 2 x 10 1 x 15 1 x 10   Hip abduction -- 2 x 10 1 x 15 1 x 10   clamshell 2 x 10 YTB 2 x 10 1 x 15 1 x 10   Hip adduction 30 x 3" w/ball 15 x 3" w/ ball 10 x 3" w/ ball --   SAQ -- 2 x 10 " 1 x 10 --                     SEATED:        LAQ -- 1 x 15 1 x 10 --   Hamstring curl -- Not today 1 x 10 RTB --   Hip flexion -- 2 x 10 1 x 15 --   STANDING:        Step up  -- -- --   Mini-squat  -- --- --   Single leg stance  -- -- --                                       Initials CINDI GILBERT MA     Pt receives manual therapy x 10 minutes:   - STM to piriformis, gluteus medius, minimus, and jax, IT band, and hamstrings  - IASTM to glutes, piriformis, and IT band    Home Exercises Provided and Patient Education Provided     Education provided:   - compliance with HEP    Written Home Exercises Provided: Patient instructed to cont prior HEP.  Exercises were reviewed and Jayne was able to demonstrate them prior to the end of the session.  Jayne demonstrated good  understanding of the education provided.     See EMR under Patient Instructions for exercises provided prior visit.    Assessment     Jayne continues to complain of constant pain in hips and knee with left hip being the most painful today.  She receives manual therapy in attempt to decrease pain, muscle guarding, and improve mobility.  She will continue with current plan of care with progression of strengthening exercises as tolerated.    Jayne is progressing well towards her goals.   Pt prognosis is Good.     Pt will continue to benefit from skilled outpatient physical therapy to address the deficits listed in the problem list box on initial evaluation, provide pt/family education and to maximize pt's level of independence in the home and community environment.     Pt's spiritual, cultural and educational needs considered and pt agreeable to plan of care and goals.     Anticipated barriers to physical therapy: none    Goals:   Short Term Goals:  4 weeks  1. Patient will be compliant with HEP to promote the independent management of current diagnosis. In progress, not met  2. Patient will increase left knee flexion to 120 degrees to improve transferring  in and out of vehicle. In progress, not met  3. Patient will improve TUG time from 25 seconds to 17 seconds. In progress, not met  4. Patient will report a decrease in complaints of left knee pain to 5/10 during ADLs. In progress, not met        Long Term Goals:  8 weeks  1. Patient will increase strength of left knee to 4+/5 to improve transfers from low lying surfaces. In progress, not met  2. Patient will increase strength of left hip musculature to 4+/5 to improve stability while walking over uneven surfaces. In progress, not met  3. Patient will report a decrease in complaints of left knee pain to 3/10 during ADLs. In progress, not met  4. Patient will improve FOTO limitation status from 72% to 55% placing the patient in the 40-60% impaired, limited, or restricted category indicating increased functional mobility. In progress, not met    Plan     Continue with the plan of care and return to all exercises next visit.      Alejandro Bennett, PT

## 2020-06-17 ENCOUNTER — CLINICAL SUPPORT (OUTPATIENT)
Dept: REHABILITATION | Facility: HOSPITAL | Age: 80
End: 2020-06-17
Payer: MEDICARE

## 2020-06-17 DIAGNOSIS — M79.605 PAIN OF LEFT LOWER EXTREMITY: ICD-10-CM

## 2020-06-17 DIAGNOSIS — R29.898 WEAKNESS OF LEFT LOWER EXTREMITY: ICD-10-CM

## 2020-06-17 PROCEDURE — 97110 THERAPEUTIC EXERCISES: CPT | Mod: PO

## 2020-06-17 NOTE — PROGRESS NOTES
"  Physical Therapy Treatment Note     Name: Jayne Rivera [place]  Clinic Number: [id number]    Therapy Diagnosis:   Encounter Diagnoses   Name Primary?    Pain of left lower extremity     Weakness of left lower extremity      Physician:Mehrdad Velazquez MD  Visit Date: 6/17/2020    Physician Orders: PT Eval and Treat   Medical Diagnosis from Referral:   M79.606 (ICD-10-CM) - Lower extremity pain   R29.898 (ICD-10-CM) - Lower extremity weakness   Evaluation Date: 5/27/2020  Authorization Period Expiration: 06/20/2020  Plan of Care Expiration: 7/27/2020  Visit # / Visits authorized: 4/ 20    Time In: 9:30 am  Time Out: 10:05 am  Total Billable Time: 30 minutes    Precautions: Standard    Subjective     Pt reports: her pain is extremely high and is getting worse   She was compliant with home exercise program.  Response to previous treatment: increase in soreness  Functional change: none    Pain: 9/10  Location: left back, knee  and hip      Objective     Jayne received therapeutic exercises to develop strength, flexibility and core stabilization for 30 minutes including:     Date  6/17/2020 6/10/2020 6/5/2020 6/3/2020 5/27/2020   VISIT 4/20 3/20 2/20 1/20 1/1             POC EXP. DATE 7/27/2020 7/27/2020 7/27/2020 7/27/2020 7/27/2020   VISIT AMOUNT  MEDICARE TOTAL 60.64  413.54   88.10  352.90 90.96  264.80 60.64  173.84 113.20   FACE-TO-FACE 6/27/2020 6/27/2020 6/27/2020 6/27/2020 6/27/2020   FOTO 5/5 4/5 3/5 2/5 1/5           bike  L1 x 5' L1 x 5'       TABLE:         Hamstring stretch 5 x 10" 5 x 10" 5 x 10" 5 x 10" 3 x 10"   IT band stretch 5 x 10" 5 x 10" 5 x 10" 5 x 10" 3 x 10"   Piriformis stretch 5 x 10" 5 x 10"      Pelvic tilt 15 x 3" 15 x 3" -- -- --   bridging 2 x 10  2 x 10 2 x 10 1 x 15 1 x 10   TA with march 1 x 10  1 x 10      SLR -- -- 2 x 10 1 x 15 1 x 10   Hip abduction -- -- 2 x 10 1 x 15 1 x 10   clamshell 2 x 10 YTB 2 x 10 YTB 2 x 10 1 x 15 1 x 10   Hip adduction 30 x 3" w/ball 30 x 3" w/ball " "15 x 3" w/ ball 10 x 3" w/ ball --   SAQ -- -- 2 x 10 1 x 10 --                       SEATED:         LAQ 1 x 15  -- 1 x 15 1 x 10 --   Hamstring curl -- -- Not today 1 x 10 RTB --   Hip flexion 1 x 15 -- 2 x 10 1 x 15 --   STANDING:         Step up L1 1 x 10   -- -- --   Mini-squat 1 x 10   -- --- --   Single leg stance Next   -- -- --                                           Initials DONTAE GILBERT MA     Pt receives manual therapy x 10 minutes:  NOT TODAY  - STM to piriformis, gluteus medius, minimus, and ajx, IT band, and hamstrings  - IASTM to glutes, piriformis, and IT band    Home Exercises Provided and Patient Education Provided     Education provided:   - compliance with HEP    Written Home Exercises Provided: Patient instructed to cont prior HEP.  Exercises were reviewed and Jayne was able to demonstrate them prior to the end of the session.  Jayne demonstrated good  understanding of the education provided.     See EMR under Patient Instructions for exercises provided prior visit.    Assessment     Jayne continues to complain of constant pain in hips and knee that is getting worse in nature.  Patient was able to progress to some standing therex with no difficulty. She will continue with current plan of care with progression of strengthening exercises as tolerated.     Jayne is progressing well towards her goals.   Pt prognosis is Good.     Pt will continue to benefit from skilled outpatient physical therapy to address the deficits listed in the problem list box on initial evaluation, provide pt/family education and to maximize pt's level of independence in the home and community environment.     Pt's spiritual, cultural and educational needs considered and pt agreeable to plan of care and goals.     Anticipated barriers to physical therapy: none    Goals:   Short Term Goals:  4 weeks  1. Patient will be compliant with HEP to promote the independent management of current diagnosis. In progress, not " met  2. Patient will increase left knee flexion to 120 degrees to improve transferring in and out of vehicle. In progress, not met  3. Patient will improve TUG time from 25 seconds to 17 seconds. In progress, not met  4. Patient will report a decrease in complaints of left knee pain to 5/10 during ADLs. In progress, not met       Long Term Goals:  8 weeks  1. Patient will increase strength of left knee to 4+/5 to improve transfers from low lying surfaces. In progress, not met  2. Patient will increase strength of left hip musculature to 4+/5 to improve stability while walking over uneven surfaces. In progress, not met  3. Patient will report a decrease in complaints of left knee pain to 3/10 during ADLs. In progress, not met  4. Patient will improve FOTO limitation status from 72% to 55% placing the patient in the 40-60% impaired, limited, or restricted category indicating increased functional mobility. In progress, not met    Plan     Continue with the plan of care. Increase repetitions of standing exercises next visit. Perform SL stance as tolerated.     Deya Teran, PT, DPT

## 2020-06-19 ENCOUNTER — CLINICAL SUPPORT (OUTPATIENT)
Dept: REHABILITATION | Facility: HOSPITAL | Age: 80
End: 2020-06-19
Payer: MEDICARE

## 2020-06-19 DIAGNOSIS — R29.898 WEAKNESS OF LEFT LOWER EXTREMITY: ICD-10-CM

## 2020-06-19 DIAGNOSIS — M79.605 PAIN OF LEFT LOWER EXTREMITY: ICD-10-CM

## 2020-06-19 PROCEDURE — 97140 MANUAL THERAPY 1/> REGIONS: CPT | Mod: PO

## 2020-06-19 PROCEDURE — 97110 THERAPEUTIC EXERCISES: CPT | Mod: PO

## 2020-06-19 NOTE — PROGRESS NOTES
"  Physical Therapy Treatment Note     Name: Jayne Rivera [place]  Clinic Number: [id number]    Therapy Diagnosis:   Encounter Diagnoses   Name Primary?    Pain of left lower extremity     Weakness of left lower extremity      Physician:Mehrdad Velazquez MD  Visit Date: 6/19/2020    Physician Orders: PT Eval and Treat   Medical Diagnosis from Referral:   M79.606 (ICD-10-CM) - Lower extremity pain   R29.898 (ICD-10-CM) - Lower extremity weakness   Evaluation Date: 5/27/2020  Authorization Period Expiration: 06/20/2020  Plan of Care Expiration: 7/27/2020  Visit # / Visits authorized: 5/ 20    Time In: 8:00 am  Time Out: 8:45 am  Total Billable Time: 42 minutes    Precautions: Standard    Subjective     Pt reports: knee is giving her more trouble today, it started bothering her more when walking from her car into the clinic.   She was compliant with home exercise program.  Response to previous treatment: increase in soreness  Functional change: none    Pain: 9/10  Location: left back, knee  and hip      Objective     Jayne received therapeutic exercises to develop strength, flexibility and core stabilization for 30 minutes including:     Date  6/19/2020 6/17/2020 6/10/2020 6/5/2020 6/3/2020 5/27/2020   VISIT 5/20 4/20 3/20 2/20 1/20 1/1              POC EXP. DATE 7/27/2020 7/27/2020 7/27/2020 7/27/2020 7/27/2020 7/27/2020   VISIT AMOUNT  MEDICARE TOTAL 90.96   60.64  413.54   88.10  352.90 90.96  264.80 60.64  173.84 113.20   FACE-TO-FACE 6/27/2020 6/27/2020 6/27/2020 6/27/2020 6/27/2020 6/27/2020   FOTO -- 5/5 4/5 3/5 2/5 1/5   Nu step L1 x 3'        bike  -- L1 x 5' L1 x 5'       TABLE:          Hamstring stretch 5 x 10" 5 x 10" 5 x 10" 5 x 10" 5 x 10" 3 x 10"   IT band stretch 5 x 10" 5 x 10" 5 x 10" 5 x 10" 5 x 10" 3 x 10"   Piriformis stretch 5 x 10" 5 x 10" 5 x 10"      Pelvic tilt 15 x 3" 15 x 3" 15 x 3" -- -- --   bridging 2 x 10 2 x 10  2 x 10 2 x 10 1 x 15 1 x 10   TA with march 1 x 10 1 x 10  1 x 10    " "  SLR -- -- -- 2 x 10 1 x 15 1 x 10   Hip abduction  -- -- 2 x 10 1 x 15 1 x 10   clamshell 2 x 10 YTB 2 x 10 YTB 2 x 10 YTB 2 x 10 1 x 15 1 x 10   Hip adduction 30 x 3" w/ ball 30 x 3" w/ball 30 x 3" w/ball 15 x 3" w/ ball 10 x 3" w/ ball --   SAQ  -- -- 2 x 10 1 x 10 --                         SEATED:          LAQ 1 x 15 1 x 15  -- 1 x 15 1 x 10 --   Hamstring curl -- -- -- Not today 1 x 10 RTB --   Hip flexion 1 x 15 1 x 15 -- 2 x 10 1 x 15 --   STANDING:          Step up Not today L1 1 x 10   -- -- --   Mini-squat Not today 1 x 10   -- --- --   Single leg stance Next? Next   -- -- --                                               Initials OFELIA GILBERT MA     Pt receives manual therapy x 10 minutes:    - STM to IT band, and hamstrings    Home Exercises Provided and Patient Education Provided     Education provided:   - compliance with HEP    Written Home Exercises Provided: Patient instructed to cont prior HEP.  Exercises were reviewed and Jayne was able to demonstrate them prior to the end of the session.  Jayne demonstrated good  understanding of the education provided.     See EMR under Patient Instructions for exercises provided prior visit.    Assessment     Jayne contineud to have complaints of increased pain today but was able to perform all of today's exercises with no increase in symptoms prior to leaving the clinic. She required occasioanl verbal cues to keep her exercises in a pain free range. She did not complete all of her usual exercises due to time needed for manual therapy. She continues to ambulate with an antalgic gait pattern during her sessions due to weakness and complaints of pain.     Jayne is progressing well towards her goals.   Pt prognosis is Good.     Pt will continue to benefit from skilled outpatient physical therapy to address the deficits listed in the problem list box on initial evaluation, provide pt/family education and to maximize pt's level of independence in the home and " community environment.     Pt's spiritual, cultural and educational needs considered and pt agreeable to plan of care and goals.     Anticipated barriers to physical therapy: none    Goals:   Short Term Goals:  4 weeks  1. Patient will be compliant with HEP to promote the independent management of current diagnosis. In progress, not met  2. Patient will increase left knee flexion to 120 degrees to improve transferring in and out of vehicle. In progress, not met  3. Patient will improve TUG time from 25 seconds to 17 seconds. In progress, not met  4. Patient will report a decrease in complaints of left knee pain to 5/10 during ADLs. In progress, not met       Long Term Goals:  8 weeks  1. Patient will increase strength of left knee to 4+/5 to improve transfers from low lying surfaces. In progress, not met  2. Patient will increase strength of left hip musculature to 4+/5 to improve stability while walking over uneven surfaces. In progress, not met  3. Patient will report a decrease in complaints of left knee pain to 3/10 during ADLs. In progress, not met  4. Patient will improve FOTO limitation status from 72% to 55% placing the patient in the 40-60% impaired, limited, or restricted category indicating increased functional mobility. In progress, not met    Plan     Continue with the plan of care. Resume standing exercises next visit and attempt to perform single leg stance next visit.     Valeria Curtis, PT, DPT

## 2020-06-24 ENCOUNTER — CLINICAL SUPPORT (OUTPATIENT)
Dept: REHABILITATION | Facility: HOSPITAL | Age: 80
End: 2020-06-24
Payer: MEDICARE

## 2020-06-24 DIAGNOSIS — R29.898 WEAKNESS OF LEFT LOWER EXTREMITY: ICD-10-CM

## 2020-06-24 DIAGNOSIS — M79.605 PAIN OF LEFT LOWER EXTREMITY: ICD-10-CM

## 2020-06-24 PROCEDURE — 97110 THERAPEUTIC EXERCISES: CPT | Mod: PO

## 2020-06-24 NOTE — PROGRESS NOTES
"  Physical Therapy Treatment Note     Name: Jayne Rivera [place]  Clinic Number: [id number]    Therapy Diagnosis:   Encounter Diagnoses   Name Primary?    Pain of left lower extremity     Weakness of left lower extremity      Physician:Mehrdad Velazquez MD  Visit Date: 6/24/2020    Physician Orders: PT Eval and Treat   Medical Diagnosis from Referral:   M79.606 (ICD-10-CM) - Lower extremity pain   R29.898 (ICD-10-CM) - Lower extremity weakness   Evaluation Date: 5/27/2020  Authorization Period Expiration: 06/20/2020  Plan of Care Expiration: 7/27/2020  Visit # / Visits authorized: 6/ 20    Time In: 9:30 am  Time Out: 10:10 am   Total Billable Time: 40 minutes    Precautions: Standard    Subjective     Pt reports: L knee hurting most today, hurting all over; didn't sleep well last night.   She was compliant with home exercise program.  Response to previous treatment: increase in soreness  Functional change: none    Pain: 9/10  Location: left back, knee  and hip      Objective     Jayne received therapeutic exercises to develop strength, flexibility and core stabilization for 40 minutes including:     Date  6/24/2020 6/19/2020 6/17/2020 6/10/2020 6/5/2020 6/3/2020 5/27/2020   VISIT 6/20 5/20 4/20 3/20 2/20 1/20 1/1               POC EXP. DATE 7/27/2020 7/27/2020 7/27/2020 7/27/2020 7/27/2020 7/27/2020 7/27/2020   VISIT AMOUNT  MEDICARE TOTAL 90.96  595.40 90.96  504.50 60.64  413.54   88.10  352.90 90.96  264.80 60.64  173.84 113.20   FACE-TO-FACE 6/27/2020 6/27/2020 6/27/2020 6/27/2020 6/27/2020 6/27/2020 6/27/2020   FOTO -- -- 5/5 4/5 3/5 2/5 1/5   Nu step Not today L1 x 3'        bike  -- -- L1 x 5' L1 x 5'       TABLE:           Hamstring stretch 5 x 10" 5 x 10" 5 x 10" 5 x 10" 5 x 10" 5 x 10" 3 x 10"   IT band stretch 5 x 10" 5 x 10" 5 x 10" 5 x 10" 5 x 10" 5 x 10" 3 x 10"   Piriformis stretch 5 x 10" 5 x 10" 5 x 10" 5 x 10"      Pelvic tilt 15 x 3" 15 x 3" 15 x 3" 15 x 3" -- -- --   bridging 2 x 10 2 x 10 2 " "x 10  2 x 10 2 x 10 1 x 15 1 x 10   TA with march 1 x 15 1 x 10 1 x 10  1 x 10      SLR -- -- -- -- 2 x 10 1 x 15 1 x 10   Hip abduction ---  -- -- 2 x 10 1 x 15 1 x 10   clamshell 3 x 10 YTB 2 x 10 YTB 2 x 10 YTB 2 x 10 YTB 2 x 10 1 x 15 1 x 10   Hip adduction 30 x 3" w/ ball 30 x 3" w/ ball 30 x 3" w/ball 30 x 3" w/ball 15 x 3" w/ ball 10 x 3" w/ ball --   SAQ --  -- -- 2 x 10 1 x 10 --                           SEATED:           LAQ 1 x 15 1 x 15 1 x 15  -- 1 x 15 1 x 10 --   Hamstring curl -- -- -- -- Not today 1 x 10 RTB --   Hip flexion Not today 1 x 15 1 x 15 -- 2 x 10 1 x 15 --   STANDING:           Step up Not today Not today L1 1 x 10   -- -- --   Mini-squat Not today Not today 1 x 10   -- --- --   Single leg stance Next? Next? Next   -- -- --                                                   Initials OFELIA GILBERT MA     Pt receives manual therapy x 0 minutes:    - STM to IT band, and hamstrings    Home Exercises Provided and Patient Education Provided     Education provided:   - compliance with HEP    Written Home Exercises Provided: Patient instructed to cont prior HEP.  Exercises were reviewed and Jayne was able to demonstrate them prior to the end of the session.  Jayne demonstrated good  understanding of the education provided.     See EMR under Patient Instructions for exercises provided prior visit.    Assessment     Jayne continues to have complaints of pain in her L knee and ambulating with an antalgic gait pattern. She was able to perform all of today's progressions with no increase in symptoms prior to leaving. She did not perform all of her usual exercises due to fatigue at the end of the session    Jayne is progressing well towards her goals.   Pt prognosis is Good.     Pt will continue to benefit from skilled outpatient physical therapy to address the deficits listed in the problem list box on initial evaluation, provide pt/family education and to maximize pt's level of independence " in the home and community environment.     Pt's spiritual, cultural and educational needs considered and pt agreeable to plan of care and goals.     Anticipated barriers to physical therapy: none    Goals:   Short Term Goals:  4 weeks  1. Patient will be compliant with HEP to promote the independent management of current diagnosis. In progress, not met  2. Patient will increase left knee flexion to 120 degrees to improve transferring in and out of vehicle. In progress, not met  3. Patient will improve TUG time from 25 seconds to 17 seconds. In progress, not met  4. Patient will report a decrease in complaints of left knee pain to 5/10 during ADLs. In progress, not met       Long Term Goals:  8 weeks  1. Patient will increase strength of left knee to 4+/5 to improve transfers from low lying surfaces. In progress, not met  2. Patient will increase strength of left hip musculature to 4+/5 to improve stability while walking over uneven surfaces. In progress, not met  3. Patient will report a decrease in complaints of left knee pain to 3/10 during ADLs. In progress, not met  4. Patient will improve FOTO limitation status from 72% to 55% placing the patient in the 40-60% impaired, limited, or restricted category indicating increased functional mobility. In progress, not met    Plan     Continue with the plan of care. Resume standing exercises next visit and attempt to perform single leg stance next visit.     Valeria Curtis, PT, DPT

## 2020-06-26 ENCOUNTER — DOCUMENTATION ONLY (OUTPATIENT)
Dept: REHABILITATION | Facility: HOSPITAL | Age: 80
End: 2020-06-26

## 2020-06-26 ENCOUNTER — CLINICAL SUPPORT (OUTPATIENT)
Dept: REHABILITATION | Facility: HOSPITAL | Age: 80
End: 2020-06-26
Payer: MEDICARE

## 2020-06-26 DIAGNOSIS — R29.898 WEAKNESS OF LEFT LOWER EXTREMITY: ICD-10-CM

## 2020-06-26 DIAGNOSIS — M79.605 PAIN OF LEFT LOWER EXTREMITY: ICD-10-CM

## 2020-06-26 PROCEDURE — 97110 THERAPEUTIC EXERCISES: CPT | Mod: PO

## 2020-06-26 NOTE — PROGRESS NOTES
Face to Face PTA Conference performed with Eunice Rojas PTA regarding patient's current status, overall progress, and plan of care    Face to Face PTA Conference performed with Alejandro Bennett PT regarding patient's current status, overall progress, and plan of care    Eunice Rojas, PTA  6/26/2020

## 2020-06-26 NOTE — PROGRESS NOTES
"  Physical Therapy Treatment Note     Name: Jayne Rivera [place]  Clinic Number: [id number]    Therapy Diagnosis:   Encounter Diagnoses   Name Primary?    Pain of left lower extremity     Weakness of left lower extremity      Physician:Mehrdad Velazquez MD  Visit Date: 6/26/2020    Physician Orders: PT Eval and Treat   Medical Diagnosis from Referral:   M79.606 (ICD-10-CM) - Lower extremity pain   R29.898 (ICD-10-CM) - Lower extremity weakness   Evaluation Date: 5/27/2020  Authorization Period Expiration: 12/03/2020  Plan of Care Expiration: 7/27/2020  Visit # / Visits authorized: 7/ 20    Time In: 8:45 am  Time Out: 9:30 am   Total Billable Time: 45 minutes    Precautions: Standard    Subjective     Pt reports: intense pain this morning with pain in knee, hip, and low back.   She was compliant with home exercise program.  Response to previous treatment: increase in soreness  Functional change: none    Pain: 9/10  Location: left back, knee  and hip      Objective     Jayne received therapeutic exercises to develop strength, flexibility and core stabilization for 40 minutes including:     Date  6/26/2020 6/24/2020 6/19/2020 6/17/2020 6/10/2020 6/5/2020 6/3/2020 5/27/2020   VISIT 7/20 6/20 5/20 4/20 3/20 2/20 1/20 1/1                POC EXP. DATE 7/27/2020 7/27/2020 7/27/2020 7/27/2020 7/27/2020 7/27/2020 7/27/2020 7/27/2020   VISIT AMOUNT  MEDICARE TOTAL 90.96  686.36 90.96  595.40 90.96  504.50 60.64  413.54   88.10  352.90 90.96  264.80 60.64  173.84 113.20   FACE-TO-FACE 7/26/2020 6/27/2020 6/27/2020 6/27/2020 6/27/2020 6/27/2020 6/27/2020 6/27/2020   FOTO  -- -- 5/5 4/5 3/5 2/5 1/5   Nu step L2 x 3' Not today L1 x 3'        bike   -- -- L1 x 5' L1 x 5'       TABLE:            Hamstring stretch 5 x 10" 5 x 10" 5 x 10" 5 x 10" 5 x 10" 5 x 10" 5 x 10" 3 x 10"   IT band stretch 5 x 10" 5 x 10" 5 x 10" 5 x 10" 5 x 10" 5 x 10" 5 x 10" 3 x 10"   Piriformis stretch 5 x 10" 5 x 10" 5 x 10" 5 x 10" 5 x 10"      Pelvic " "tilt 15 x 3" 15 x 3" 15 x 3" 15 x 3" 15 x 3" -- -- --   bridging 2 x 10 2 x 10 2 x 10 2 x 10  2 x 10 2 x 10 1 x 15 1 x 10   TA with march -- 1 x 15 1 x 10 1 x 10  1 x 10      SLR 1 x 10 -- -- -- -- 2 x 10 1 x 15 1 x 10   Hip abduction --- ---  -- -- 2 x 10 1 x 15 1 x 10   clamshell 3 x 10 RTB 3 x 10 YTB 2 x 10 YTB 2 x 10 YTB 2 x 10 YTB 2 x 10 1 x 15 1 x 10   Hip adduction 30 x 3" w/ball 30 x 3" w/ ball 30 x 3" w/ ball 30 x 3" w/ball 30 x 3" w/ball 15 x 3" w/ ball 10 x 3" w/ ball --   SAQ 2 x 10 x 1# --  -- -- 2 x 10 1 x 10 --                             SEATED:            LAQ 2 x 10 x 1# 1 x 15 1 x 15 1 x 15  -- 1 x 15 1 x 10 --   Hamstring curl 1 x 10 RTB -- -- -- -- Not today 1 x 10 RTB --   Hip flexion 1 x 15 Not today 1 x 15 1 x 15 -- 2 x 10 1 x 15 --   STANDING:            Step up L1 1 x 15 Not today Not today L1 1 x 10   -- -- --   Mini-squat 1 x 10 Not today Not today 1 x 10   -- --- --   Single leg stance next Next? Next? Next   -- -- --                                                       Initials CINDI GILBERT MA     Pt receives manual therapy x 5 minutes:    - STM to IT band, quads, and hamstrings  - patella mobilizations and A/P mobilizations to left knee    Home Exercises Provided and Patient Education Provided     Education provided:   - compliance with HEP    Written Home Exercises Provided: Patient instructed to cont prior HEP.  Exercises were reviewed and Jayne was able to demonstrate them prior to the end of the session.  Jayne demonstrated good  understanding of the education provided.     See EMR under Patient Instructions for exercises provided prior visit.    Assessment     Jayne is noted to have decreased mobility of left patella as well as continued tightness in quads, IT band, and hip musculature.  She is progressed with LE strengthening exercises in attempt to improve stability of left knee in order to improve gait and decrease stress on left hip and low back.  Pt will continue " with progression of standing exercises and continue with manual therapy as needed.      Jayne is progressing well towards her goals.   Pt prognosis is Good.     Pt will continue to benefit from skilled outpatient physical therapy to address the deficits listed in the problem list box on initial evaluation, provide pt/family education and to maximize pt's level of independence in the home and community environment.     Pt's spiritual, cultural and educational needs considered and pt agreeable to plan of care and goals.     Anticipated barriers to physical therapy: none    Goals:   Short Term Goals:  4 weeks  1. Patient will be compliant with HEP to promote the independent management of current diagnosis. In progress, not met  2. Patient will increase left knee flexion to 120 degrees to improve transferring in and out of vehicle. In progress, not met  3. Patient will improve TUG time from 25 seconds to 17 seconds. In progress, not met  4. Patient will report a decrease in complaints of left knee pain to 5/10 during ADLs. In progress, not met       Long Term Goals:  8 weeks  1. Patient will increase strength of left knee to 4+/5 to improve transfers from low lying surfaces. In progress, not met  2. Patient will increase strength of left hip musculature to 4+/5 to improve stability while walking over uneven surfaces. In progress, not met  3. Patient will report a decrease in complaints of left knee pain to 3/10 during ADLs. In progress, not met  4. Patient will improve FOTO limitation status from 72% to 55% placing the patient in the 40-60% impaired, limited, or restricted category indicating increased functional mobility. In progress, not met    Plan     Continue with the plan of care. Attempt single leg stance next visit.     Alejandro Bennett, PT, DPT

## 2020-07-01 ENCOUNTER — CLINICAL SUPPORT (OUTPATIENT)
Dept: REHABILITATION | Facility: HOSPITAL | Age: 80
End: 2020-07-01
Payer: MEDICARE

## 2020-07-01 DIAGNOSIS — M79.605 PAIN OF LEFT LOWER EXTREMITY: ICD-10-CM

## 2020-07-01 DIAGNOSIS — R29.898 WEAKNESS OF LEFT LOWER EXTREMITY: ICD-10-CM

## 2020-07-01 PROCEDURE — 97110 THERAPEUTIC EXERCISES: CPT | Mod: PO

## 2020-07-01 PROCEDURE — 97140 MANUAL THERAPY 1/> REGIONS: CPT | Mod: PO

## 2020-07-01 NOTE — PROGRESS NOTES
"  Physical Therapy Treatment Note     Name: Jayne Rivera [place]  Clinic Number: [id number]    Therapy Diagnosis:   Encounter Diagnoses   Name Primary?    Pain of left lower extremity     Weakness of left lower extremity      Physician:Mehrdad Velazquez MD  Visit Date: 7/1/2020    Physician Orders: PT Eval and Treat   Medical Diagnosis from Referral:   M79.606 (ICD-10-CM) - Lower extremity pain   R29.898 (ICD-10-CM) - Lower extremity weakness   Evaluation Date: 5/27/2020  Authorization Period Expiration: 12/03/2020  Plan of Care Expiration: 7/27/2020  Visit # / Visits authorized: 8/ 20    Time In: 9:45 am  Time Out: 10:25 am   Total Billable Time: 40 minutes    Precautions: Standard    Subjective     Pt reports: pain in left hip that radiates down lateral aspect of leg to knee.   She was compliant with home exercise program.  Response to previous treatment: increase in soreness  Functional change: none    Pain: 9/10  Location: left back, knee  and hip      Objective     Jayne received therapeutic exercises to develop strength, flexibility and core stabilization for 30 minutes including:     Date  7/1/2020 6/26/2020 6/24/2020 6/19/2020 6/17/2020 6/10/2020 6/5/2020 6/3/2020 5/27/2020   VISIT 8/20 7/20 6/20 5/20 4/20 3/20 2/20 1/20 1/1                 POC EXP. DATE 7/27/2020 7/27/2020 7/27/2020 7/27/2020 7/27/2020 7/27/2020 7/27/2020 7/27/2020 7/27/2020   VISIT AMOUNT  MEDICARE TOTAL 88.10  774.46 90.96  686.36 90.96  595.40 90.96  504.50 60.64  413.54   88.10  352.90 90.96  264.80 60.64  173.84 113.20   FACE-TO-FACE 7/26/2020 7/26/2020 6/27/2020 6/27/2020 6/27/2020 6/27/2020 6/27/2020 6/27/2020 6/27/2020   FOTO   -- -- 5/5 4/5 3/5 2/5 1/5   Nu step L2 x 5' L2 x 3' Not today L1 x 3'        bike    -- -- L1 x 5' L1 x 5'       TABLE:             Hamstring stretch 5 x 10" 5 x 10" 5 x 10" 5 x 10" 5 x 10" 5 x 10" 5 x 10" 5 x 10" 3 x 10"   IT band stretch 5 x 10" 5 x 10" 5 x 10" 5 x 10" 5 x 10" 5 x 10" 5 x 10" 5 x 10" 3 " "x 10"   Piriformis stretch 5 x 10" 5 x 10" 5 x 10" 5 x 10" 5 x 10" 5 x 10"      Pelvic tilt 15 x 3" 15 x 3" 15 x 3" 15 x 3" 15 x 3" 15 x 3" -- -- --   bridging -- 2 x 10 2 x 10 2 x 10 2 x 10  2 x 10 2 x 10 1 x 15 1 x 10   TA with march 1 x 15 -- 1 x 15 1 x 10 1 x 10  1 x 10      SLR 1 x 10 1 x 10 -- -- -- -- 2 x 10 1 x 15 1 x 10   Hip abduction -- --- ---  -- -- 2 x 10 1 x 15 1 x 10   clamshell 3 x 10 RTB 3 x 10 RTB 3 x 10 YTB 2 x 10 YTB 2 x 10 YTB 2 x 10 YTB 2 x 10 1 x 15 1 x 10   Hip adduction 30 x 3" w/ball 30 x 3" w/ball 30 x 3" w/ ball 30 x 3" w/ ball 30 x 3" w/ball 30 x 3" w/ball 15 x 3" w/ ball 10 x 3" w/ ball --   SAQ 2 x 10 x 1# 2 x 10 x 1# --  -- -- 2 x 10 1 x 10 --                               SEATED:             LAQ 2 x 10 x 2.5# 2 x 10 x 1# 1 x 15 1 x 15 1 x 15  -- 1 x 15 1 x 10 --   Hamstring curl -- 1 x 10 RTB -- -- -- -- Not today 1 x 10 RTB --   Hip flexion 1 x 15 x 1# 1 x 15 Not today 1 x 15 1 x 15 -- 2 x 10 1 x 15 --   STANDING:             Step up -- L1 1 x 15 Not today Not today L1 1 x 10   -- -- --   Mini-squat -- 1 x 10 Not today Not today 1 x 10   -- --- --   Single leg stance -- next Next? Next? Next   -- -- --                                                           Initials CINDI GILBERT MA     Pt receives manual therapy x 10 minutes:    - STM to piriformis, gluteus medius, minimus, jax, IT band, quads, and hamstrings  - patella mobilizations and A/P mobilizations to left knee  - MET to correct posterior rotation of left ilium    Home Exercises Provided and Patient Education Provided     Education provided:   - compliance with HEP    Written Home Exercises Provided: Patient instructed to cont prior HEP.  Exercises were reviewed and Jayne was able to demonstrate them prior to the end of the session.  Jayne demonstrated good  understanding of the education provided.     See EMR under Patient Instructions for exercises provided prior visit.    Assessment     Jayne has " posterior rotation of left ilium and receives MET as well as STM to decrease pain and restore normal alignment.  She performs adjusted exercise routine due to pain during weight bearing and bridging exercises.  She will continue with manual therapy and strengthening exercises to decrease pain, improve stability, and return to prior level of function.    Jayne is progressing well towards her goals.   Pt prognosis is Good.     Pt will continue to benefit from skilled outpatient physical therapy to address the deficits listed in the problem list box on initial evaluation, provide pt/family education and to maximize pt's level of independence in the home and community environment.     Pt's spiritual, cultural and educational needs considered and pt agreeable to plan of care and goals.     Anticipated barriers to physical therapy: none    Goals:   Short Term Goals:  4 weeks  1. Patient will be compliant with HEP to promote the independent management of current diagnosis. In progress, not met  2. Patient will increase left knee flexion to 120 degrees to improve transferring in and out of vehicle. In progress, not met  3. Patient will improve TUG time from 25 seconds to 17 seconds. In progress, not met  4. Patient will report a decrease in complaints of left knee pain to 5/10 during ADLs. In progress, not met       Long Term Goals:  8 weeks  1. Patient will increase strength of left knee to 4+/5 to improve transfers from low lying surfaces. In progress, not met  2. Patient will increase strength of left hip musculature to 4+/5 to improve stability while walking over uneven surfaces. In progress, not met  3. Patient will report a decrease in complaints of left knee pain to 3/10 during ADLs. In progress, not met  4. Patient will improve FOTO limitation status from 72% to 55% placing the patient in the 40-60% impaired, limited, or restricted category indicating increased functional mobility. In progress, not met    Plan      Continue with the plan of care. progress SAQ and return to bridging next visit.     Alejandro Bennett, PT, DPT

## 2020-07-15 ENCOUNTER — CLINICAL SUPPORT (OUTPATIENT)
Dept: REHABILITATION | Facility: HOSPITAL | Age: 80
End: 2020-07-15
Payer: MEDICARE

## 2020-07-15 DIAGNOSIS — M79.605 PAIN OF LEFT LOWER EXTREMITY: ICD-10-CM

## 2020-07-15 DIAGNOSIS — R29.898 WEAKNESS OF LEFT LOWER EXTREMITY: ICD-10-CM

## 2020-07-15 PROCEDURE — 97110 THERAPEUTIC EXERCISES: CPT | Mod: PO

## 2020-07-15 NOTE — PROGRESS NOTES
"  Physical Therapy Treatment Note     Name: Jayne Rivera [place]  Clinic Number: [id number]    Therapy Diagnosis:   Encounter Diagnoses   Name Primary?    Pain of left lower extremity     Weakness of left lower extremity      Physician:Mehrdad Velazquez MD  Visit Date: 7/15/2020    Physician Orders: PT Eval and Treat   Medical Diagnosis from Referral:   M79.606 (ICD-10-CM) - Lower extremity pain   R29.898 (ICD-10-CM) - Lower extremity weakness   Evaluation Date: 5/27/2020  Authorization Period Expiration: 12/03/2020  Plan of Care Expiration: 7/27/2020  Visit # / Visits authorized: 9/ 20    Time In: 3:30 pm  Time Out: 4:12 pm  Total Billable Time: 39 minutes    Precautions: Standard    Subjective     Pt reports: MD gave her a brace but she is not finding that it helps  She was compliant with home exercise program.  Response to previous treatment: increase in soreness  Functional change: none    Pain: 8/10  Location: left back, knee  and hip      Objective     Jayne received therapeutic exercises to develop strength, flexibility and core stabilization for 39 minutes including:     Date  7/15/2020 7/1/2020 6/26/2020 6/24/2020 6/19/2020 6/17/2020 6/10/2020 6/5/2020 6/3/2020 5/27/2020   VISIT 9/20 8/20 7/20 6/20 5/20 4/20 3/20 2/20 1/20 1/1   POC EXP. DATE 7/27/2020 7/27/2020 7/27/2020 7/27/2020 7/27/2020 7/27/2020 7/27/2020 7/27/2020 7/27/2020 7/27/2020   VISIT AMOUNT  MEDICARE TOTAL 90.96  865.42 88.10  774.46 90.96  686.36 90.96  595.40 90.96  504.50 60.64  413.54   88.10  352.90 90.96  264.80 60.64  173.84 113.20   FACE-TO-FACE 7/26/2020 7/26/2020 7/26/2020 6/27/2020 6/27/2020 6/27/2020 6/27/2020 6/27/2020 6/27/2020 6/27/2020   FOTO    -- -- 5/5 4/5 3/5 2/5 1/5   Nu step L2 x 5' L2 x 5' L2 x 3' Not today L1 x 3'        bike     -- -- L1 x 5' L1 x 5'       TABLE:              Hamstring stretch 5 x 10" 5 x 10" 5 x 10" 5 x 10" 5 x 10" 5 x 10" 5 x 10" 5 x 10" 5 x 10" 3 x 10"   IT band stretch  5 x 10" 5 x 10" 5 x " "10" 5 x 10" 5 x 10" 5 x 10" 5 x 10" 5 x 10" 3 x 10"   Piriformis stretch 5 x 10" 5 x 10" 5 x 10" 5 x 10" 5 x 10" 5 x 10" 5 x 10"      Pelvic tilt 15 x 3" 15 x 3" 15 x 3" 15 x 3" 15 x 3" 15 x 3" 15 x 3" -- -- --   bridging -- -- 2 x 10 2 x 10 2 x 10 2 x 10  2 x 10 2 x 10 1 x 15 1 x 10   TA with march 1 x 15 1 x 15 -- 1 x 15 1 x 10 1 x 10  1 x 10      SLR 1 x 10 1 x 10 1 x 10 -- -- -- -- 2 x 10 1 x 15 1 x 10   Hip abduction -- -- --- ---  -- -- 2 x 10 1 x 15 1 x 10   clamshell 3 x 10 RTB 3 x 10 RTB 3 x 10 RTB 3 x 10 YTB 2 x 10 YTB 2 x 10 YTB 2 x 10 YTB 2 x 10 1 x 15 1 x 10   Hip adduction 30 x 3" w/ ball 30 x 3" w/ball 30 x 3" w/ball 30 x 3" w/ ball 30 x 3" w/ ball 30 x 3" w/ball 30 x 3" w/ball 15 x 3" w/ ball 10 x 3" w/ ball --   SAQ 2 x 10 x 1# 2 x 10 x 1# 2 x 10 x 1# --  -- -- 2 x 10 1 x 10 --                                 SEATED:              LAQ 2 x 10 x 2.5 # 2 x 10 x 2.5# 2 x 10 x 1# 1 x 15 1 x 15 1 x 15  -- 1 x 15 1 x 10 --   Hamstring curl -- -- 1 x 10 RTB -- -- -- -- Not today 1 x 10 RTB --   Hip flexion 1 x 15 x 1# 1 x 15 x 1# 1 x 15 Not today 1 x 15 1 x 15 -- 2 x 10 1 x 15 --   STANDING:              Step up -- -- L1 1 x 15 Not today Not today L1 1 x 10   -- -- --   Mini-squat -- -- 1 x 10 Not today Not today 1 x 10   -- --- --   Single leg stance -- -- next Next? Next? Next   -- -- --                                                               Initials OFELIA GILBERT BJ CINDI GILBERT MA     Pt receives manual therapy x 0 minutes:    - STM to piriformis, gluteus medius, minimus, jax, IT band, quads, and hamstrings  - patella mobilizations and A/P mobilizations to left knee  - MET to correct posterior rotation of left ilium    Home Exercises Provided and Patient Education Provided     Education provided:   - compliance with HEP    Written Home Exercises Provided: Patient instructed to cont prior HEP.  Exercises were reviewed and Jayne was able to demonstrate them prior to the end of the session.  Jayne " demonstrated good  understanding of the education provided.     See EMR under Patient Instructions for exercises provided prior visit.    Assessment     Jayne was able to perform all of today's activities with no increase in symptoms prior to leaving the clinic. She required occasional verbal cues to maintain her core engagement with seated exercises. She continues to demonstrate hip weakness with clamshell exercise, especially with L LE as she requires verbal cues to move through her full ROM during the 3rd set of 10 reps.   Jayne is progressing well towards her goals.   Pt prognosis is Good.     Pt will continue to benefit from skilled outpatient physical therapy to address the deficits listed in the problem list box on initial evaluation, provide pt/family education and to maximize pt's level of independence in the home and community environment.     Pt's spiritual, cultural and educational needs considered and pt agreeable to plan of care and goals.     Anticipated barriers to physical therapy: none    Goals:   Short Term Goals:  4 weeks  1. Patient will be compliant with HEP to promote the independent management of current diagnosis. In progress, not met  2. Patient will increase left knee flexion to 120 degrees to improve transferring in and out of vehicle. In progress, not met  3. Patient will improve TUG time from 25 seconds to 17 seconds. In progress, not met  4. Patient will report a decrease in complaints of left knee pain to 5/10 during ADLs. In progress, not met       Long Term Goals:  8 weeks  1. Patient will increase strength of left knee to 4+/5 to improve transfers from low lying surfaces. In progress, not met  2. Patient will increase strength of left hip musculature to 4+/5 to improve stability while walking over uneven surfaces. In progress, not met  3. Patient will report a decrease in complaints of left knee pain to 3/10 during ADLs. In progress, not met  4. Patient will improve FOTO  limitation status from 72% to 55% placing the patient in the 40-60% impaired, limited, or restricted category indicating increased functional mobility. In progress, not met    Plan     Continue with the plan of care. Attempt to resume bridging and increase resistance with mat exercises next visit.     Valeria Curtis, PT, DPT

## 2020-07-17 ENCOUNTER — CLINICAL SUPPORT (OUTPATIENT)
Dept: REHABILITATION | Facility: HOSPITAL | Age: 80
End: 2020-07-17
Payer: MEDICARE

## 2020-07-17 DIAGNOSIS — R29.898 WEAKNESS OF LEFT LOWER EXTREMITY: ICD-10-CM

## 2020-07-17 DIAGNOSIS — M79.605 PAIN OF LEFT LOWER EXTREMITY: ICD-10-CM

## 2020-07-17 PROCEDURE — 97110 THERAPEUTIC EXERCISES: CPT | Mod: PO

## 2020-07-17 NOTE — PROGRESS NOTES
Physical Therapy Treatment Note     Name: Jayne Rivera [place]  Clinic Number: [id number]    Therapy Diagnosis:   Encounter Diagnoses   Name Primary?    Pain of left lower extremity     Weakness of left lower extremity      Physician:Mehrdad Velazquez MD  Visit Date: 7/17/2020    Physician Orders: PT Eval and Treat   Medical Diagnosis from Referral:   M79.606 (ICD-10-CM) - Lower extremity pain   R29.898 (ICD-10-CM) - Lower extremity weakness   Evaluation Date: 5/27/2020  Authorization Period Expiration: 12/03/2020  Plan of Care Expiration: 7/27/2020  Visit # / Visits authorized: 10/ 20    Time In: 8:45 am  Time Out: 9:30 am  Total Billable Time: 39 minutes    Precautions: Standard    Subjective     Pt reports: her pain is worse in mornings when she first wakes as well as increased pain during prolonged weight bearing.  She was compliant with home exercise program.  Response to previous treatment: increase in soreness  Functional change: none    Pain: 7/10  Location: left back, knee  and hip      Objective     Jayne received therapeutic exercises to develop strength, flexibility and core stabilization for 40 minutes including:     Date  7/17/2020 7/15/2020 7/1/2020 6/26/2020 6/24/2020 6/19/2020 6/17/2020 6/10/2020 6/5/2020 6/3/2020 5/27/2020   VISIT 10/20 9/20 8/20 7/20 6/20 5/20 4/20 3/20 2/20 1/20 1/1   POC EXP. DATE 7/27/2020 7/27/2020 7/27/2020 7/27/2020 7/27/2020 7/27/2020 7/27/2020 7/27/2020 7/27/2020 7/27/2020 7/27/2020   VISIT AMOUNT  MEDICARE TOTAL 90.96  956.38 90.96  865.42 88.10  774.46 90.96  686.36 90.96  595.40 90.96  504.50 60.64  413.54   88.10  352.90 90.96  264.80 60.64  173.84 113.20   FACE-TO-FACE 7/26/2020 7/26/2020 7/26/2020 7/26/2020 6/27/2020 6/27/2020 6/27/2020 6/27/2020 6/27/2020 6/27/2020 6/27/2020   FOTO     -- -- 5/5 4/5 3/5 2/5 1/5   Nu step L2 x 5' L2 x 5' L2 x 5' L2 x 3' Not today L1 x 3'        bike      -- -- L1 x 5' L1 x 5'       TABLE:               Hamstring stretch 5 x  "10" 5 x 10" 5 x 10" 5 x 10" 5 x 10" 5 x 10" 5 x 10" 5 x 10" 5 x 10" 5 x 10" 3 x 10"   IT band stretch 5 x 10"  5 x 10" 5 x 10" 5 x 10" 5 x 10" 5 x 10" 5 x 10" 5 x 10" 5 x 10" 3 x 10"   Piriformis stretch 5 x 10" 5 x 10" 5 x 10" 5 x 10" 5 x 10" 5 x 10" 5 x 10" 5 x 10"      Pelvic tilt 20 x 3" 15 x 3" 15 x 3" 15 x 3" 15 x 3" 15 x 3" 15 x 3" 15 x 3" -- -- --   bridging 2 x 10 -- -- 2 x 10 2 x 10 2 x 10 2 x 10  2 x 10 2 x 10 1 x 15 1 x 10   TA with march 2 x 10 1 x 15 1 x 15 -- 1 x 15 1 x 10 1 x 10  1 x 10      SLR 1 x 15 1 x 10 1 x 10 1 x 10 -- -- -- -- 2 x 10 1 x 15 1 x 10   clamshell 3 x 10 GTB 3 x 10 RTB 3 x 10 RTB 3 x 10 RTB 3 x 10 YTB 2 x 10 YTB 2 x 10 YTB 2 x 10 YTB 2 x 10 1 x 15 1 x 10   Hip adduction 30 x 3" w/ball 30 x 3" w/ ball 30 x 3" w/ball 30 x 3" w/ball 30 x 3" w/ ball 30 x 3" w/ ball 30 x 3" w/ball 30 x 3" w/ball 15 x 3" w/ ball 10 x 3" w/ ball --   SAQ 2 x 10 x 2.5# 2 x 10 x 1# 2 x 10 x 1# 2 x 10 x 1# --  -- -- 2 x 10 1 x 10 --                                   SEATED:               LAQ 3 x 10 x 2.5# 2 x 10 x 2.5 # 2 x 10 x 2.5# 2 x 10 x 1# 1 x 15 1 x 15 1 x 15  -- 1 x 15 1 x 10 --   Hamstring curl 1 x 15 GTB -- -- 1 x 10 RTB -- -- -- -- Not today 1 x 10 RTB --   Hip flexion 1 x 15 x 2.5# 1 x 15 x 1# 1 x 15 x 1# 1 x 15 Not today 1 x 15 1 x 15 -- 2 x 10 1 x 15 --   STANDING:               Step up -- -- -- L1 1 x 15 Not today Not today L1 1 x 10   -- -- --   Mini-squat -- -- -- 1 x 10 Not today Not today 1 x 10   -- --- --   Single leg stance -- -- -- next Next? Next? Next   -- -- --                                                                   Initials CINDI GILBERT MA     Pt receives manual therapy x 0 minutes:    - STM to piriformis, gluteus medius, minimus, jax, IT band, quads, and hamstrings  - patella mobilizations and A/P mobilizations to left knee  - MET to correct posterior rotation of left ilium    Home Exercises Provided and Patient Education Provided     Education " provided:   - compliance with HEP    Written Home Exercises Provided: Patient instructed to cont prior HEP.  Exercises were reviewed and Jayne was able to demonstrate them prior to the end of the session.  Jayne demonstrated good  understanding of the education provided.     See EMR under Patient Instructions for exercises provided prior visit.    Assessment     Jayne presents to therapy with level pelvis but reporting radicular symptoms in left buttock region.  She is able to perform today's exercises without increase in pain during or following treatment.  Pt reports compliance with HEP, but continued daily pain with pain the greatest in morning.     Jayne is progressing well towards her goals.   Pt prognosis is Good.     Pt will continue to benefit from skilled outpatient physical therapy to address the deficits listed in the problem list box on initial evaluation, provide pt/family education and to maximize pt's level of independence in the home and community environment.     Pt's spiritual, cultural and educational needs considered and pt agreeable to plan of care and goals.     Anticipated barriers to physical therapy: none    Goals:   Short Term Goals:  4 weeks  1. Patient will be compliant with HEP to promote the independent management of current diagnosis. In progress, not met  2. Patient will increase left knee flexion to 120 degrees to improve transferring in and out of vehicle. In progress, not met  3. Patient will improve TUG time from 25 seconds to 17 seconds. In progress, not met  4. Patient will report a decrease in complaints of left knee pain to 5/10 during ADLs. In progress, not met       Long Term Goals:  8 weeks  1. Patient will increase strength of left knee to 4+/5 to improve transfers from low lying surfaces. In progress, not met  2. Patient will increase strength of left hip musculature to 4+/5 to improve stability while walking over uneven surfaces. In progress, not met  3. Patient will  report a decrease in complaints of left knee pain to 3/10 during ADLs. In progress, not met  4. Patient will improve FOTO limitation status from 72% to 55% placing the patient in the 40-60% impaired, limited, or restricted category indicating increased functional mobility. In progress, not met    Plan     Continue with the plan of care. Attempt standing exercises next visit.     Alejandro Bennett, PT, DPT

## 2020-07-22 ENCOUNTER — CLINICAL SUPPORT (OUTPATIENT)
Dept: REHABILITATION | Facility: HOSPITAL | Age: 80
End: 2020-07-22
Payer: MEDICARE

## 2020-07-22 ENCOUNTER — DOCUMENTATION ONLY (OUTPATIENT)
Dept: REHABILITATION | Facility: HOSPITAL | Age: 80
End: 2020-07-22

## 2020-07-22 DIAGNOSIS — M79.605 PAIN OF LEFT LOWER EXTREMITY: Primary | ICD-10-CM

## 2020-07-22 DIAGNOSIS — R29.898 WEAKNESS OF LEFT LOWER EXTREMITY: ICD-10-CM

## 2020-07-22 PROCEDURE — 97110 THERAPEUTIC EXERCISES: CPT | Mod: PO,CQ

## 2020-07-22 NOTE — PROGRESS NOTES
Physical Therapy Treatment Note     Name: Jayne Rivera [place]  Clinic Number: [id number]    Therapy Diagnosis:   Encounter Diagnoses   Name Primary?    Pain of left lower extremity Yes    Weakness of left lower extremity      Physician:Mehrdad Velazquez MD  Visit Date: 7/22/2020    Physician Orders: PT Eval and Treat   Medical Diagnosis from Referral:   M79.606 (ICD-10-CM) - Lower extremity pain   R29.898 (ICD-10-CM) - Lower extremity weakness   Evaluation Date: 5/27/2020  Authorization Period Expiration: 12/03/2020  Plan of Care Expiration: 7/27/2020  Visit # / Visits authorized: 11 / 20    Time In: 11:18 am  Time Out: 12:00 pm  Total Billable Time: 43 minutes    Precautions: Standard    Subjective     Pt reports: her pain is worse in mornings when she first wakes as well as increased pain during prolonged weight bearing.  She was compliant with home exercise program.  Response to previous treatment: increase in soreness  Functional change: none    Pain: 9/10  Location: left back, knee  and hip      Objective     Jayne received therapeutic exercises to develop strength, flexibility and core stabilization for 43 minutes including:     Date  7/22/2020 7/17/2020 7/15/2020 7/1/2020 6/26/2020 6/24/2020 6/19/2020 6/17/2020 6/10/2020 6/5/2020 6/3/2020 5/27/2020   VISIT 11/20 10/20 9/20 8/20 7/20 6/20 5/20 4/20 3/20 2/20 1/20 1/1   POC EXP. DATE 7/27/2020 7/27/2020 7/27/2020 7/27/2020 7/27/2020 7/27/2020 7/27/2020 7/27/2020 7/27/2020 7/27/2020 7/27/2020 7/27/2020   VISIT AMOUNT  MEDICARE TOTAL 90.96  1047.34 90.96  956.38 90.96  865.42 88.10  774.46 90.96  686.36 90.96  595.40 90.96  504.50 60.64  413.54 88.10  352.90 90.96  264.80 60.64  173.84 113.20   FACE-TO-FACE 8/26/2020 7/26/2020 7/26/2020 7/26/2020 7/26/2020 6/27/2020 6/27/2020 6/27/2020 6/27/2020 6/27/2020 6/27/2020 6/27/2020   FOTO      -- -- 5/5 4/5 3/5 2/5 1/5                  Nu step L1 x 5' L2 x 5' L2 x 5' L2 x 5' L2 x 3' Not today L1 x 3'       "  Bike       -- -- L1 x 5' L1 x 5'       TABLE:                Hamstring stretch 5 x 15" 5 x 10" 5 x 10" 5 x 10" 5 x 10" 5 x 10" 5 x 10" 5 x 10" 5 x 10" 5 x 10" 5 x 10" 3 x 10"   IT band stretch 5 x 15" (L) 5 x 10"  5 x 10" 5 x 10" 5 x 10" 5 x 10" 5 x 10" 5 x 10" 5 x 10" 5 x 10" 3 x 10"   Piriformis stretch 5 x 15" 5 x 10" 5 x 10" 5 x 10" 5 x 10" 5 x 10" 5 x 10" 5 x 10" 5 x 10"      Pelvic tilt 20 x 3" 20 x 3" 15 x 3" 15 x 3" 15 x 3" 15 x 3" 15 x 3" 15 x 3" 15 x 3" -- -- --   Bridging 2 x 10 2 x 10 -- -- 2 x 10 2 x 10 2 x 10 2 x 10  2 x 10 2 x 10 1 x 15 1 x 10   TA with march 2 x 10 2 x 10 1 x 15 1 x 15 -- 1 x 15 1 x 10 1 x 10  1 x 10      SLR 2 x 10 1 x 15 1 x 10 1 x 10 1 x 10 -- -- -- -- 2 x 10 1 x 15 1 x 10   SL Clamshell 3 x 10 GTB 3 x 10 GTB 3 x 10 RTB 3 x 10 RTB 3 x 10 RTB 3 x 10 YTB 2 x 10 YTB 2 x 10 YTB 2 x 10 YTB 2 x 10 1 x 15 1 x 10   Hip adduction 30 x 5" Ball 30 x 3" w/ball 30 x 3" w/ ball 30 x 3" w/ball 30 x 3" w/ball 30 x 3" w/ ball 30 x 3" w/ ball 30 x 3" w/ball 30 x 3" w/ball 15 x 3" w/ ball 10 x 3" w/ ball --   SAQ 3 x 10 x 2.5# 2 x 10 x 2.5# 2 x 10 x 1# 2 x 10 x 1# 2 x 10 x 1# --  -- -- 2 x 10 1 x 10 --   LTR 1 x 15                                SEATED:                LAQ NT 3 x 10 x 2.5# 2 x 10 x 2.5 # 2 x 10 x 2.5# 2 x 10 x 1# 1 x 15 1 x 15 1 x 15  -- 1 x 15 1 x 10 --   Hamstring curl NT 1 x 15 GTB -- -- 1 x 10 RTB -- -- -- -- Not today 1 x 10 RTB --   Hip flexion NT 1 x 15 x 2.5# 1 x 15 x 1# 1 x 15 x 1# 1 x 15 Not today 1 x 15 1 x 15 -- 2 x 10 1 x 15 --                  STANDING:                Step up NT -- -- -- L1 1 x 15 Not today Not today L1 1 x 10   -- -- --   Mini-squat NT -- -- -- 1 x 10 Not today Not today 1 x 10   -- --- --   Single leg stance NT -- -- -- next Next? Next? Next   -- -- --                                                                       Initials JEROME GILBERT MA     Pt receives manual therapy x 0 minutes:    - STM to piriformis, gluteus medius, " minimus, jax, IT band, quads, and hamstrings  - patella mobilizations and A/P mobilizations to left knee  - MET to correct posterior rotation of left ilium    Home Exercises Provided and Patient Education Provided     Education provided:   - compliance with HEP    Written Home Exercises Provided: Patient instructed to cont prior HEP.  Exercises were reviewed and Jayne was able to demonstrate them prior to the end of the session.  Jayne demonstrated good  understanding of the education provided.     See EMR under Patient Instructions for exercises provided prior visit.    Assessment     Jayne presents to therapy with moderate antalgic gait pattern and level pelvis but reporting radicular symptoms in left buttock region to lower leg. Pt was able to complete most of routine without any increase in symptoms prior to leaving clinic. Pt was unable to complete standing exercises today due to increased pain levels, although will attempt first next visit if pain levels improve.   Jayne is progressing slowly towards her goals.   Pt prognosis is Good.     Pt will continue to benefit from skilled outpatient physical therapy to address the deficits listed in the problem list box on initial evaluation, provide pt/family education and to maximize pt's level of independence in the home and community environment.     Pt's spiritual, cultural and educational needs considered and pt agreeable to plan of care and goals.     Anticipated barriers to physical therapy: none    Goals:   Short Term Goals:  4 weeks  1. Patient will be compliant with HEP to promote the independent management of current diagnosis. In progress, not met  2. Patient will increase left knee flexion to 120 degrees to improve transferring in and out of vehicle. In progress, not met  3. Patient will improve TUG time from 25 seconds to 17 seconds. In progress, not met  4. Patient will report a decrease in complaints of left knee pain to 5/10 during ADLs. In  progress, not met       Long Term Goals:  8 weeks  1. Patient will increase strength of left knee to 4+/5 to improve transfers from low lying surfaces. In progress, not met  2. Patient will increase strength of left hip musculature to 4+/5 to improve stability while walking over uneven surfaces. In progress, not met  3. Patient will report a decrease in complaints of left knee pain to 3/10 during ADLs. In progress, not met  4. Patient will improve FOTO limitation status from 72% to 55% placing the patient in the 40-60% impaired, limited, or restricted category indicating increased functional mobility. In progress, not met    Plan     Continue with the plan of care. Attempt standing exercises next visit if pain levels allow.     Eunice Rojas, PTA 1/6

## 2020-07-22 NOTE — PROGRESS NOTES
Face to Face PTA Conference performed with Alejandro Bennett PT regarding patient's current status, overall progress, and plan of care. Pt will be seen by a physical therapist minimally every 6th visit or every 30 days.    Eunice Rojas PTA  7/22/2020    Face to Face PTA Conference performed with Eunice Rojas PTA regarding patient's current status, overall progress, and plan of care. Pt will be seen by a physical therapist minimally every 6th visit or every 30 days.     Immediate family member

## 2020-07-24 ENCOUNTER — CLINICAL SUPPORT (OUTPATIENT)
Dept: REHABILITATION | Facility: HOSPITAL | Age: 80
End: 2020-07-24
Payer: MEDICARE

## 2020-07-24 DIAGNOSIS — M79.605 PAIN OF LEFT LOWER EXTREMITY: ICD-10-CM

## 2020-07-24 DIAGNOSIS — R29.898 WEAKNESS OF LEFT LOWER EXTREMITY: ICD-10-CM

## 2020-07-24 PROCEDURE — 97164 PT RE-EVAL EST PLAN CARE: CPT | Mod: PO

## 2020-07-24 PROCEDURE — 97110 THERAPEUTIC EXERCISES: CPT | Mod: PO

## 2020-07-24 NOTE — PLAN OF CARE
Outpatient Therapy Discharge Summary     Name: Jayne You  Clinic Number: [id number]    Therapy Diagnosis:   Encounter Diagnoses   Name Primary?    Pain of left lower extremity     Weakness of left lower extremity      Physician: Referring, Unknown    Physician Orders: PT Eval and Treat   Medical Diagnosis from Referral:   M79.606 (ICD-10-CM) - Lower extremity pain   R29.898 (ICD-10-CM) - Lower extremity weakness   Evaluation Date: 5/27/2020      Date of Last visit: 7/24/2020  Total Visits Received: 12  Cancelled Visits: 2  No Show Visits: 0    Assessment    Goals:   Short Term Goals:  4 weeks  1. Patient will be compliant with HEP to promote the independent management of current diagnosis. met  2. Patient will increase left knee flexion to 120 degrees to improve transferring in and out of vehicle. met  3. Patient will improve TUG time from 25 seconds to 17 seconds. not met  4. Patient will report a decrease in complaints of left knee pain to 5/10 during ADLs. not met        Long Term Goals:  8 weeks  1. Patient will increase strength of left knee to 4+/5 to improve transfers from low lying surfaces. not met  2. Patient will increase strength of left hip musculature to 4+/5 to improve stability while walking over uneven surfaces. not met  3. Patient will report a decrease in complaints of left knee pain to 3/10 during ADLs.  not met  4. Patient will improve FOTO limitation status from 72% to 55% placing the patient in the 40-60% impaired, limited, or restricted category indicating increased functional mobility. not met    Discharge reason: Patient has reached the maximum rehab potential for the present time    Plan   This patient is discharged from Physical Therapy

## 2020-07-24 NOTE — PROGRESS NOTES
Physical Therapy Treatment Note     Name: Jayne Rivera [place]  Clinic Number: [id number]    Therapy Diagnosis:   Encounter Diagnoses   Name Primary?    Pain of left lower extremity     Weakness of left lower extremity      Physician:Mehrdad Velazquez MD  Visit Date: 7/24/2020    Physician Orders: PT Eval and Treat   Medical Diagnosis from Referral:   M79.606 (ICD-10-CM) - Lower extremity pain   R29.898 (ICD-10-CM) - Lower extremity weakness   Evaluation Date: 5/27/2020  Authorization Period Expiration: 12/03/2020  Plan of Care Expiration: 7/27/2020  Visit # / Visits authorized: 12 / 20    Time In: 8:45 am  Time Out: 9:30 pm  Total Billable Time: 43 minutes    Precautions: Standard    Subjective     Pt reports: she had steroid injection Wednesday without relief and reports no significant change in symptoms with therapy.  She reports no relief with back brace, pain medications, and is scheduled for MRI this coming Wednesday morning.   She was compliant with home exercise program.  Response to previous treatment: increase in soreness  Functional change: none    Pain: 9/10  Location: left back, knee  and hip      Objective     Jayne received therapeutic exercises to develop strength, flexibility and core stabilization for 10 minutes including:     Date  7/24/2020 7/22/2020 7/17/2020 7/15/2020 7/1/2020 6/26/2020 6/24/2020 6/19/2020 6/17/2020 6/10/2020 6/5/2020 6/3/2020 5/27/2020   VISIT 12/20 11/20 10/20 9/20 8/20 7/20 6/20 5/20 4/20 3/20 2/20 1/20 1/1   POC EXP. DATE 7/27/2020 7/27/2020 7/27/2020 7/27/2020 7/27/2020 7/27/2020 7/27/2020 7/27/2020 7/27/2020 7/27/2020 7/27/2020 7/27/2020 7/27/2020   VISIT AMOUNT  MEDICARE TOTAL 86.3  1133.64 90.96  1047.34 90.96  956.38 90.96  865.42 88.10  774.46 90.96  686.36 90.96  595.40 90.96  504.50 60.64  413.54 88.10  352.90 90.96  264.80 60.64  173.84 113.20   FACE-TO-FACE  8/26/2020 7/26/2020 7/26/2020 7/26/2020 7/26/2020 6/27/2020 6/27/2020 6/27/2020 6/27/2020 6/27/2020  "6/27/2020 6/27/2020   FOTO       -- -- 5/5 4/5 3/5 2/5 1/5                   Nu step L1 x 5' L1 x 5' L2 x 5' L2 x 5' L2 x 5' L2 x 3' Not today L1 x 3'        Bike        -- -- L1 x 5' L1 x 5'       TABLE:                 Hamstring stretch  5 x 15" 5 x 10" 5 x 10" 5 x 10" 5 x 10" 5 x 10" 5 x 10" 5 x 10" 5 x 10" 5 x 10" 5 x 10" 3 x 10"   IT band stretch  5 x 15" (L) 5 x 10"  5 x 10" 5 x 10" 5 x 10" 5 x 10" 5 x 10" 5 x 10" 5 x 10" 5 x 10" 3 x 10"   Piriformis stretch  5 x 15" 5 x 10" 5 x 10" 5 x 10" 5 x 10" 5 x 10" 5 x 10" 5 x 10" 5 x 10"      Pelvic tilt 20 x 3" 20 x 3" 20 x 3" 15 x 3" 15 x 3" 15 x 3" 15 x 3" 15 x 3" 15 x 3" 15 x 3" -- -- --   Bridging 1 x 10 2 x 10 2 x 10 -- -- 2 x 10 2 x 10 2 x 10 2 x 10  2 x 10 2 x 10 1 x 15 1 x 10   TA with march -- 2 x 10 2 x 10 1 x 15 1 x 15 -- 1 x 15 1 x 10 1 x 10  1 x 10      SLR 2 x 10 2 x 10 1 x 15 1 x 10 1 x 10 1 x 10 -- -- -- -- 2 x 10 1 x 15 1 x 10   SL Clamshell 3 x 10 GTB 3 x 10 GTB 3 x 10 GTB 3 x 10 RTB 3 x 10 RTB 3 x 10 RTB 3 x 10 YTB 2 x 10 YTB 2 x 10 YTB 2 x 10 YTB 2 x 10 1 x 15 1 x 10   Hip adduction 30 x 5" ball 30 x 5" Ball 30 x 3" w/ball 30 x 3" w/ ball 30 x 3" w/ball 30 x 3" w/ball 30 x 3" w/ ball 30 x 3" w/ ball 30 x 3" w/ball 30 x 3" w/ball 15 x 3" w/ ball 10 x 3" w/ ball --   SAQ  3 x 10 x 2.5# 2 x 10 x 2.5# 2 x 10 x 1# 2 x 10 x 1# 2 x 10 x 1# --  -- -- 2 x 10 1 x 10 --   LTR 1 x 15 1 x 15                                 SEATED:                 LAQ  NT 3 x 10 x 2.5# 2 x 10 x 2.5 # 2 x 10 x 2.5# 2 x 10 x 1# 1 x 15 1 x 15 1 x 15  -- 1 x 15 1 x 10 --   Hamstring curl  NT 1 x 15 GTB -- -- 1 x 10 RTB -- -- -- -- Not today 1 x 10 RTB --   Hip flexion  NT 1 x 15 x 2.5# 1 x 15 x 1# 1 x 15 x 1# 1 x 15 Not today 1 x 15 1 x 15 -- 2 x 10 1 x 15 --                   STANDING:                 Step up  NT -- -- -- L1 1 x 15 Not today Not today L1 1 x 10   -- -- --   Mini-squat  NT -- -- -- 1 x 10 Not today Not today 1 x 10   -- --- --   Single leg stance  NT -- -- -- next " Next? Next? Next   -- -- --                                                                           Initials MA SB MA DG MA MA DG DG BJ MA DG DG MA        Range of Motion:   Knee Left active Left Passive Right Active R passive   Flexion 120 124 125 130   Extension 0 NT 0  NT            Lower Extremity Strength  Right LE   Left LE     Knee extension: 4+/5 Knee extension: 4/5   Knee flexion: 5/5 Knee flexion: 4-/5   Hip flexion: 4+/5 Hip flexion: 4-/5   Hip extension:  4/5 Hip extension: 4-/5   Hip abduction: 4/5 Hip abduction: 3+/5   Hip adduction: 4+/5 Hip adduction 4/5   Ankle dorsiflexion: 5/5 Ankle dorsiflexion: 5/5   Ankle plantarflexion: 5/5 Ankle plantarflexion: 5/5            Function:     - SLS R: 5 seconds  - SLS L: 3 seconds  - Squat: unable  - Sit <--> Stand: uses UE for assistance  - TU seconds        Joint Mobility: hypomobile AP and patella mobility testing of left knee     Palpation: tenderness to palpation in gluteus medius, minimus, jxa, piriformis, IT band, quads, hamstrings     Sensation: intact to light touch     Flexibility:               Popliteal Angle: R = 28 degrees ; L = 32 degrees              Chandrakant's test: R = neg ; L = pos              Brant test: R = tightness in quads ; L = tightness in quads      Pt receives manual therapy x 0 minutes:    - STM to piriformis, gluteus medius, minimus, jax, IT band, quads, and hamstrings  - patella mobilizations and A/P mobilizations to left knee  - MET to correct posterior rotation of left ilium    Home Exercises Provided and Patient Education Provided     Education provided:   - compliance with HEP    Written Home Exercises Provided: Patient instructed to cont prior HEP.  Exercises were reviewed and Jayne was able to demonstrate them prior to the end of the session.  Jayne demonstrated good  understanding of the education provided.     See EMR under Patient Instructions for exercises provided prior visit.    Assessment     Jayne has  been compliant HEP and attending therapy but is reporting no significant change in her symptoms.  She is noted to have slight improvement in ROM, strength, and TUG time.  She met STG's #1 and 2 but unable to progress to all other goals with continued pain and weakness.  Pt will be discharged from PT at this time due to lack of progression and continue with HEP.  Jayne is progressing slowly towards her goals.   Pt prognosis is Good.     Pt will continue to benefit from skilled outpatient physical therapy to address the deficits listed in the problem list box on initial evaluation, provide pt/family education and to maximize pt's level of independence in the home and community environment.     Pt's spiritual, cultural and educational needs considered and pt agreeable to plan of care and goals.     Anticipated barriers to physical therapy: none    Goals:   Short Term Goals:  4 weeks  1. Patient will be compliant with HEP to promote the independent management of current diagnosis. met  2. Patient will increase left knee flexion to 120 degrees to improve transferring in and out of vehicle. met  3. Patient will improve TUG time from 25 seconds to 17 seconds. not met  4. Patient will report a decrease in complaints of left knee pain to 5/10 during ADLs. not met       Long Term Goals:  8 weeks  1. Patient will increase strength of left knee to 4+/5 to improve transfers from low lying surfaces. not met  2. Patient will increase strength of left hip musculature to 4+/5 to improve stability while walking over uneven surfaces. not met  3. Patient will report a decrease in complaints of left knee pain to 3/10 during ADLs.  not met  4. Patient will improve FOTO limitation status from 72% to 55% placing the patient in the 40-60% impaired, limited, or restricted category indicating increased functional mobility. not met    Plan     Discharge pt from PT to HEP.     Alejandro Bennett, PT

## 2020-10-26 NOTE — H&P
Subjective:     Severe pain left hip intolerable at this point admitted for left hip replacement.  Significant risks for surgery especially with her spine    Patient Active Problem List    Diagnosis Date Noted    Pain of left lower extremity 06/03/2020    Weakness of left lower extremity 06/03/2020     Past Medical History:   Diagnosis Date    Cardiomyopathy     Depression     Diabetes mellitus     Hypertension     Lipids abnormal     Lumbar herniated disc     Obesity     RBBB     Sleep apnea       Past Surgical History:   Procedure Laterality Date    APPENDECTOMY      BACK SURGERY  2014,2016,2017    BREAST SURGERY Bilateral     reduction    EYE SURGERY Bilateral     JOINT REPLACEMENT Right 2006    hip    TOTAL HIP ARTHROPLASTY Right 2006    TOTAL KNEE ARTHROPLASTY Left 7/18/2019    Procedure: ARTHROPLASTY, KNEE, TOTAL;  Surgeon: Isaiah Mccormick MD;  Location: [facility];  Service: Orthopedics;  Laterality: Left;      No medications prior to admission.     Review of patient's allergies indicates:   Allergen Reactions    Codeine Nausea And Vomiting     Pt reports can take percocet      Social History     Tobacco Use    Smoking status: Never Smoker    Smokeless tobacco: Never Used   Substance Use Topics    Alcohol use: No      No family history on file.   Review of Systems  Pertinent items are noted in HPI.    Objective:     No data found.  Heart regular lungs clear significant limp pain left groin region decreased rotation    Imaging Review  Loss of joint space sclerosis osteophyte formation    Assessment:     There are no hospital problems to display for this patient.      Plan:     The various methods of treatment have been discussed with the patient and family.   After consideration of risks, benefits and other options for treatment, the patient has consented to surgical interventions (significant risk discussed especially with her back as well as her body habitus traditional  approach).  Questions were answered and Pre-op teaching was done by me.

## 2020-10-27 ENCOUNTER — HOSPITAL ENCOUNTER (OUTPATIENT)
Dept: PREADMISSION TESTING | Facility: OTHER | Age: 80
Discharge: HOME | End: 2020-10-27
Attending: ORTHOPAEDIC SURGERY
Payer: MEDICARE

## 2020-10-27 ENCOUNTER — ANESTHESIA EVENT (OUTPATIENT)
Dept: SURGERY | Facility: OTHER | Age: 80
End: 2020-10-27
Payer: MEDICARE

## 2020-10-27 VITALS
WEIGHT: 229 LBS | HEIGHT: 64 IN | SYSTOLIC BLOOD PRESSURE: 132 MMHG | TEMPERATURE: 97 F | DIASTOLIC BLOOD PRESSURE: 78 MMHG | OXYGEN SATURATION: 98 % | BODY MASS INDEX: 39.09 KG/M2 | HEART RATE: 85 BPM | RESPIRATION RATE: 16 BRPM

## 2020-10-27 DIAGNOSIS — Z01.818 PREOP TESTING: ICD-10-CM

## 2020-10-27 LAB
ANION GAP SERPL CALC-SCNC: 10 MMOL/L (ref 8–16)
BASOPHILS # BLD AUTO: 0.06 K/UL (ref 0–0.2)
BASOPHILS NFR BLD: 0.9 % (ref 0–1.9)
BILIRUB UR QL STRIP: NEGATIVE
BUN SERPL-MCNC: 15 MG/DL (ref 8–23)
CALCIUM SERPL-MCNC: 9 MG/DL (ref 8.7–10.5)
CHLORIDE SERPL-SCNC: 103 MMOL/L (ref 95–110)
CLARITY UR: CLEAR
CO2 SERPL-SCNC: 29 MMOL/L (ref 23–29)
COLOR UR: YELLOW
CREAT SERPL-MCNC: 0.8 MG/DL (ref 0.5–1.4)
DIFFERENTIAL METHOD: ABNORMAL
EOSINOPHIL # BLD AUTO: 0.2 K/UL (ref 0–0.5)
EOSINOPHIL NFR BLD: 2.1 % (ref 0–8)
ERYTHROCYTE [DISTWIDTH] IN BLOOD BY AUTOMATED COUNT: 14.4 % (ref 11.5–14.5)
EST. GFR  (AFRICAN AMERICAN): >60 ML/MIN/1.73 M^2
EST. GFR  (NON AFRICAN AMERICAN): >60 ML/MIN/1.73 M^2
GLUCOSE SERPL-MCNC: 78 MG/DL (ref 70–110)
GLUCOSE UR QL STRIP: NEGATIVE
HCT VFR BLD AUTO: 45.5 % (ref 37–48.5)
HGB BLD-MCNC: 13.5 G/DL (ref 12–16)
HGB UR QL STRIP: NEGATIVE
IMM GRANULOCYTES # BLD AUTO: 0.02 K/UL (ref 0–0.04)
IMM GRANULOCYTES NFR BLD AUTO: 0.3 % (ref 0–0.5)
KETONES UR QL STRIP: ABNORMAL
LEUKOCYTE ESTERASE UR QL STRIP: NEGATIVE
LYMPHOCYTES # BLD AUTO: 2.3 K/UL (ref 1–4.8)
LYMPHOCYTES NFR BLD: 32.8 % (ref 18–48)
MCH RBC QN AUTO: 25.1 PG (ref 27–31)
MCHC RBC AUTO-ENTMCNC: 29.7 G/DL (ref 32–36)
MCV RBC AUTO: 85 FL (ref 82–98)
MONOCYTES # BLD AUTO: 0.6 K/UL (ref 0.3–1)
MONOCYTES NFR BLD: 8 % (ref 4–15)
NEUTROPHILS # BLD AUTO: 3.9 K/UL (ref 1.8–7.7)
NEUTROPHILS NFR BLD: 55.9 % (ref 38–73)
NITRITE UR QL STRIP: NEGATIVE
NRBC BLD-RTO: 0 /100 WBC
PH UR STRIP: 6 [PH] (ref 5–8)
PLATELET # BLD AUTO: 210 K/UL (ref 150–350)
PMV BLD AUTO: 12 FL (ref 9.2–12.9)
POTASSIUM SERPL-SCNC: 3.8 MMOL/L (ref 3.5–5.1)
PROT UR QL STRIP: NEGATIVE
RBC # BLD AUTO: 5.37 M/UL (ref 4–5.4)
SODIUM SERPL-SCNC: 142 MMOL/L (ref 136–145)
SP GR UR STRIP: 1.02 (ref 1–1.03)
URN SPEC COLLECT METH UR: ABNORMAL
UROBILINOGEN UR STRIP-ACNC: NEGATIVE EU/DL
WBC # BLD AUTO: 7.01 K/UL (ref 3.9–12.7)

## 2020-10-27 PROCEDURE — 81003 URINALYSIS AUTO W/O SCOPE: CPT

## 2020-10-27 PROCEDURE — 80048 BASIC METABOLIC PNL TOTAL CA: CPT

## 2020-10-27 PROCEDURE — 85025 COMPLETE CBC W/AUTO DIFF WBC: CPT

## 2020-10-27 PROCEDURE — 36415 COLL VENOUS BLD VENIPUNCTURE: CPT

## 2020-10-27 RX ORDER — BACLOFEN 10 MG/1
10 TABLET ORAL 2 TIMES DAILY
Status: ON HOLD | COMMUNITY
End: 2021-06-16

## 2020-10-27 RX ORDER — ZOLPIDEM TARTRATE 10 MG/1
5 TABLET ORAL NIGHTLY PRN
Status: ON HOLD | COMMUNITY
End: 2021-06-16

## 2020-10-27 RX ORDER — SODIUM CHLORIDE, SODIUM LACTATE, POTASSIUM CHLORIDE, CALCIUM CHLORIDE 600; 310; 30; 20 MG/100ML; MG/100ML; MG/100ML; MG/100ML
INJECTION, SOLUTION INTRAVENOUS CONTINUOUS
Status: CANCELLED | OUTPATIENT
Start: 2020-10-27

## 2020-10-27 RX ORDER — ACETAMINOPHEN 500 MG
1000 TABLET ORAL
Status: CANCELLED | OUTPATIENT
Start: 2020-10-27 | End: 2020-10-27

## 2020-10-27 RX ORDER — LIDOCAINE HYDROCHLORIDE 10 MG/ML
0.5 INJECTION, SOLUTION EPIDURAL; INFILTRATION; INTRACAUDAL; PERINEURAL ONCE
Status: CANCELLED | OUTPATIENT
Start: 2020-10-27 | End: 2020-10-27

## 2020-10-27 RX ORDER — DULOXETIN HYDROCHLORIDE 30 MG/1
30 CAPSULE, DELAYED RELEASE ORAL DAILY
Status: ON HOLD | COMMUNITY
End: 2021-06-16

## 2020-10-27 NOTE — DISCHARGE INSTRUCTIONS
Information to Prepare you for your Surgery    PRE-ADMIT TESTING -  [phone]    [address]  [place] [facility]          Your surgery has been scheduled at [facility]. We are pleased to have the opportunity to serve you. For Further Information please call [phone].    On the day of surgery please report to the Information Desk on the 1st floor.    · CONTACT YOUR PHYSICIAN'S OFFICE THE DAY PRIOR TO YOUR SURGERY TO OBTAIN YOUR ARRIVAL TIME.     · The evening before surgery do not eat anything after 9 p.m. ( this includes hard candy, chewing gum and mints).  You may only have GATORADE, POWERADE AND WATER  from 9 p.m. until you leave your home.   DO NOT DRINK ANY LIQUIDS ON THE WAY TO THE HOSPITAL.      SPECIAL MEDICATION INSTRUCTIONS: TAKE medications checked off by the Anesthesiologist on your Medication List.    Angiogram Patients: Take medications as instructed by your physician, including aspirin.     Surgery Patients:    If you take ASPIRIN - Your PHYSICIAN/SURGEON will need to inform you IF/OR when you need to stop taking aspirin prior to your surgery.     Do Not take any medications containing IBUPROFEN.  Do Not Wear any make-up or dark nail polish   (especially eye make-up) to surgery. If you come to surgery with makeup on you will be required to remove the makeup or nail polish.    Do not shave your surgical area at least 5 days prior to your surgery. The surgical prep will be performed at the hospital according to Infection Control regulations.    Leave all valuables at home.   Do Not wear any jewelry or watches, including any metal in body piercings. Jewelry must be removed prior to coming to the hospital.  There is a possibility that rings that are unable to be removed may be cut off if they are on the surgical extremity.    Contact Lens must be removed before surgery. Either do not wear the contact lens or bring a case and solution for  storage.  Please bring a container for eyeglasses or dentures as required.  Bring any paperwork your physician has provided, such as consent forms,  history and physicals, doctor's orders, etc.   Bring comfortable clothes that are loose fitting to wear upon discharge. Take into consideration the type of surgery being performed.  Maintain your diet as advised per your physician the day prior to surgery.      Adequate rest the night before surgery is advised.   Park in the Parking lot behind the hospital or in the [facility] across the street from the parking lot. Parking is complimentary.  If you will be discharged the same day as your procedure, please arrange for a responsible adult to drive you home or to accompany you if traveling by taxi.   YOU WILL NOT BE PERMITTED TO DRIVE OR TO LEAVE THE HOSPITAL ALONE AFTER SURGERY.   If you are being discharged the same day, it is strongly recommended that you arrange for someone to remain with you for the first 24 hrs following your surgery.    The Surgeon will speak to your family/visitor after your surgery regarding the outcome of your surgery and post op care.  The Surgeon may speak to you after your surgery, but there is a possibility you may not remember the details.  Please check with your family members regarding the conversation with the Surgeon.    We strongly recommend whoever is bringing you home be present for discharge instructions.  This will ensure a thorough understanding for your post op home care.    ALL CHILDREN MUST ALWAYS BE ACCOMPANIED BY AN ADULT.    Visitors-Refer to current Visitor policy handouts.    Thank you for your cooperation.  The Staff of [facility].                Bathing Instructions with Hibiclens     Shower the evening before and morning of your procedure with Hibiclens:   Wash your face with water and your regular face wash/soap   Apply Hibiclens directly on your skin or on a wet washcloth and wash  gently. When showering: Move away from the shower stream when applying Hibiclens to avoid rinsing off too soon.   Rinse thoroughly with warm water   Do not dilute Hibiclens         Dry off as usual, do not use any deodorant, powder, body lotions, perfume, after shave or cologne.

## 2020-10-27 NOTE — ANESTHESIA PREPROCEDURE EVALUATION
10/27/2020  Jayne You is a 80 y.o., female.    Anesthesia Evaluation    I have reviewed the Patient Summary Reports.    I have reviewed the Nursing Notes. I have reviewed the NPO Status.   I have reviewed the Medications.     Review of Systems  Anesthesia Hx:  History of prior surgery of interest to airway management or planning: Previous anesthesia: General 2006 Total Hip GA w Dr Mccormick with general anesthesia.  Denies Family Hx of Anesthesia complications.  Personal Hx of Anesthesia complications Slow To Awaken/Delayed Emergence Difficult or Failed Neuraxial Anesthesia (had uneventful placement of SAB, isobaric rop 3cc, had total spinal requiring pressor and ventaltory support)  Social:  Non-Smoker    Hematology/Oncology:  Hematology Normal   Oncology Normal     EENT/Dental:EENT/Dental Normal   Cardiovascular:   Hypertension, well controlled    Pulmonary:   Sleep Apnea, CPAP Recent sleep study, CPAP changed, awaiting new machine, urged to get preop   Renal/:  Renal/ Normal     Hepatic/GI:  Hepatic/GI Normal    Musculoskeletal:   Arthritis  3 prev back surg Spine Disorders: lumbar Degenerative disease and Disc disease    Neurological:   Chronic Pain Syndrome   Endocrine:   Diabetes, type 2    Dermatological:  Skin Normal        Physical Exam  General:  Morbid Obesity    Airway/Jaw/Neck:  Airway Findings: Mouth Opening: Small, but > 3cm Tongue: Normal  General Airway Assessment: Adult, Possible difficult mask airway, Possible difficult intubation  Mallampati: III  TM Distance: 4 - 6 cm      Dental:  Dental Findings: Periodontal disease, Mild        Mental Status:  Mental Status Findings:  Cooperative, Alert and Oriented         Anesthesia Plan  Type of Anesthesia, risks & benefits discussed:  Anesthesia Type:  general  Patient's Preference:   Intra-op Monitoring Plan: standard ASA  monitors  Intra-op Monitoring Plan Comments:   Post Op Pain Control Plan: per primary service following discharge from PACU and multimodal analgesia  Post Op Pain Control Plan Comments:   Induction:   IV  Beta Blocker:         Informed Consent: Patient understands risks and agrees with Anesthesia plan.  Questions answered. Anesthesia consent signed with patient.  ASA Score: 3     Day of Surgery Review of History & Physical:    H&P update referred to the surgeon.     Anesthesia Plan Notes: Plan GA after total spinal last time, intubated easily        Ready For Surgery From Anesthesia Perspective.

## 2020-11-02 ENCOUNTER — HOSPITAL ENCOUNTER (OUTPATIENT)
Dept: PREADMISSION TESTING | Facility: OTHER | Age: 80
Discharge: HOME | End: 2020-11-02
Attending: ANESTHESIOLOGY
Payer: MEDICARE

## 2020-11-02 DIAGNOSIS — Z01.818 PREOP TESTING: ICD-10-CM

## 2020-11-02 PROCEDURE — U0003 INFECTIOUS AGENT DETECTION BY NUCLEIC ACID (DNA OR RNA); SEVERE ACUTE RESPIRATORY SYNDROME CORONAVIRUS 2 (SARS-COV-2) (CORONAVIRUS DISEASE [COVID-19]), AMPLIFIED PROBE TECHNIQUE, MAKING USE OF HIGH THROUGHPUT TECHNOLOGIES AS DESCRIBED BY CMS-2020-01-R: HCPCS

## 2020-11-03 LAB — SARS-COV-2 RNA RESP QL NAA+PROBE: NOT DETECTED

## 2020-11-05 ENCOUNTER — HOSPITAL ENCOUNTER (OUTPATIENT)
Facility: OTHER | Age: 80
Discharge: HOME HEALTH | End: 2020-11-06
Attending: ORTHOPAEDIC SURGERY | Admitting: ORTHOPAEDIC SURGERY
Payer: MEDICARE

## 2020-11-05 ENCOUNTER — ANESTHESIA (OUTPATIENT)
Dept: SURGERY | Facility: OTHER | Age: 80
End: 2020-11-05
Payer: MEDICARE

## 2020-11-05 DIAGNOSIS — M16.9 OA (OSTEOARTHRITIS) OF HIP: ICD-10-CM

## 2020-11-05 LAB
POCT GLUCOSE: 183 MG/DL (ref 70–110)
POCT GLUCOSE: 191 MG/DL (ref 70–110)
POCT GLUCOSE: 207 MG/DL (ref 70–110)
POCT GLUCOSE: 235 MG/DL (ref 70–110)
POCT GLUCOSE: 258 MG/DL (ref 70–110)

## 2020-11-05 PROCEDURE — 25000003 PHARM REV CODE 250: Performed by: ANESTHESIOLOGY

## 2020-11-05 PROCEDURE — 63600175 PHARM REV CODE 636 W HCPCS: Performed by: ANESTHESIOLOGY

## 2020-11-05 PROCEDURE — 37000009 HC ANESTHESIA EA ADD 15 MINS: Performed by: ORTHOPAEDIC SURGERY

## 2020-11-05 PROCEDURE — 82962 GLUCOSE BLOOD TEST: CPT | Performed by: ORTHOPAEDIC SURGERY

## 2020-11-05 PROCEDURE — 88304 TISSUE EXAM BY PATHOLOGIST: CPT | Mod: 26,,, | Performed by: STUDENT IN AN ORGANIZED HEALTH CARE EDUCATION/TRAINING PROGRAM

## 2020-11-05 PROCEDURE — 63600175 PHARM REV CODE 636 W HCPCS: Performed by: NURSE PRACTITIONER

## 2020-11-05 PROCEDURE — 63600175 PHARM REV CODE 636 W HCPCS: Performed by: ORTHOPAEDIC SURGERY

## 2020-11-05 PROCEDURE — 94761 N-INVAS EAR/PLS OXIMETRY MLT: CPT

## 2020-11-05 PROCEDURE — 25000003 PHARM REV CODE 250: Performed by: ORTHOPAEDIC SURGERY

## 2020-11-05 PROCEDURE — 25000003 PHARM REV CODE 250: Performed by: NURSE ANESTHETIST, CERTIFIED REGISTERED

## 2020-11-05 PROCEDURE — C9290 INJ, BUPIVACAINE LIPOSOME: HCPCS | Performed by: ORTHOPAEDIC SURGERY

## 2020-11-05 PROCEDURE — 36000711: Performed by: ORTHOPAEDIC SURGERY

## 2020-11-05 PROCEDURE — 97162 PT EVAL MOD COMPLEX 30 MIN: CPT

## 2020-11-05 PROCEDURE — 71000033 HC RECOVERY, INTIAL HOUR: Performed by: ORTHOPAEDIC SURGERY

## 2020-11-05 PROCEDURE — 99900035 HC TECH TIME PER 15 MIN (STAT)

## 2020-11-05 PROCEDURE — 25000003 PHARM REV CODE 250: Performed by: NURSE PRACTITIONER

## 2020-11-05 PROCEDURE — 27000190 HC CPAP FULL FACE MASK W/VALVE

## 2020-11-05 PROCEDURE — 37000008 HC ANESTHESIA 1ST 15 MINUTES: Performed by: ORTHOPAEDIC SURGERY

## 2020-11-05 PROCEDURE — 88304 TISSUE EXAM BY PATHOLOGIST: CPT | Performed by: STUDENT IN AN ORGANIZED HEALTH CARE EDUCATION/TRAINING PROGRAM

## 2020-11-05 PROCEDURE — 88311 DECALCIFY TISSUE: CPT | Mod: 26,,, | Performed by: STUDENT IN AN ORGANIZED HEALTH CARE EDUCATION/TRAINING PROGRAM

## 2020-11-05 PROCEDURE — 88311 DECALCIFY TISSUE: CPT | Performed by: STUDENT IN AN ORGANIZED HEALTH CARE EDUCATION/TRAINING PROGRAM

## 2020-11-05 PROCEDURE — 27201423 OPTIME MED/SURG SUP & DEVICES STERILE SUPPLY: Performed by: ORTHOPAEDIC SURGERY

## 2020-11-05 PROCEDURE — 63600175 PHARM REV CODE 636 W HCPCS: Performed by: NURSE ANESTHETIST, CERTIFIED REGISTERED

## 2020-11-05 PROCEDURE — 88311 PR  DECALCIFY TISSUE: ICD-10-PCS | Mod: 26,,, | Performed by: STUDENT IN AN ORGANIZED HEALTH CARE EDUCATION/TRAINING PROGRAM

## 2020-11-05 PROCEDURE — 97116 GAIT TRAINING THERAPY: CPT

## 2020-11-05 PROCEDURE — 94660 CPAP INITIATION&MGMT: CPT

## 2020-11-05 PROCEDURE — 88304 PR  SURG PATH,LEVEL III: ICD-10-PCS | Mod: 26,,, | Performed by: STUDENT IN AN ORGANIZED HEALTH CARE EDUCATION/TRAINING PROGRAM

## 2020-11-05 PROCEDURE — 94799 UNLISTED PULMONARY SVC/PX: CPT

## 2020-11-05 PROCEDURE — 71000039 HC RECOVERY, EACH ADD'L HOUR: Performed by: ORTHOPAEDIC SURGERY

## 2020-11-05 PROCEDURE — C1776 JOINT DEVICE (IMPLANTABLE): HCPCS | Performed by: ORTHOPAEDIC SURGERY

## 2020-11-05 PROCEDURE — 36000710: Performed by: ORTHOPAEDIC SURGERY

## 2020-11-05 DEVICE — IMPLANTABLE DEVICE: Type: IMPLANTABLE DEVICE | Site: HIP | Status: FUNCTIONAL

## 2020-11-05 DEVICE — IMPLANTABLE DEVICE
Type: IMPLANTABLE DEVICE | Site: HIP | Status: NON-FUNCTIONAL
Removed: 2020-11-30

## 2020-11-05 RX ORDER — HYDROCODONE BITARTRATE AND ACETAMINOPHEN 10; 325 MG/1; MG/1
1 TABLET ORAL EVERY 4 HOURS PRN
Status: DISCONTINUED | OUTPATIENT
Start: 2020-11-05 | End: 2020-11-06 | Stop reason: HOSPADM

## 2020-11-05 RX ORDER — SODIUM CHLORIDE 0.9 % (FLUSH) 0.9 %
5 SYRINGE (ML) INJECTION
Status: DISCONTINUED | OUTPATIENT
Start: 2020-11-05 | End: 2020-11-06 | Stop reason: HOSPADM

## 2020-11-05 RX ORDER — DULOXETIN HYDROCHLORIDE 30 MG/1
30 CAPSULE, DELAYED RELEASE ORAL DAILY
Status: DISCONTINUED | OUTPATIENT
Start: 2020-11-05 | End: 2020-11-06 | Stop reason: HOSPADM

## 2020-11-05 RX ORDER — SODIUM CHLORIDE, SODIUM LACTATE, POTASSIUM CHLORIDE, CALCIUM CHLORIDE 600; 310; 30; 20 MG/100ML; MG/100ML; MG/100ML; MG/100ML
INJECTION, SOLUTION INTRAVENOUS CONTINUOUS
Status: DISCONTINUED | OUTPATIENT
Start: 2020-11-05 | End: 2020-11-05

## 2020-11-05 RX ORDER — ZOLPIDEM TARTRATE 5 MG/1
10 TABLET ORAL NIGHTLY PRN
Status: DISCONTINUED | OUTPATIENT
Start: 2020-11-05 | End: 2020-11-05

## 2020-11-05 RX ORDER — LISINOPRIL 20 MG/1
40 TABLET ORAL DAILY
Status: DISCONTINUED | OUTPATIENT
Start: 2020-11-05 | End: 2020-11-06 | Stop reason: HOSPADM

## 2020-11-05 RX ORDER — ONDANSETRON 2 MG/ML
4 INJECTION INTRAMUSCULAR; INTRAVENOUS DAILY PRN
Status: DISCONTINUED | OUTPATIENT
Start: 2020-11-05 | End: 2020-11-05 | Stop reason: HOSPADM

## 2020-11-05 RX ORDER — TRANEXAMIC ACID 100 MG/ML
INJECTION, SOLUTION INTRAVENOUS
Status: DISCONTINUED | OUTPATIENT
Start: 2020-11-05 | End: 2020-11-05 | Stop reason: HOSPADM

## 2020-11-05 RX ORDER — ONDANSETRON 8 MG/1
8 TABLET, ORALLY DISINTEGRATING ORAL EVERY 8 HOURS PRN
Status: DISCONTINUED | OUTPATIENT
Start: 2020-11-05 | End: 2020-11-06 | Stop reason: HOSPADM

## 2020-11-05 RX ORDER — MORPHINE SULFATE 2 MG/ML
10 INJECTION, SOLUTION INTRAMUSCULAR; INTRAVENOUS
Status: DISCONTINUED | OUTPATIENT
Start: 2020-11-05 | End: 2020-11-05

## 2020-11-05 RX ORDER — ACETAMINOPHEN 500 MG
1000 TABLET ORAL
Status: COMPLETED | OUTPATIENT
Start: 2020-11-05 | End: 2020-11-05

## 2020-11-05 RX ORDER — NEOSTIGMINE METHYLSULFATE 1 MG/ML
INJECTION, SOLUTION INTRAVENOUS
Status: DISCONTINUED | OUTPATIENT
Start: 2020-11-05 | End: 2020-11-05

## 2020-11-05 RX ORDER — CEFAZOLIN SODIUM 2 G/50ML
2 SOLUTION INTRAVENOUS
Status: COMPLETED | OUTPATIENT
Start: 2020-11-05 | End: 2020-11-06

## 2020-11-05 RX ORDER — OXYCODONE HYDROCHLORIDE 5 MG/1
5 TABLET ORAL
Status: DISCONTINUED | OUTPATIENT
Start: 2020-11-05 | End: 2020-11-05 | Stop reason: HOSPADM

## 2020-11-05 RX ORDER — NAPROXEN SODIUM 220 MG/1
81 TABLET, FILM COATED ORAL 2 TIMES DAILY
Status: DISCONTINUED | OUTPATIENT
Start: 2020-11-05 | End: 2020-11-06 | Stop reason: HOSPADM

## 2020-11-05 RX ORDER — TALC
9 POWDER (GRAM) TOPICAL NIGHTLY PRN
Status: DISCONTINUED | OUTPATIENT
Start: 2020-11-05 | End: 2020-11-06 | Stop reason: HOSPADM

## 2020-11-05 RX ORDER — PHENYLEPHRINE HYDROCHLORIDE 10 MG/ML
INJECTION INTRAVENOUS
Status: DISCONTINUED | OUTPATIENT
Start: 2020-11-05 | End: 2020-11-05

## 2020-11-05 RX ORDER — FENTANYL CITRATE 50 UG/ML
INJECTION, SOLUTION INTRAMUSCULAR; INTRAVENOUS
Status: DISCONTINUED | OUTPATIENT
Start: 2020-11-05 | End: 2020-11-05

## 2020-11-05 RX ORDER — MORPHINE SULFATE 2 MG/ML
5 INJECTION, SOLUTION INTRAMUSCULAR; INTRAVENOUS
Status: DISCONTINUED | OUTPATIENT
Start: 2020-11-05 | End: 2020-11-06 | Stop reason: HOSPADM

## 2020-11-05 RX ORDER — HYDRALAZINE HYDROCHLORIDE 25 MG/1
25 TABLET, FILM COATED ORAL EVERY 12 HOURS PRN
Status: DISCONTINUED | OUTPATIENT
Start: 2020-11-05 | End: 2020-11-06 | Stop reason: HOSPADM

## 2020-11-05 RX ORDER — LABETALOL HYDROCHLORIDE 5 MG/ML
10 INJECTION, SOLUTION INTRAVENOUS ONCE
Status: COMPLETED | OUTPATIENT
Start: 2020-11-05 | End: 2020-11-05

## 2020-11-05 RX ORDER — DIPHENHYDRAMINE HYDROCHLORIDE 50 MG/ML
INJECTION INTRAMUSCULAR; INTRAVENOUS
Status: DISCONTINUED | OUTPATIENT
Start: 2020-11-05 | End: 2020-11-05

## 2020-11-05 RX ORDER — GLUCAGON 1 MG
1 KIT INJECTION
Status: DISCONTINUED | OUTPATIENT
Start: 2020-11-05 | End: 2020-11-06 | Stop reason: HOSPADM

## 2020-11-05 RX ORDER — MEPERIDINE HYDROCHLORIDE 25 MG/ML
12.5 INJECTION INTRAMUSCULAR; INTRAVENOUS; SUBCUTANEOUS ONCE AS NEEDED
Status: DISCONTINUED | OUTPATIENT
Start: 2020-11-05 | End: 2020-11-05 | Stop reason: HOSPADM

## 2020-11-05 RX ORDER — HYDROCODONE BITARTRATE AND ACETAMINOPHEN 5; 325 MG/1; MG/1
1 TABLET ORAL EVERY 4 HOURS PRN
Status: DISCONTINUED | OUTPATIENT
Start: 2020-11-05 | End: 2020-11-06 | Stop reason: HOSPADM

## 2020-11-05 RX ORDER — AMLODIPINE BESYLATE 5 MG/1
10 TABLET ORAL DAILY
Status: DISCONTINUED | OUTPATIENT
Start: 2020-11-05 | End: 2020-11-06 | Stop reason: HOSPADM

## 2020-11-05 RX ORDER — LABETALOL HYDROCHLORIDE 5 MG/ML
20 INJECTION, SOLUTION INTRAVENOUS ONCE
Status: COMPLETED | OUTPATIENT
Start: 2020-11-05 | End: 2020-11-05

## 2020-11-05 RX ORDER — LIDOCAINE HCL/PF 100 MG/5ML
SYRINGE (ML) INTRAVENOUS
Status: DISCONTINUED | OUTPATIENT
Start: 2020-11-05 | End: 2020-11-05

## 2020-11-05 RX ORDER — LABETALOL HYDROCHLORIDE 5 MG/ML
INJECTION, SOLUTION INTRAVENOUS
Status: DISPENSED
Start: 2020-11-05 | End: 2020-11-05

## 2020-11-05 RX ORDER — FAMOTIDINE 20 MG/1
20 TABLET, FILM COATED ORAL DAILY
Status: DISCONTINUED | OUTPATIENT
Start: 2020-11-05 | End: 2020-11-06 | Stop reason: HOSPADM

## 2020-11-05 RX ORDER — LIDOCAINE HYDROCHLORIDE 10 MG/ML
0.5 INJECTION, SOLUTION EPIDURAL; INFILTRATION; INTRACAUDAL; PERINEURAL ONCE
Status: DISCONTINUED | OUTPATIENT
Start: 2020-11-05 | End: 2020-11-05 | Stop reason: HOSPADM

## 2020-11-05 RX ORDER — SODIUM CHLORIDE 0.9 % (FLUSH) 0.9 %
3 SYRINGE (ML) INJECTION
Status: DISCONTINUED | OUTPATIENT
Start: 2020-11-05 | End: 2020-11-06 | Stop reason: HOSPADM

## 2020-11-05 RX ORDER — DEXTROSE MONOHYDRATE AND SODIUM CHLORIDE 5; .9 G/100ML; G/100ML
INJECTION, SOLUTION INTRAVENOUS CONTINUOUS
Status: DISCONTINUED | OUTPATIENT
Start: 2020-11-05 | End: 2020-11-05

## 2020-11-05 RX ORDER — POLYETHYLENE GLYCOL 3350 17 G/17G
17 POWDER, FOR SOLUTION ORAL DAILY
Status: DISCONTINUED | OUTPATIENT
Start: 2020-11-05 | End: 2020-11-06 | Stop reason: HOSPADM

## 2020-11-05 RX ORDER — ONDANSETRON HYDROCHLORIDE 2 MG/ML
INJECTION, SOLUTION INTRAMUSCULAR; INTRAVENOUS
Status: DISCONTINUED | OUTPATIENT
Start: 2020-11-05 | End: 2020-11-05

## 2020-11-05 RX ORDER — INSULIN ASPART 100 [IU]/ML
0-5 INJECTION, SOLUTION INTRAVENOUS; SUBCUTANEOUS
Status: DISCONTINUED | OUTPATIENT
Start: 2020-11-05 | End: 2020-11-06 | Stop reason: HOSPADM

## 2020-11-05 RX ORDER — SODIUM CHLORIDE 9 MG/ML
INJECTION, SOLUTION INTRAVENOUS CONTINUOUS
Status: DISCONTINUED | OUTPATIENT
Start: 2020-11-05 | End: 2020-11-06 | Stop reason: HOSPADM

## 2020-11-05 RX ORDER — GLIPIZIDE 5 MG/1
5 TABLET, FILM COATED, EXTENDED RELEASE ORAL
Status: DISCONTINUED | OUTPATIENT
Start: 2020-11-05 | End: 2020-11-06 | Stop reason: HOSPADM

## 2020-11-05 RX ORDER — IBUPROFEN 200 MG
16 TABLET ORAL
Status: DISCONTINUED | OUTPATIENT
Start: 2020-11-05 | End: 2020-11-06 | Stop reason: HOSPADM

## 2020-11-05 RX ORDER — HYDROMORPHONE HYDROCHLORIDE 2 MG/ML
0.4 INJECTION, SOLUTION INTRAMUSCULAR; INTRAVENOUS; SUBCUTANEOUS EVERY 5 MIN PRN
Status: DISCONTINUED | OUTPATIENT
Start: 2020-11-05 | End: 2020-11-05 | Stop reason: HOSPADM

## 2020-11-05 RX ORDER — IBUPROFEN 200 MG
24 TABLET ORAL
Status: DISCONTINUED | OUTPATIENT
Start: 2020-11-05 | End: 2020-11-06 | Stop reason: HOSPADM

## 2020-11-05 RX ORDER — SODIUM CHLORIDE 9 MG/ML
INJECTION, SOLUTION INTRAVENOUS CONTINUOUS
Status: DISCONTINUED | OUTPATIENT
Start: 2020-11-05 | End: 2020-11-05

## 2020-11-05 RX ORDER — CELECOXIB 200 MG/1
200 CAPSULE ORAL 2 TIMES DAILY
Status: DISCONTINUED | OUTPATIENT
Start: 2020-11-05 | End: 2020-11-06 | Stop reason: HOSPADM

## 2020-11-05 RX ORDER — CEFAZOLIN SODIUM 1 G/3ML
2 INJECTION, POWDER, FOR SOLUTION INTRAMUSCULAR; INTRAVENOUS
Status: COMPLETED | OUTPATIENT
Start: 2020-11-05 | End: 2020-11-05

## 2020-11-05 RX ORDER — ROCURONIUM BROMIDE 10 MG/ML
INJECTION, SOLUTION INTRAVENOUS
Status: DISCONTINUED | OUTPATIENT
Start: 2020-11-05 | End: 2020-11-05

## 2020-11-05 RX ORDER — BACLOFEN 10 MG/1
10 TABLET ORAL 2 TIMES DAILY
Status: DISCONTINUED | OUTPATIENT
Start: 2020-11-05 | End: 2020-11-06 | Stop reason: HOSPADM

## 2020-11-05 RX ORDER — PROPOFOL 10 MG/ML
VIAL (ML) INTRAVENOUS
Status: DISCONTINUED | OUTPATIENT
Start: 2020-11-05 | End: 2020-11-05

## 2020-11-05 RX ADMIN — HYDROCODONE BITARTRATE AND ACETAMINOPHEN 1 TABLET: 10; 325 TABLET ORAL at 11:11

## 2020-11-05 RX ADMIN — HYDROMORPHONE HYDROCHLORIDE 0.4 MG: 2 INJECTION, SOLUTION INTRAMUSCULAR; INTRAVENOUS; SUBCUTANEOUS at 08:11

## 2020-11-05 RX ADMIN — LABETALOL HYDROCHLORIDE 20 MG: 5 INJECTION INTRAVENOUS at 08:11

## 2020-11-05 RX ADMIN — AMLODIPINE BESYLATE 10 MG: 5 TABLET ORAL at 11:11

## 2020-11-05 RX ADMIN — POLYETHYLENE GLYCOL 3350 17 G: 17 POWDER, FOR SOLUTION ORAL at 11:11

## 2020-11-05 RX ADMIN — GLYCOPYRROLATE 0.6 MG: 0.2 INJECTION, SOLUTION INTRAMUSCULAR; INTRAVITREAL at 08:11

## 2020-11-05 RX ADMIN — NEOSTIGMINE METHYLSULFATE 5 MG: 1 INJECTION INTRAVENOUS at 08:11

## 2020-11-05 RX ADMIN — DIPHENHYDRAMINE HYDROCHLORIDE 6.25 MG: 50 INJECTION, SOLUTION INTRAMUSCULAR; INTRAVENOUS at 07:11

## 2020-11-05 RX ADMIN — GLIPIZIDE 5 MG: 5 TABLET, FILM COATED, EXTENDED RELEASE ORAL at 11:11

## 2020-11-05 RX ADMIN — MORPHINE SULFATE 5 MG: 2 INJECTION, SOLUTION INTRAMUSCULAR; INTRAVENOUS at 05:11

## 2020-11-05 RX ADMIN — BACLOFEN 10 MG: 10 TABLET ORAL at 08:11

## 2020-11-05 RX ADMIN — SODIUM CHLORIDE, SODIUM LACTATE, POTASSIUM CHLORIDE, AND CALCIUM CHLORIDE: 600; 310; 30; 20 INJECTION, SOLUTION INTRAVENOUS at 06:11

## 2020-11-05 RX ADMIN — SODIUM CHLORIDE: 0.9 INJECTION, SOLUTION INTRAVENOUS at 11:11

## 2020-11-05 RX ADMIN — FAMOTIDINE 20 MG: 20 TABLET ORAL at 11:11

## 2020-11-05 RX ADMIN — BACLOFEN 10 MG: 10 TABLET ORAL at 11:11

## 2020-11-05 RX ADMIN — MORPHINE SULFATE 5 MG: 2 INJECTION, SOLUTION INTRAMUSCULAR; INTRAVENOUS at 08:11

## 2020-11-05 RX ADMIN — CARBOXYMETHYLCELLULOSE SODIUM 2 DROP: 2.5 SOLUTION/ DROPS OPHTHALMIC at 07:11

## 2020-11-05 RX ADMIN — ACETAMINOPHEN 1000 MG: 500 TABLET, FILM COATED ORAL at 05:11

## 2020-11-05 RX ADMIN — GLYCOPYRROLATE 0.2 MG: 0.2 INJECTION, SOLUTION INTRAMUSCULAR; INTRAVITREAL at 07:11

## 2020-11-05 RX ADMIN — LISINOPRIL 40 MG: 20 TABLET ORAL at 11:11

## 2020-11-05 RX ADMIN — PHENYLEPHRINE HYDROCHLORIDE 200 MCG: 10 INJECTION INTRAVENOUS at 07:11

## 2020-11-05 RX ADMIN — CELECOXIB 200 MG: 200 CAPSULE ORAL at 08:11

## 2020-11-05 RX ADMIN — LABETALOL HYDROCHLORIDE 10 MG: 5 INJECTION INTRAVENOUS at 09:11

## 2020-11-05 RX ADMIN — INSULIN ASPART 1 UNITS: 100 INJECTION, SOLUTION INTRAVENOUS; SUBCUTANEOUS at 11:11

## 2020-11-05 RX ADMIN — ROCURONIUM BROMIDE 50 MG: 10 INJECTION, SOLUTION INTRAVENOUS at 07:11

## 2020-11-05 RX ADMIN — CEFAZOLIN SODIUM 2 G: 2 SOLUTION INTRAVENOUS at 03:11

## 2020-11-05 RX ADMIN — ASPIRIN 81 MG: 81 TABLET, CHEWABLE ORAL at 08:11

## 2020-11-05 RX ADMIN — HYDROCODONE BITARTRATE AND ACETAMINOPHEN 1 TABLET: 5; 325 TABLET ORAL at 03:11

## 2020-11-05 RX ADMIN — FENTANYL CITRATE 100 MCG: 50 INJECTION, SOLUTION INTRAMUSCULAR; INTRAVENOUS at 07:11

## 2020-11-05 RX ADMIN — ASPIRIN 81 MG: 81 TABLET, CHEWABLE ORAL at 11:11

## 2020-11-05 RX ADMIN — CEFAZOLIN SODIUM 2 G: 2 SOLUTION INTRAVENOUS at 11:11

## 2020-11-05 RX ADMIN — ONDANSETRON 4 MG: 2 INJECTION, SOLUTION INTRAMUSCULAR; INTRAVENOUS at 07:11

## 2020-11-05 RX ADMIN — LIDOCAINE HYDROCHLORIDE 50 MG: 20 INJECTION, SOLUTION INTRAVENOUS at 07:11

## 2020-11-05 RX ADMIN — DULOXETINE HYDROCHLORIDE 30 MG: 30 CAPSULE, DELAYED RELEASE ORAL at 11:11

## 2020-11-05 RX ADMIN — INSULIN ASPART 3 UNITS: 100 INJECTION, SOLUTION INTRAVENOUS; SUBCUTANEOUS at 04:11

## 2020-11-05 RX ADMIN — PROPOFOL 250 MG: 10 INJECTION, EMULSION INTRAVENOUS at 07:11

## 2020-11-05 RX ADMIN — CELECOXIB 200 MG: 200 CAPSULE ORAL at 11:11

## 2020-11-05 RX ADMIN — CEFAZOLIN 2 G: 330 INJECTION, POWDER, FOR SOLUTION INTRAMUSCULAR; INTRAVENOUS at 07:11

## 2020-11-05 NOTE — PLAN OF CARE
Problem: Physical Therapy Goal  Goal: Physical Therapy Goal  Description: Goals to be met by: 20     Patient will increase functional independence with mobility by performin. Supine to sit with supervision.   2. Sit to supine with supervision.   3. Sit<>stand transfer with supervision using rolling walker.   4. Gait > 150 feet with SBA using rolling walker.   5. Ascend/descend 1 threshold stairs with no handrails with CGA and with AD.  6. Verbalize anterior/dumont hip precautions without verbal cues.  Outcome: Ongoing, Progressing   Pt presented sitting in bedside chair upon arrival of PT. Pt agreeable to PT. Pt sit <> Stand w/ RW and CGA. Pt amb'd 60' w/ Rw and CGA. Iv pole in tow.

## 2020-11-05 NOTE — TRANSFER OF CARE
"Anesthesia Transfer of Care Note    Patient: Jayne You    Procedure(s) Performed: Procedure(s) (LRB):  ARTHROPLASTY, HIP (Left)    Patient location: PACU    Anesthesia Type: general    Transport from OR: Transported from OR on 2-3 L/min O2 by NC with adequate spontaneous ventilation    Post pain: adequate analgesia    Post assessment: no apparent anesthetic complications    Post vital signs: stable    Level of consciousness: awake    Nausea/Vomiting: no nausea/vomiting    Complications: none    Transfer of care protocol was followed      Last vitals:   Visit Vitals  BP (!) 158/79   Pulse 94   Temp 36.1 °C (97 °F)   Resp 18   Ht 5' 4" (1.626 m)   Wt 103.9 kg (229 lb)   SpO2 96%   Breastfeeding No   BMI 39.31 kg/m²     "

## 2020-11-05 NOTE — OR NURSING
Pt blood pressure 165/ 70. Pt states pain tolerable and falls off to sleep. Report called to JADIEL Leal RN [address] Daughter updated by phone and sent to pt room. Pt placed on transport.

## 2020-11-05 NOTE — ANESTHESIA POSTPROCEDURE EVALUATION
Anesthesia Post Evaluation    Patient: Jayne You    Procedure(s) Performed: Procedure(s) (LRB):  ARTHROPLASTY, HIP (Left)    Final Anesthesia Type: spinal    Patient location during evaluation: PACU  Level of consciousness: awake and alert  Post-procedure vital signs: reviewed and stable  Pain management: adequate  Airway patency: patent    PONV status at discharge: No PONV  Anesthetic complications: no      Cardiovascular status: blood pressure returned to baseline and hemodynamically stable  Respiratory status: unassisted and spontaneous ventilation  Hydration status: euvolemic  Follow-up not needed.          Vitals Value Taken Time   /80 11/05/20 1010   Temp 36.4 °C (97.5 °F) 11/05/20 1010   Pulse 82 11/05/20 1010   Resp 18 11/05/20 1010   SpO2 96 % 11/05/20 1010         Event Time   Out of Recovery 09:58:23         Pain/Rahul Score: Pain Rating Prior to Med Admin: 5 (11/5/2020  8:50 AM)  Pain Rating Post Med Admin: 4 (states tolerable) (11/5/2020  9:25 AM)  Rahul Score: 8 (11/5/2020  9:25 AM)

## 2020-11-05 NOTE — OR NURSING
Anesthesia Douglas notified of blood pressure. See new med ordered given. [facility] JADIEL Sena consulted on pt and notified by text with response of blood pressure issues in recovery.

## 2020-11-05 NOTE — CONSULTS
Consult Note  IM    Consult Requested By: Isaiah Mccormick MD  Reason for Consult: DM/HTN/KHAI/Lipids/Obese    SUBJECTIVE:     History of Present Illness:  Patient is a 80 y.o. female presents with scheduled left hip replacement. Seen post op.  VS noted with HTN.  Extensive hx as outlined below.  Discussed with Dr. Mccormick.  Pre-op/EPIC reviewed.  No CP/SOB/N/V/D/F/C.      Past Medical History:   Diagnosis Date    Arthritis     Cardiomyopathy     Depression     Diabetes mellitus     Hypertension     Lipids abnormal     Lumbar herniated disc     Obesity     RBBB     Sleep apnea      Past Surgical History:   Procedure Laterality Date    APPENDECTOMY      BACK SURGERY  2014,2016,2017    BREAST SURGERY Bilateral     reduction    EYE SURGERY Bilateral     JOINT REPLACEMENT Right 2006    hip    TOTAL HIP ARTHROPLASTY Right 2006    TOTAL KNEE ARTHROPLASTY Left 7/18/2019    Procedure: ARTHROPLASTY, KNEE, TOTAL;  Surgeon: Isaiah Mccormick MD;  Location: [facility];  Service: Orthopedics;  Laterality: Left;     History reviewed. No pertinent family history.  Social History     Tobacco Use    Smoking status: Never Smoker    Smokeless tobacco: Never Used   Substance Use Topics    Alcohol use: No    Drug use: Not on file       Review of patient's allergies indicates:   Allergen Reactions    Codeine Nausea And Vomiting     Pt reports can take percocet        Review of Systems:  Constitutional: No fever or chills  Respiratory: No cough or shortness of breath  Cardiovascular: No chest pain or palpitations  Gastrointestinal: No nausea or vomiting  Neurological: No confusion or weakness    OBJECTIVE:     Vital Signs (Most Recent)  Temp: 97.5 °F (36.4 °C) (11/05/20 1010)  Pulse: 82 (11/05/20 1010)  Resp: 18 (11/05/20 1010)  BP: (!) 168/80 (11/05/20 1010)  SpO2: 96 % (11/05/20 1010)    Vital Signs Range (Last 24H):  Temp:  [97 °F (36.1 °C)-97.8 °F (36.6 °C)]   Pulse:  [76-94]   Resp:  [16-18]   BP:  (158-206)/()   SpO2:  [96 %-99 %]       Intake/Output Summary (Last 24 hours) at 11/5/2020 1218  Last data filed at 11/5/2020 0927  Gross per 24 hour   Intake 1700 ml   Output 125 ml   Net 1575 ml       Physical Exam:  General appearance: Well developed, well nourished  Eyes:  Conjunctivae/corneas clear. PERRL.  Lungs: Normal respiratory effort,   clear to auscultation bilaterally   Heart: Regular rate and rhythm, S1, S2 normal, no murmur, rub or anna.  Abdomen: Soft, non-tender non-distended; bowel sounds normal; no masses,  no organomegaly, obese, massive pannus.   Extremities: No cyanosis or clubbing. No edema.    Skin: Skin color, texture, turgor normal. No rashes or lesions  Neurologic: Normal strength and tone. No focal numbness or weakness   Left hip CDI  Proctor      Laboratory:  No results for input(s): WBC, RBC, HGB, HCT, PLT, MCV, MCH, MCHC in the last 24 hours.  BMP: No results for input(s): GLU, NA, K, CL, CO2, BUN, CREATININE, CALCIUM, MG in the last 168 hours.    Invalid input(s):  PHOS  Lab Results   Component Value Date    CALCIUM 9.0 10/27/2020     BNP  No results for input(s): BNP, BNPTRIAGEBLO in the last 168 hours.No results found for: URICACIDNo results found for: IRON, TIBC, FERRITIN, SATURATEDIRO  Lab Results   Component Value Date    CALCIUM 9.0 10/27/2020       Diagnostic Results:  X-Ray Hip 1 View Left   Final Result      No acute abnormality.         Electronically signed by: Wilian Al MD   Date:    11/05/2020   Time:    09:21          ASSESSMENT/PLAN:     1. Left hip (M16.9):  Per ortho/therapy teams.  2. HTN (I10):  Home meds.  Control pain.  Hydralazine PRN.  3. DM (E11.65):  Oral meds reordered/ADA/SSI.  Recommend GLP by PCP for weight loss.  4. Obese:  As above  5. KHAI (G47.33):  CPAP ordered.  6. Cardiomyopathy:  Place on tele.   7. DVT :  ASA BID.    Thanks for consult  See above  Will follow along.

## 2020-11-05 NOTE — ANESTHESIA PROCEDURE NOTES
Intubation  Performed by: Jeanette Page CRNA  Authorized by: Valerio Michelle MD     Intubation:     Induction:  Intravenous    Intubated:  Postinduction    Mask Ventilation:  Easy mask    Attempts:  1    Attempted By:  CRNA    Method of Intubation:  Video laryngoscopy    Blade:  Mclain 3    Laryngeal View Grade: Grade I - full view of chords      Difficult Airway Encountered?: No      Complications:  None    Airway Device:  Oral endotracheal tube    Airway Device Size:  7.5    Style/Cuff Inflation:  Cuffed    Inflation Amount (mL):  4    Tube secured:  21    Secured at:  The lips    Placement Verified By:  Capnometry    Complicating Factors:  Short neck and obesity    Findings Post-Intubation:  BS equal bilateral

## 2020-11-05 NOTE — PT/OT/SLP PROGRESS
Physical Therapy Treatment    Patient Name:  Jayne You   MRN:  [id number]    Recommendations:     Discharge Recommendations:  home health PT, home health OT(initial 24/7 assistance)   Discharge Equipment Recommendations: none   Barriers to discharge: Inaccessible home    Assessment:     Jayne You is a 80 y.o. female admitted with a medical diagnosis of OA (osteoarthritis) of hip.  She presents with the following impairments/functional limitations:  weakness, impaired endurance, impaired self care skills, impaired functional mobilty, gait instability, impaired balance, decreased coordination, decreased lower extremity function, decreased safety awareness, decreased ROM, impaired cardiopulmonary response to activity, orthopedic precautions ,  Pt presented sitting in bedside chair upon arrival of PT. Pt agreeable to PT. Pt sit <> Stand w/ RW and CGA. Pt amb'd 60' w/ Rw and CGA. Iv pole in tow..    Rehab Prognosis: Good; patient would benefit from acute skilled PT services to address these deficits and reach maximum level of function.    Recent Surgery: Procedure(s) (LRB):  ARTHROPLASTY, HIP (Left) Day of Surgery    Plan:     During this hospitalization, patient to be seen BID to address the identified rehab impairments via gait training, therapeutic activities, therapeutic exercises and progress toward the following goals:    · Plan of Care Expires:  11/12/20    Subjective     Chief Complaint: none stated  Patient/Family Comments/goals: none stated  Pain/Comfort:  · Pain Rating 1: 0/10      Objective:     Communicated with ns(shirley) prior to session.  Patient found up in chair with cryotherapy, FCD, espinoza catheter, oxygen, hip abduction pillow upon PT entry to room.     General Precautions: Standard, fall, diabetic, respiratory   Orthopedic Precautions:LLE weight bearing as tolerated, LLE anterior precautions   Braces: N/A     Functional Mobility:  · Transfers:     · Sit to Stand:  contact guard assistance with  rolling walker  · Gait: 60' w/ Rw and CGA      AM-PAC 6 CLICK MOBILITY  Turning over in bed (including adjusting bedclothes, sheets and blankets)?: 3  Sitting down on and standing up from a chair with arms (e.g., wheelchair, bedside commode, etc.): 3  Moving from lying on back to sitting on the side of the bed?: 3  Moving to and from a bed to a chair (including a wheelchair)?: 3  Need to walk in hospital room?: 3  Climbing 3-5 steps with a railing?: 2  Basic Mobility Total Score: 17         Patient left HOB elevated with all lines intact, call button in reach and ns notified..    GOALS:   Multidisciplinary Problems     Physical Therapy Goals        Problem: Physical Therapy Goal    Goal Priority Disciplines Outcome Goal Variances Interventions   Physical Therapy Goal     PT, PT/OT Ongoing, Progressing     Description: Goals to be met by: 20     Patient will increase functional independence with mobility by performin. Supine to sit with supervision.   2. Sit to supine with supervision.   3. Sit<>stand transfer with supervision using rolling walker.   4. Gait > 150 feet with SBA using rolling walker.   5. Ascend/descend 1 threshold stairs with no handrails with CGA and with AD.  6. Verbalize anterior/dumont hip precautions without verbal cues.                   Time Tracking:     PT Received On: 20  PT Start Time: 1345     PT Stop Time: 1405  PT Total Time (min): 20 min     Billable Minutes: Gait Training 20    Treatment Type: Treatment  PT/PTA: PTA     PTA Visit Number: 1     Remington Fang PTA  2020

## 2020-11-05 NOTE — PLAN OF CARE
Problem: Physical Therapy Goal  Goal: Physical Therapy Goal  Description: Goals to be met by: 20     Patient will increase functional independence with mobility by performin. Supine to sit with supervision.   2. Sit to supine with supervision.   3. Sit<>stand transfer with supervision using rolling walker.   4. Gait > 150 feet with SBA using rolling walker.   5. Ascend/descend 1 threshold stairs with no handrails with CGA and with AD.  6. Verbalize anterior/dumotn hip precautions without verbal cues.  Outcome: Ongoing, Progressing     Patient evaluated by PT and goals established. Patient with minimal to moderate pain during therapy session. Review of anterior/dumont hip precautions performed with verbal and visual demo with pt requiring occasional cueing for adherence during OOB mobility. Bed mobility with Naheed, sit<>stand with CGA with RW, and amb x 25 ft with RW and Naheed progressing to CGA - limited in further gait due to decreased endurance.  PT will continue to follow and progress as tolerated. Rec for d/c to home with HH PT/OT and with initial / assistance. Please see progress note for detailed plan of care and recommendations.

## 2020-11-05 NOTE — PLAN OF CARE
LMSW met with the patient at the bedside.     Patient is alert and oriented with no communication barriers.     Patient has a RW, BSC, SC, and hip kit in the home. LMSW talked to the patient about freedom of choice. Patient choice is [facility]. LMSW sent referral to [facility].     Patients PCP is correct on the face sheet. Patient choice pharmacy is bedside delivery.     Patients family will transport the patient home at discharge.     CM team will continue to follow.          11/05/20 6277   Discharge Assessment   Assessment Type Discharge Planning Assessment   Confirmed/corrected address and phone number on facesheet? Yes   Assessment information obtained from? Patient   Communicated expected length of stay with patient/caregiver no   Prior to hospitilization cognitive status: Alert/Oriented   Prior to hospitalization functional status: Assistive Equipment   Current cognitive status: Alert/Oriented   Current Functional Status: Assistive Equipment   Lives With alone   Able to Return to Prior Arrangements yes   Is patient able to care for self after discharge? Yes   Patient's perception of discharge disposition home health   Readmission Within the Last 30 Days no previous admission in last 30 days   Patient currently being followed by outpatient case management? No   Patient currently receives any other outside agency services? No   Equipment Currently Used at Home walker, rolling;bedside commode;hip kit;shower chair   Do you have any problems affording any of your prescribed medications? No   Is the patient taking medications as prescribed? yes   Does the patient have transportation home? Yes   Transportation Anticipated family or friend will provide   Does the patient receive services at the [facility]? No   Discharge Plan A Home   DME Needed Upon Discharge  none   Patient/Family in Agreement with Plan yes

## 2020-11-05 NOTE — PLAN OF CARE
Pt post op total hip revision today.  Left hip dressing CDI.  Ice pack to hip.  Neurovascular checks intact.  Pain controlled with po pain meds. Boy PT/OT well.  Sat in chair for about 2 hours. Proctor draining clear yellow urine. SCDs in use.  Remains free from injury/falls.  Assisted with repositioning for comfort.  Reported BM this am prior to surgery.  SR up.  Bed low.  Bed alarm applied.  Purposeful rounding completed throughout shift.  Post op teaching continues.

## 2020-11-05 NOTE — OP NOTE
DATE OF PROCEDURE: 11/05/2020     CHIEF COMPLAINT AND PRESENT ILLNESS:   80-year-old multiple orthopedic problems had a right hip replacement years ago has done well she also has a back fusion.  Chronic pain.  Severe pain decreased rotation left hip end-stage arthritic changes consequently elected for left hip replacement hopefully same success as we had on the right     PREOPERATIVE DIAGNOSIS:  Degenerative joint disease left hip     POSTOPERATIVE DIAGNOSIS:   same     PROCEDURES PERFORMED:   left hip replacement Biomet taper lock 9 standard stem -628 head 48 cup    SURGEON:  Isaiah Mccormick M.D.     ASSISTANT:  Raquel Vega CST.     COMPLICATIONS:   none     ANESTHESIA:  General     BLOOD LOSS:   200     IMPLANTS:  As above     PROCEDURE IN DETAIL:   patient brought the operating room and general anesthesia without difficulty placed supine position with a bump beneath the sacrum and the left hip was prepped in usual fashion.  Using posterior curved lateral incision sharp dissection made down to the ITB band and gluteus jax fascia.  This was split in the usual fashion Bentley approach was performed split the 1/3 gluteus medius and vastus lateralis off the femur.  It should be noted that heard gluteus medius was already tear in off of their crescent-shaped type tear of the abductor was mature.  This was further peeled off and debrided for subsequent repair.  The hip was dislocated atraumatically.  Standard neck cut was performed.  Careful placed in the stab retractors the foveal contents removed progressive reaming up to 48 which had excellent central and peripheral fixation.  Forty-eight cup was then placed 10° anteversion 40° of horizontal.  Twenty-eight liner placed.  Attention turned to the femur with a cookie cutter then straight reaming and broaching up to a 9 which had no subsidence excellent fixation with a 9 stem -6 showed leg lengths equal excellent range of motion stability final components were  placed again excellent range of motion stability leg lengths equal.  Ortho cord was using the abductor muscular to repair that chronic tear.  At the trochanter.  Also the exposure.  1.  Vicryl on the rest of the vastus lateralis and abductor musculature.  Number Vicryl in ITB band number Vicryl in the fatty layer to 0 Vicryl subcutaneously staples on the skin Exparel tranexamic acid Marcaine were instilled the soft tissues placed in bulky sterile bandage brought to come sterile fashion leg lengths equal    This was performed with a poor recognition system

## 2020-11-05 NOTE — INTERVAL H&P NOTE
The patient has been examined and the H&P has been reviewed:    I concur with the findings and no changes have occurred since H&P was written.    Surgery risks, benefits and alternative options discussed and understood by patient/family.          Active Hospital Problems    Diagnosis  POA    OA (osteoarthritis) of hip [M16.9]  Yes      Resolved Hospital Problems   No resolved problems to display.

## 2020-11-05 NOTE — PT/OT/SLP EVAL
"Physical Therapy Evaluation and Treatment    Patient Name:  Jayne You   MRN:  [id number]    Recommendations:     Discharge Recommendations:  home health PT, home health OT(initial 24/7 assistance)   Discharge Equipment Recommendations: none(has RW and BSC)   Barriers to discharge: Inaccessible home    Assessment:     Jayne You is a 80 y.o. female admitted with a medical diagnosis of OA (osteoarthritis) of hip.  She presents with the following impairments/functional limitations:  weakness, impaired endurance, impaired self care skills, impaired functional mobilty, gait instability, impaired balance, decreased coordination, decreased lower extremity function, pain, decreased ROM, impaired skin, impaired cardiopulmonary response to activity, orthopedic precautions.    Patient evaluated by PT and goals established. Patient with minimal to moderate pain during therapy session. Review of anterior/dumont hip precautions performed with verbal and visual demo with pt requiring occasional cueing for adherence during OOB mobility. Bed mobility with Naheed, sit<>stand with CGA with RW, and amb x 25 ft with RW and Naheed progressing to CGA - limited in further gait due to decreased endurance. PT will continue to follow and progress as tolerated. Rec for d/c to home with HH PT/OT and with initial 24/7 assistance.     Rehab Prognosis: Good; patient would benefit from acute skilled PT services to address these deficits and reach maximum level of function.    Recent Surgery: Procedure(s) (LRB):  ARTHROPLASTY, HIP (Left) Day of Surgery    Plan:     During this hospitalization, patient to be seen BID(QD-BID) to address the identified rehab impairments via gait training, therapeutic activities, therapeutic exercises and progress toward the following goals:    · Plan of Care Expires:  11/12/20    Subjective     Chief Complaint: "You're really pushing me"  Patient/Family Comments/goals: Goal to be able to walk without pain and " without her rollator; Patient agreeable to evaluation.  Pain/Comfort:  · Pain Rating 1: (not rated, reports has not yet woken up)  · Location - Side 1: Left  · Location 1: hip  · Pain Addressed 1: Pre-medicate for activity, Reposition, Distraction, Cessation of Activity  · Pain Rating Post-Intervention 1: (appears comfortable at rest; grimacing noted with SAQs and WB)    Patients cultural, spiritual, Advent conflicts given the current situation: no    Living Environment:  · Pt lives alone in a single story home with no steps to enter.  · Pt has a walk in shower with a regular toilet.   · Upon discharge, patient will have assistance from her daughter.  Prior level of function:  · Ambulation: Mod(I) with use of rollator, lately has had limited community mobility due to pandemic  · ADL's: Mod(I)  · IADLs: Mod(I) - cooks, cleans, drives  · Equipment used at home: rollator, shower chair.  DME owned (not currently used): rolling walker, single point cane and bedside commode.      Objective:     Communicated with BRITNEY Burrows prior to session.  Patient found HOB elevated with peripheral IV, oxygen, espinoza catheter, FCD, hip abduction pillow  upon PT entry to room.    General Precautions: Standard, fall, diabetic, respiratory   Orthopedic Precautions:LLE anterior precautions, LLE weight bearing as tolerated   Braces: N/A     Patient donned yellow socks and gait belt for OOB mobility.    Exams:  · Cognition:   · Patient is oriented to name, , date, place, situation.  · Pt follows approximately 100% of one step commands.    · Mood: Pleasant and cooperative.   · Musculoskeletal:  · Posture: Protective guarding surgical joint  · LE ROM/Strength: 5/5 bilateral ankle dorsiflexion and plantarflexion, nonsurgical hip flexion and extension, and non surgical knee flexion and extension. Surgical hip flexion/extension and knee flexion/ext grossly 3+/5 but formal MMT deferred at this time. AROM surgical hip flexion limited to 100  degrees. AROM nonsurgical extremity WFL.  · Neuromuscular:  · Sensation: Intact to light touch bilateral LEs. Pt denied paresthesias.   · Coordination/Tone/Reflexes: No impairments identified with functional mobility. No formal testing performed.   · Balance: CGA for dynamic standing with bilateral UE support.   · Visual-vestibular: No impairments identified with functional mobility. No formal testing performed.  · Integument:  Visible skin intact and surgical extremity dressing clean and dry.   · Cardiopulmonary:  · Vital signs:  · Pre(HOB elevated, 2 L NC): HR 80 SpO2 98%  · During (HOB elevated, RA): HR 81 SpO2 93%  · Post (seated, RA): HR 85 SpO2 89-91%  · Returned to NC with extension tubing for 1 L, SpO2 increased to 95%  · Edema: Mild surgical extremity.    · Color/temperature/pulses: Equal      Functional Mobility:  · Bed Mobility:     · Supine to Sit: minimum assistance  · Transfers:     · Sit to Stand:  contact guard assistance with rolling walker  · Gait: x25 ft with RW and Naheed progressing to CGA.   · Initial gait notable for severely antalgic gait with decreased stance time of LLE and decreased stride length of RLE, pt reports more due to feeling of L leg weakness than pain  · Within 15 ft, able to progress to more equal stride lengths with decreased antalgic appearance  · Pt reports decreased endurance and requesting to defer further gait training    Therapeutic Activities and Exercises:   Pt performed supine therapeutic exercises including ankle pumps, quad sets, glute sets, SAQs (AAROM) x 10 reps with verbal and tactile cues.     PT educated patient and daughter re:   · PT plan of care/role of PT  · Safety with OOB mobility  · Use of RW  · Positioning of LE and use of ice  · Orthopedic precautions  · Gait deviations  · Therapeutic exercises  · Discharge disposition    · Pt and daughter verbalized understanding       AM-PAC 6 CLICK MOBILITY  Total Score:17     Patient left up in chair with all lines  intact, call button in reach, RN Stormy notified, daughter present and NC donned.    GOALS:   Multidisciplinary Problems     Physical Therapy Goals        Problem: Physical Therapy Goal    Goal Priority Disciplines Outcome Goal Variances Interventions   Physical Therapy Goal     PT, PT/OT Ongoing, Progressing     Description: Goals to be met by: 20     Patient will increase functional independence with mobility by performin. Supine to sit with supervision.   2. Sit to supine with supervision.   3. Sit<>stand transfer with supervision using rolling walker.   4. Gait > 150 feet with SBA using rolling walker.   5. Ascend/descend 1 threshold stairs with no handrails with CGA and with AD.  6. Verbalize anterior/dumont hip precautions without verbal cues.                   History:     Past Medical History:   Diagnosis Date    Arthritis     Cardiomyopathy     Depression     Diabetes mellitus     Hypertension     Lipids abnormal     Lumbar herniated disc     Obesity     RBBB     Sleep apnea        Past Surgical History:   Procedure Laterality Date    APPENDECTOMY      BACK SURGERY  ,,2017    BREAST SURGERY Bilateral     reduction    EYE SURGERY Bilateral     JOINT REPLACEMENT Right 2006    hip    TOTAL HIP ARTHROPLASTY Right 2006    TOTAL KNEE ARTHROPLASTY Left 2019    Procedure: ARTHROPLASTY, KNEE, TOTAL;  Surgeon: Isaiah Mccormick MD;  Location: [facility];  Service: Orthopedics;  Laterality: Left;       Time Tracking:     PT Received On: 20  PT Start Time: 1144     PT Stop Time: 1216  PT Total Time (min): 32 min     Billable Minutes: Evaluation 15 and Gait Training 17      Cat Velazquez, PT  2020

## 2020-11-05 NOTE — NURSING
Received pt to room 382 per bed in satisfactory condition.  VS as charted.  Denies discomfort at present time.  Left hip dressing clean, dry and intact.  Ice pack applied to hip.  Abduction pillow in place.  Neurovascular checks intact to BLE's.  Proctor draining clear yellow urine.  SCDs applied.  Post op teaching began.  Instructed to call for assist to get OOB.  Voiced understanding.  Bed alarm applied.  Daughter at bedside.

## 2020-11-06 VITALS
HEIGHT: 64 IN | SYSTOLIC BLOOD PRESSURE: 122 MMHG | TEMPERATURE: 98 F | WEIGHT: 229 LBS | OXYGEN SATURATION: 95 % | DIASTOLIC BLOOD PRESSURE: 56 MMHG | RESPIRATION RATE: 18 BRPM | HEART RATE: 88 BPM | BODY MASS INDEX: 39.09 KG/M2

## 2020-11-06 PROBLEM — M16.9 OA (OSTEOARTHRITIS) OF HIP: Status: RESOLVED | Noted: 2020-11-05 | Resolved: 2020-11-06

## 2020-11-06 LAB
ANION GAP SERPL CALC-SCNC: 7 MMOL/L (ref 8–16)
BASOPHILS # BLD AUTO: 0.02 K/UL (ref 0–0.2)
BASOPHILS NFR BLD: 0.2 % (ref 0–1.9)
BUN SERPL-MCNC: 16 MG/DL (ref 8–23)
CALCIUM SERPL-MCNC: 8.6 MG/DL (ref 8.7–10.5)
CHLORIDE SERPL-SCNC: 102 MMOL/L (ref 95–110)
CO2 SERPL-SCNC: 29 MMOL/L (ref 23–29)
CREAT SERPL-MCNC: 1.1 MG/DL (ref 0.5–1.4)
DIFFERENTIAL METHOD: ABNORMAL
EOSINOPHIL # BLD AUTO: 0.1 K/UL (ref 0–0.5)
EOSINOPHIL NFR BLD: 1.5 % (ref 0–8)
ERYTHROCYTE [DISTWIDTH] IN BLOOD BY AUTOMATED COUNT: 14.6 % (ref 11.5–14.5)
EST. GFR  (AFRICAN AMERICAN): 56 ML/MIN/1.73 M^2
EST. GFR  (NON AFRICAN AMERICAN): 48 ML/MIN/1.73 M^2
GLUCOSE SERPL-MCNC: 190 MG/DL (ref 70–110)
HCT VFR BLD AUTO: 40.7 % (ref 37–48.5)
HGB BLD-MCNC: 11.8 G/DL (ref 12–16)
IMM GRANULOCYTES # BLD AUTO: 0.03 K/UL (ref 0–0.04)
IMM GRANULOCYTES NFR BLD AUTO: 0.3 % (ref 0–0.5)
LYMPHOCYTES # BLD AUTO: 1.2 K/UL (ref 1–4.8)
LYMPHOCYTES NFR BLD: 12.6 % (ref 18–48)
MCH RBC QN AUTO: 25 PG (ref 27–31)
MCHC RBC AUTO-ENTMCNC: 29 G/DL (ref 32–36)
MCV RBC AUTO: 86 FL (ref 82–98)
MONOCYTES # BLD AUTO: 0.9 K/UL (ref 0.3–1)
MONOCYTES NFR BLD: 9.1 % (ref 4–15)
NEUTROPHILS # BLD AUTO: 7.3 K/UL (ref 1.8–7.7)
NEUTROPHILS NFR BLD: 76.3 % (ref 38–73)
NRBC BLD-RTO: 0 /100 WBC
PLATELET # BLD AUTO: 180 K/UL (ref 150–350)
PMV BLD AUTO: 11.8 FL (ref 9.2–12.9)
POCT GLUCOSE: 238 MG/DL (ref 70–110)
POTASSIUM SERPL-SCNC: 4.6 MMOL/L (ref 3.5–5.1)
RBC # BLD AUTO: 4.72 M/UL (ref 4–5.4)
SODIUM SERPL-SCNC: 138 MMOL/L (ref 136–145)
WBC # BLD AUTO: 9.5 K/UL (ref 3.9–12.7)

## 2020-11-06 PROCEDURE — 85025 COMPLETE CBC W/AUTO DIFF WBC: CPT

## 2020-11-06 PROCEDURE — 36415 COLL VENOUS BLD VENIPUNCTURE: CPT

## 2020-11-06 PROCEDURE — 63600175 PHARM REV CODE 636 W HCPCS: Performed by: ORTHOPAEDIC SURGERY

## 2020-11-06 PROCEDURE — 97110 THERAPEUTIC EXERCISES: CPT | Mod: CQ

## 2020-11-06 PROCEDURE — 97535 SELF CARE MNGMENT TRAINING: CPT

## 2020-11-06 PROCEDURE — 97116 GAIT TRAINING THERAPY: CPT | Mod: CQ

## 2020-11-06 PROCEDURE — 94761 N-INVAS EAR/PLS OXIMETRY MLT: CPT

## 2020-11-06 PROCEDURE — 80048 BASIC METABOLIC PNL TOTAL CA: CPT

## 2020-11-06 PROCEDURE — 25000003 PHARM REV CODE 250: Performed by: ORTHOPAEDIC SURGERY

## 2020-11-06 PROCEDURE — 97165 OT EVAL LOW COMPLEX 30 MIN: CPT

## 2020-11-06 RX ORDER — HYDROCODONE BITARTRATE AND ACETAMINOPHEN 10; 325 MG/1; MG/1
1 TABLET ORAL EVERY 6 HOURS PRN
Qty: 50 TABLET | Refills: 0 | Status: ON HOLD | OUTPATIENT
Start: 2020-11-06 | End: 2020-12-03 | Stop reason: SDUPTHER

## 2020-11-06 RX ADMIN — CEFAZOLIN SODIUM 2 G: 2 SOLUTION INTRAVENOUS at 06:11

## 2020-11-06 RX ADMIN — POLYETHYLENE GLYCOL 3350 17 G: 17 POWDER, FOR SOLUTION ORAL at 09:11

## 2020-11-06 RX ADMIN — FAMOTIDINE 20 MG: 20 TABLET ORAL at 09:11

## 2020-11-06 RX ADMIN — BACLOFEN 10 MG: 10 TABLET ORAL at 09:11

## 2020-11-06 RX ADMIN — CELECOXIB 200 MG: 200 CAPSULE ORAL at 09:11

## 2020-11-06 RX ADMIN — HYDROCODONE BITARTRATE AND ACETAMINOPHEN 1 TABLET: 10; 325 TABLET ORAL at 09:11

## 2020-11-06 RX ADMIN — DULOXETINE HYDROCHLORIDE 30 MG: 30 CAPSULE, DELAYED RELEASE ORAL at 09:11

## 2020-11-06 RX ADMIN — ASPIRIN 81 MG: 81 TABLET, CHEWABLE ORAL at 09:11

## 2020-11-06 RX ADMIN — INSULIN ASPART 2 UNITS: 100 INJECTION, SOLUTION INTRAVENOUS; SUBCUTANEOUS at 12:11

## 2020-11-06 RX ADMIN — GLIPIZIDE 5 MG: 5 TABLET, FILM COATED, EXTENDED RELEASE ORAL at 08:11

## 2020-11-06 RX ADMIN — HYDROCODONE BITARTRATE AND ACETAMINOPHEN 1 TABLET: 10; 325 TABLET ORAL at 05:11

## 2020-11-06 NOTE — PLAN OF CARE
Patient is discharged home with [facility]. Accepted by [facility]. AVS updated.     No DME needs for discharge.     Family to transport home.        11/06/20 1007   Final Note   Assessment Type Final Discharge Note   Anticipated Discharge Disposition Home-Health   What phone number can be called within the next 1-3 days to see how you are doing after discharge? [phone]

## 2020-11-06 NOTE — PT/OT/SLP EVAL
Occupational Therapy   Evaluation & Treatment    Name: Jayne You  MRN: [id number]  Admitting Diagnosis:  OA (osteoarthritis) of hip 1 Day Post-Op    Recommendations:     Discharge Recommendations: home health PT, home health OT  Discharge Equipment Recommendations:  none(pt owns necessary equipment)  Barriers to discharge:  None    Assessment:     Jayne You is a 80 y.o. female with a medical diagnosis of OA (osteoarthritis) of hip s/p L UNIQUE.  She presents with pain in L hip. Performance deficits affecting function: impaired endurance, impaired self care skills, impaired functional mobilty, decreased lower extremity function, impaired cardiopulmonary response to activity, decreased ROM.  Pt agreeable to participating in therapy upon arrival to room.  Pt demonstrates strength and ROM in (B) UE needed for ADLs that is WNL, and is able to perform sit <> stand transfer with SBA and RW.  Pt able to perform LB dressing with SBA-Min A, and grooming while standing at sink with SBA and RW.  Overall, pt tolerated therapy well and displayed good understanding of anterior hip precautions during ADLs and functional mobility.    PTA pt reports being Mod I with ADLs, iADLs, and mobility.  Pt has had previous knee and hip replacement and is familiar with healing process.  Pt would benefit from skilled OT services to address problems listed below and maximize independence with ADLs.  Home health is recommended upon d/c from acute care to further address deficits and help pt improve overall functional independence.         Rehab Prognosis: Good; patient would benefit from acute skilled OT services to address these deficits and reach maximum level of function.       Plan:     Patient to be seen daily to address the above listed problems via self-care/home management, therapeutic activities, therapeutic exercises  · Plan of Care Expires: 12/06/20  · Plan of Care Reviewed with: patient    Subjective     Chief Complaint: Pain in L  hip  Patient/Family Comments/goals: Return home; resume PLOF    Occupational Profile:  Living Environment: Pt lives alone in [facility], [address].  Bathroom has walk-in shower with raised toilet.    Previous level of function: Pt reports being Mod I with ADLs, iADLS, and mobility with use of rollator as needed.    Roles and Routines: Mother, drives, states activities has been limited 2* covid  Equipment Used at Home:  shower chair, rollator.  Also has RW, SPC, and BSC but not currently using.  Assistance upon Discharge: Daughter able to provide assist    Pain/Comfort:  · Pain Rating 1: 8/10  · Location - Side 1: Left  · Location - Orientation 1: generalized  · Location 1: hip  · Pain Addressed 1: Pre-medicate for activity  · Pain Rating Post-Intervention 1: 8/10  · Location - Side 2: Left  · Location - Orientation 2: generalized  · Location 2: hip    Patients cultural, spiritual, Sabianism conflicts given the current situation: no    Objective:     Communicated with: RN (Cheri) prior to session.  Patient found up in chair with telemetry, ice pack on L hip, and peripheral IV, upon OT entry to room.    General Precautions: Standard, fall, diabetic, respiratory   Orthopedic Precautions:LLE weight bearing as tolerated, LLE anterior precautions   Braces: N/A     Occupational Performance:    Bed Mobility:    · Not assessed 2* pt up in chair upon arrival    Functional Mobility/Transfers:  · Sit <> Stand:  SBA and RW x 1 trial from chair  · Functional Mobility: Pt ambulated ~30 ft in room with SBA and RW.  No instances of LOB noted.    Activities of Daily Living:  · Grooming: stand by assistance for washing hands while standing at sink with RW.  · Upper Body Dressing: Independent for [facility] gown and donning dress while seated up in chair.  · Lower Body Dressing:   · -SBA for donning underwear.  Pt started task in seated position then stood to pull up.  · -Min A for donning shoes.  Pt able to place feet in shoes, but required  some assist to pull strap around heel in back on both feet.    Cognitive/Visual Perceptual:  Cognitive/Psychosocial Skills:    -       Oriented to: Person, Place, Time and Situation   -       Follows Commands/attention:Follows multistep  commands  -       Communication: clear/fluent  -       Memory: No Deficits noted  -       Safety awareness/insight to disability: intact   -       Mood/Affect/Coping skills/emotional control: Appropriate to situation    Physical Exam:  Postural examination/scapula alignment:    -       No postural abnormalities identified  Skin integrity: Visible skin intact  Edema:  Mild L hip  Sensation: -       Intact  Motor Planning: -       WFL  Dominant hand: -       right  Upper Extremity Range of Motion:  As observed through functional tasks  -       Right Upper Extremity: WNL  -       Left Upper Extremity: WNL  Upper Extremity Strength:  As observed through functional tasks  -       Right Upper Extremity: WNL  -       Left Upper Extremity: WNL   Strength: WNL  Fine Motor Coordination: Intact  Gross motor coordination: WFL  Balance:  Sitting- I, Standing- SBA  Vision: intact    AMPAC 6 Click ADL:  AMPAC Total Score: 20    Treatment & Education:  Education:  *Pt educated on:   -role of OT in acute care setting  -orthopedic precautions: anterior hip  -LB dressing technique  -transfer technique from EOB, chair, and BSC  -importance of facing activity during ADLs/IADLs to prevent rotation of hip  *Pt ambulated within room to address coordination, endurance, and balance needed for functional mobility  *Pt performed LB dressing as noted above.  Cues provided to help prevent internal/external rotation of feet.  *Pt performed grooming task while standing at sink  *Anterior hip precautions and POC reviewed with pt    Patient left up in chair in reclined position with ice pack on L hip, call button in reach and RN (Cheri) notified.  Tray table placed within reach.    GOALS:   Multidisciplinary  Problems     Occupational Therapy Goals        Problem: Occupational Therapy Goal    Goal Priority Disciplines Outcome Interventions   Occupational Therapy Goal     OT, PT/OT Ongoing, Progressing    Description: Goals to be met by: 11/13/2020     Patient will increase functional independence with ADLs by performing:    LE Dressing with Stand-by Assistance.  Grooming while standing at sink with Supervision.  Toileting from bedside commode with Supervision for hygiene and clothing management.   Toilet transfer to bedside commode with Supervision.  Pt will identify anterior hip precautions via teach back method.                     History:     Past Medical History:   Diagnosis Date    Arthritis     Cardiomyopathy     Depression     Diabetes mellitus     Hypertension     Lipids abnormal     Lumbar herniated disc     Obesity     RBBB     Sleep apnea        Past Surgical History:   Procedure Laterality Date    APPENDECTOMY      BACK SURGERY  2014,2016,2017    BREAST SURGERY Bilateral     reduction    EYE SURGERY Bilateral     HIP ARTHROPLASTY Left 11/5/2020    Procedure: ARTHROPLASTY, HIP;  Surgeon: Isaiah Mccormick MD;  Location: [facility];  Service: Orthopedics;  Laterality: Left;    JOINT REPLACEMENT Right 2006    hip    TOTAL HIP ARTHROPLASTY Right 2006    TOTAL KNEE ARTHROPLASTY Left 7/18/2019    Procedure: ARTHROPLASTY, KNEE, TOTAL;  Surgeon: Isaiah Mccormick MD;  Location: [facility];  Service: Orthopedics;  Laterality: Left;       Time Tracking:     OT Date of Treatment: 11/06/20  OT Start Time: 1007  OT Stop Time: 1028  OT Total Time (min): 21 min    Billable Minutes:Evaluation 13  Self Care/Home Management 8    NELLA Gooden  11/6/2020

## 2020-11-06 NOTE — DISCHARGE SUMMARY
[facility]  Discharge Summary      Admit Date: 11/5/2020    Discharge Date and Time: No discharge date for patient encounter.    Attending Physician: Isaiah Mccormick MD     Reason for Admission: oa left hip    Procedures Performed: Procedure(s) (LRB):  ARTHROPLASTY, HIP (Left)    Hospital Course (synopsis of major diagnoses, care, treatment, and services provided during the course of the hospital stay): The above patient underwent the above procedure.  Post operative the patient received pain management and pt / ot. The patient progressed well and will be discharged--see orders.     Consults: nephrology    Significant Diagnostic Studies: Labs: All labs within the past 24 hours have been reviewed    Final Diagnoses:    Principal Problem: OA (osteoarthritis) of hip   Secondary Diagnoses:   Active Hospital Problems   No active problems to display.      Resolved Hospital Problems    Diagnosis Date Resolved POA    *OA (osteoarthritis) of hip [M16.9] 11/06/2020 Yes    OA (osteoarthritis) of hip [M16.9] 11/05/2020 Yes       Discharged Condition: good    Disposition: Home-Health Care [facility]    Follow Up/Patient Instructions:     Medications:  Reconciled Home Medications:      Medication List      START taking these medications    HYDROcodone-acetaminophen  mg per tablet  Commonly known as: NORCO  Take 1 tablet by mouth every 6 (six) hours as needed.        CONTINUE taking these medications    amLODIPine 5 MG tablet  Commonly known as: NORVASC  Take 10 mg by mouth once daily.     aspirin 81 MG Chew  Take 1 tablet (81 mg total) by mouth 2 (two) times daily.     baclofen 10 MG tablet  Commonly known as: LIORESAL  Take 10 mg by mouth 2 (two) times daily.     DULoxetine 30 MG capsule  Commonly known as: CYMBALTA  Take 30 mg by mouth once daily.     ergocalciferol 50,000 unit Cap  Commonly known as: ERGOCALCIFEROL  Take 50,000 Units by mouth every 7 days. Mondays     furosemide 20 MG tablet  Commonly  "known as: LASIX  Take 20 mg by mouth as needed. Takes when feet swell     glipiZIDE 5 MG Tr24  Commonly known as: GLUCOTROL  Take 5 mg by mouth daily with breakfast.     lisinopriL 40 MG tablet  Commonly known as: PRINIVIL,ZESTRIL  Take 40 mg by mouth once daily.     zolpidem 10 mg Tab  Commonly known as: AMBIEN  Take 5 mg by mouth nightly as needed.        STOP taking these medications    buprenorphine  mcg Film  Commonly known as: BELBUCA          Discharge Procedure Orders   WALKER FOR HOME USE     Order Specific Question Answer Comments   Type of Walker: Adult (5'4"-6'6")    With wheels? Yes    Height: 5' 4" (1.626 m)    Weight: 103.9 kg (229 lb)    Length of need (1-99 months): 99    Does patient have medical equipment at home? bedside commode    Does patient have medical equipment at home? walker, rolling    Does patient have medical equipment at home? hip kit    Does patient have medical equipment at home? shower chair    Please check all that apply: Patient is unable to safely ambulate without equipment.      Follow-up Information     Please follow up.    Why: AS SCHEDULED , Call [phone] to reach Dr. Mccormick               "

## 2020-11-06 NOTE — PLAN OF CARE
Problem: Occupational Therapy Goal  Goal: Occupational Therapy Goal  Description: Goals to be met by: 11/13/2020     Patient will increase functional independence with ADLs by performing:    LE Dressing with Stand-by Assistance.  Grooming while standing at sink with Supervision.  Toileting from bedside commode with Supervision for hygiene and clothing management.   Toilet transfer to bedside commode with Supervision.  Pt will identify anterior hip precautions via teach back method.    Outcome: Ongoing, Progressing     OT evaluation complete and POC established.  Home health is recommended upon d/c from acute care to further address deficits and help pt improve overall functional independence.     NELLA Gooden  11/6/2020

## 2020-11-06 NOTE — PROGRESS NOTES
"MED  Progress Note    Admit Date: 11/5/2020   LOS: 0 days     SUBJECTIVE:     Follow-up For:  OA (osteoarthritis) of hip    Interval History:     Walking halls with PT this am.  No CP/SOB/Calf pain.     Review of Systems:  Constitutional: No fever or chills  Respiratory: No cough or shortness of breath  Cardiovascular: No chest pain or palpitations  Gastrointestinal: No nausea or vomiting  Neurological: No confusion or weakness    OBJECTIVE:     Vital Signs Range (Last 24H):  BP (!) 117/53 (BP Location: Left arm, Patient Position: Lying)   Pulse 88   Temp 98.2 °F (36.8 °C) (Oral)   Resp 18   Ht 5' 4" (1.626 m)   Wt 103.9 kg (229 lb)   SpO2 (!) 94%   Breastfeeding No   BMI 39.31 kg/m²     Temp:  [97.5 °F (36.4 °C)-98.4 °F (36.9 °C)]   Pulse:  [76-90]   Resp:  [16-20]   BP: (117-206)/()   SpO2:  [92 %-99 %]     I & O (Last 24H):    Intake/Output Summary (Last 24 hours) at 11/6/2020 0838  Last data filed at 11/6/2020 0530  Gross per 24 hour   Intake 2365 ml   Output 525 ml   Net 1840 ml       Physical Exam:  General appearance: Well developed, well nourished  Eyes:  Conjunctivae/corneas clear. PERRL.  Lungs: Normal respiratory effort,   clear to auscultation bilaterally   Heart: Regular rate and rhythm, S1, S2 normal, no murmur, rub or anna.  Abdomen: Soft, non-tender non-distended; bowel sounds normal; no masses,  no organomegaly, obese, massive pannus  Extremities: No cyanosis or clubbing. No edema.    Skin: Skin color, texture, turgor normal. No rashes or lesions  Neurologic: Normal strength and tone. No focal numbness or weakness   Left hip CDI    Laboratory Data:  Recent Labs   Lab 11/06/20  0350   WBC 9.50   RBC 4.72   HGB 11.8*   HCT 40.7      MCV 86   MCH 25.0*   MCHC 29.0*       BMP:   Recent Labs   Lab 11/06/20  0350   *      K 4.6      CO2 29   BUN 16   CREATININE 1.1   CALCIUM 8.6*     Lab Results   Component Value Date    CALCIUM 8.6 (L) 11/06/2020       Lab " Results   Component Value Date    CALCIUM 8.6 (L) 11/06/2020       No results found for: URICACID    BNP  No results for input(s): BNP, BNPTRIAGEBLO in the last 168 hours.    Medications:  Medication list was reviewed and changes noted under Assessment/Plan.    Diagnostic Results:        ASSESSMENT/PLAN:     1. Left hip (M16.9):  Per ortho/therapy teams.  2. HTN (I10):  Home meds.  Control pain.  Hydralazine PRN.  Hold parameters as BP much improved.  3. DM (E11.65):  Oral meds reordered/ADA/SSI.  Recommend GLP by PCP for weight loss.  Advise against glipizide in age group.    4. Obese:  As above  5. KHAI (G47.33):  CPAP ordered.  6. Cardiomyopathy:  Placed on tele w/o events  7. DVT :  ASA BID.      Ok to DC.

## 2020-11-06 NOTE — NURSING
11/06/2020      Pt verbalized understanding D/C instructions and education provided pertinent to D/C needs. IV cath removed fully intact. No distress noted. Pt awaiting transport for discharge.           Bobbi Goss RN

## 2020-11-06 NOTE — PLAN OF CARE
Patient received on 1L nc. Sats 96%. Cpap qhs, settings as  documented. Pt. in no distress, will continue to monitor.

## 2020-11-06 NOTE — PLAN OF CARE
Pt free of trauma, falls, and injury. Pt VSS and afebrile throughout shift. Pt free of skin breakdown. Pt dressing is clean, dry, and intact. Pt pain has been moderately controlled by PO and IV pain meds and tolerated well. Pt has call light in reach, bed alarm on, bed brakes on, side rails up x2, bed in low position, TEDs/SCDs on, IS at bedside, and nonskid socks on. Pt lying in bed in no distress. Will continue to monitor until end of shift.

## 2020-11-06 NOTE — PLAN OF CARE
Problem: Physical Therapy Goal  Goal: Physical Therapy Goal  Description: Goals to be met by: 20     Patient will increase functional independence with mobility by performin. Supine to sit with supervision.   2. Sit to supine with supervision.   3. Sit<>stand transfer with supervision using rolling walker.   4. Gait > 150 feet with SBA using rolling walker.   5. Ascend/descend 1 threshold stairs with no handrails with CGA and with AD.  6. Verbalize anterior/dumont hip precautions without verbal cues.  Outcome: Ongoing, Progressing   Pt presents with HOB elevated and consents to therapy today. The Pt performed the following activities:  Sit<>Stand using RW and CGA with bed in lowered position.  Gait x 100 ft using RW and CGA in hallway.

## 2020-11-06 NOTE — PT/OT/SLP PROGRESS
Physical Therapy Treatment    Patient Name:  Jayne Yuo   MRN:  [id number]    Recommendations:     Discharge Recommendations:  home health PT, home health OT   Discharge Equipment Recommendations: none   Barriers to discharge: Inaccessible home    Assessment:     Jayne You is a 80 y.o. female admitted with a medical diagnosis of OA (osteoarthritis) of hip.  She presents with the following impairments/functional limitations:  weakness, impaired endurance, impaired self care skills, impaired functional mobilty, gait instability, impaired balance, decreased coordination, decreased lower extremity function, decreased safety awareness, decreased ROM, impaired cardiopulmonary response to activity, orthopedic precautions Pt showing improvement with functional mobility of her left Hip. Pt able to increase her distance ambulated.     Rehab Prognosis: Good; patient would benefit from acute skilled PT services to address these deficits and reach maximum level of function.    Recent Surgery: Procedure(s) (LRB):  ARTHROPLASTY, HIP (Left) 1 Day Post-Op    Plan:     During this hospitalization, patient to be seen BID to address the identified rehab impairments via gait training, therapeutic activities, therapeutic exercises and progress toward the following goals:    · Plan of Care Expires:  11/12/20    Subjective     Chief Complaint: None stated  Patient/Family Comments/goals: None stated  Pain/Comfort:  · Pain Rating 1: 7/10  · Location - Side 1: Left  · Location - Orientation 1: generalized  · Location 1: hip  · Pain Addressed 1: Pre-medicate for activity, Reposition, Distraction  · Pain Rating Post-Intervention 1: 7/10      Objective:     Communicated with NS prior to session.  Patient found HOB elevated with cryotherapy, FCD, espinoza catheter, hip abduction pillow, oxygen upon PT entry to room.     General Precautions: Standard, fall, diabetic, respiratory   Orthopedic Precautions:LLE weight bearing as tolerated, LLE  anterior precautions   Braces: N/A     Functional Mobility:  · Transfers:     · Sit to Stand:  contact guard assistance with rolling walker  · Gait: 100 ft using RW and CGA      AM-PAC 6 CLICK MOBILITY  Turning over in bed (including adjusting bedclothes, sheets and blankets)?: 3  Sitting down on and standing up from a chair with arms (e.g., wheelchair, bedside commode, etc.): 3  Moving from lying on back to sitting on the side of the bed?: 3  Moving to and from a bed to a chair (including a wheelchair)?: 3  Need to walk in hospital room?: 3  Climbing 3-5 steps with a railing?: 2  Basic Mobility Total Score: 17       Therapeutic Activities and Exercises:   The Pt performed the following activities while in bedside chair:  AP, LAQ, GS x 10ea     Patient left up in chair with all lines intact, call button in reach and NS notified..    GOALS:   Multidisciplinary Problems     Physical Therapy Goals        Problem: Physical Therapy Goal    Goal Priority Disciplines Outcome Goal Variances Interventions   Physical Therapy Goal     PT, PT/OT Ongoing, Progressing     Description: Goals to be met by: 20     Patient will increase functional independence with mobility by performin. Supine to sit with supervision.   2. Sit to supine with supervision.   3. Sit<>stand transfer with supervision using rolling walker.   4. Gait > 150 feet with SBA using rolling walker.   5. Ascend/descend 1 threshold stairs with no handrails with CGA and with AD.  6. Verbalize anterior/dumont hip precautions without verbal cues.                   Time Tracking:     PT Received On: 20  PT Start Time: 730     PT Stop Time: 08  PT Total Time (min): 39 min     Billable Minutes: Gait Training 25 and Therapeutic Exercise 14    Treatment Type: Treatment  PT/PTA: PTA     PTA Visit Number: 2     CASH Cuenca  2020

## 2020-11-06 NOTE — PLAN OF CARE
Patient was not available.   left a business card at patient's bedside and will follow up with patient.

## 2020-11-13 LAB
COMMENT: NORMAL
FINAL PATHOLOGIC DIAGNOSIS: NORMAL
GROSS: NORMAL
Lab: NORMAL

## 2020-11-15 ENCOUNTER — HOSPITAL ENCOUNTER (EMERGENCY)
Facility: OTHER | Age: 80
Discharge: HOME | End: 2020-11-16
Attending: EMERGENCY MEDICINE
Payer: MEDICARE

## 2020-11-15 DIAGNOSIS — S73.015A: Primary | ICD-10-CM

## 2020-11-15 DIAGNOSIS — M25.569 KNEE PAIN: ICD-10-CM

## 2020-11-15 DIAGNOSIS — M25.559 HIP PAIN: ICD-10-CM

## 2020-11-15 PROCEDURE — 63600175 PHARM REV CODE 636 W HCPCS: Performed by: EMERGENCY MEDICINE

## 2020-11-15 PROCEDURE — 27265 TREAT HIP DISLOCATION: CPT | Mod: LT

## 2020-11-15 PROCEDURE — 99285 EMERGENCY DEPT VISIT HI MDM: CPT | Mod: 25

## 2020-11-15 PROCEDURE — 25000003 PHARM REV CODE 250: Performed by: EMERGENCY MEDICINE

## 2020-11-15 PROCEDURE — 29505 APPLICATION LONG LEG SPLINT: CPT

## 2020-11-15 PROCEDURE — 96375 TX/PRO/DX INJ NEW DRUG ADDON: CPT | Mod: 59

## 2020-11-15 PROCEDURE — 96374 THER/PROPH/DIAG INJ IV PUSH: CPT

## 2020-11-15 RX ORDER — ETOMIDATE 2 MG/ML
10 INJECTION INTRAVENOUS
Status: DISCONTINUED | OUTPATIENT
Start: 2020-11-15 | End: 2020-11-15

## 2020-11-15 RX ORDER — HYDROCODONE BITARTRATE AND ACETAMINOPHEN 10; 325 MG/1; MG/1
1 TABLET ORAL
Status: COMPLETED | OUTPATIENT
Start: 2020-11-15 | End: 2020-11-15

## 2020-11-15 RX ORDER — FENTANYL CITRATE 50 UG/ML
INJECTION, SOLUTION INTRAMUSCULAR; INTRAVENOUS
Status: DISCONTINUED
Start: 2020-11-15 | End: 2020-11-16 | Stop reason: HOSPADM

## 2020-11-15 RX ORDER — DIAZEPAM 10 MG/2ML
INJECTION INTRAMUSCULAR
Status: DISCONTINUED
Start: 2020-11-15 | End: 2020-11-16 | Stop reason: HOSPADM

## 2020-11-15 RX ORDER — DIAZEPAM 10 MG/2ML
INJECTION INTRAMUSCULAR CODE/TRAUMA/SEDATION MEDICATION
Status: COMPLETED | OUTPATIENT
Start: 2020-11-15 | End: 2020-11-15

## 2020-11-15 RX ORDER — FENTANYL CITRATE 50 UG/ML
INJECTION, SOLUTION INTRAMUSCULAR; INTRAVENOUS CODE/TRAUMA/SEDATION MEDICATION
Status: COMPLETED | OUTPATIENT
Start: 2020-11-15 | End: 2020-11-15

## 2020-11-15 RX ORDER — FENTANYL CITRATE 50 UG/ML
50 INJECTION, SOLUTION INTRAMUSCULAR; INTRAVENOUS
Status: DISCONTINUED | OUTPATIENT
Start: 2020-11-15 | End: 2020-11-15

## 2020-11-15 RX ORDER — ETOMIDATE 2 MG/ML
INJECTION INTRAVENOUS CODE/TRAUMA/SEDATION MEDICATION
Status: COMPLETED | OUTPATIENT
Start: 2020-11-15 | End: 2020-11-15

## 2020-11-15 RX ADMIN — FENTANYL CITRATE 50 MCG: 50 INJECTION, SOLUTION INTRAMUSCULAR; INTRAVENOUS at 11:11

## 2020-11-15 RX ADMIN — ETOMIDATE 5 MG: 2 INJECTION, SOLUTION INTRAVENOUS at 11:11

## 2020-11-15 RX ADMIN — ETOMIDATE 10 MG: 2 INJECTION, SOLUTION INTRAVENOUS at 11:11

## 2020-11-15 RX ADMIN — DIAZEPAM 5 MG: 5 INJECTION, SOLUTION INTRAMUSCULAR; INTRAVENOUS at 11:11

## 2020-11-15 RX ADMIN — HYDROCODONE BITARTRATE AND ACETAMINOPHEN 1 TABLET: 10; 325 TABLET ORAL at 10:11

## 2020-11-16 VITALS
OXYGEN SATURATION: 99 % | RESPIRATION RATE: 20 BRPM | TEMPERATURE: 99 F | SYSTOLIC BLOOD PRESSURE: 154 MMHG | HEART RATE: 98 BPM | DIASTOLIC BLOOD PRESSURE: 87 MMHG

## 2020-11-16 NOTE — ED NOTES
First attempt at hip reduction unsuccessful.  MD Tyler at bedside to assist.  Providers requesting xray.

## 2020-11-16 NOTE — PROCEDURES - EMERGENCY DEPT.
ED Procedure Notes:   Pre-Assessment for Procedural Sedation    Date/Time: 11/15/2020 10:59 PM  Performed by: Cecy Lundy MD  Authorized by: Cecy Lundy MD         ASA Class::  Class 2 - Mild Illness without functional impairment.       Mallampati Score::  Class 2 - Visualization of the soft palate, fauces, and uvula.    Date/Time of last solid:  11/15/2020 10:59 AM    Patient/Family history of anesthesia or sedation complications: No    Sedation:     Sedation:  Etomidate    Analgesia:  Fentanyl    Sedation start:  11/15/2020 11:00 PM

## 2020-11-16 NOTE — ED TRIAGE NOTES
Pt presents to ED c/o Hip pain x 1 day. Pt reports having sx for hip replacement on 11/05. Pt reports walking today and hearing a pop after bending over, pt slowly sat down and has pain. Pt denies SOB, dizziness, HA.

## 2020-11-16 NOTE — ED PROVIDER NOTES
Encounter Date: 11/15/2020       History     Chief Complaint   Patient presents with    Hip Pain     Hip replaced 11/5; pain after bending over this A.M.     80-year-old female status post left hip arthroplasty on 11/05 by Dr. Mccormick presents with complaint left hip pain.  Patient states she bent forward earlier today and heard a pop from her hip.  She states she has been unable to bear weight since she heard a pop.  Patient states she has been working with PT and home since her surgery and had been able to bear weight until she heard a pop.  She last had Norco approximately 4 hr ago with some relief in her pain.  She denies any other complaints.        Review of patient's allergies indicates:   Allergen Reactions    Codeine Nausea And Vomiting     Pt reports can take percocet     Past Medical History:   Diagnosis Date    Arthritis     Cardiomyopathy     Depression     Diabetes mellitus     Hypertension     Lipids abnormal     Lumbar herniated disc     Obesity     RBBB     Sleep apnea      Past Surgical History:   Procedure Laterality Date    APPENDECTOMY      BACK SURGERY  2014,2016,2017    BREAST SURGERY Bilateral     reduction    EYE SURGERY Bilateral     HIP ARTHROPLASTY Left 11/5/2020    Procedure: ARTHROPLASTY, HIP;  Surgeon: Isaiah Mccormick MD;  Location: [facility];  Service: Orthopedics;  Laterality: Left;    JOINT REPLACEMENT Right 2006    hip    TOTAL HIP ARTHROPLASTY Right 2006    TOTAL KNEE ARTHROPLASTY Left 7/18/2019    Procedure: ARTHROPLASTY, KNEE, TOTAL;  Surgeon: Isaiah Mccormick MD;  Location: [facility];  Service: Orthopedics;  Laterality: Left;     No family history on file.  Social History     Tobacco Use    Smoking status: Never Smoker    Smokeless tobacco: Never Used   Substance Use Topics    Alcohol use: No    Drug use: Not on file     Review of Systems   Constitutional: Negative for chills, fatigue and fever.   Respiratory: Negative for cough, shortness of breath and  wheezing.    Cardiovascular: Negative for chest pain and palpitations.   Gastrointestinal: Negative for constipation, diarrhea, nausea and vomiting.   Genitourinary: Negative for dysuria.   Neurological: Negative for dizziness, weakness, numbness and headaches.       Physical Exam     Initial Vitals   BP Pulse Resp Temp SpO2   11/15/20 2146 11/15/20 2147 11/15/20 2149 11/15/20 2149 11/15/20 2147   (!) 183/84 94 18 98.2 °F (36.8 °C) 97 %      MAP       --                Physical Exam    Constitutional: She appears well-developed and well-nourished. She is not diaphoretic. No distress.   HENT:   Head: Normocephalic and atraumatic.   Right Ear: External ear normal.   Left Ear: External ear normal.   Nose: Nose normal.   Eyes: Conjunctivae are normal. Right eye exhibits no discharge. Left eye exhibits no discharge.   Neck: Neck supple.   Cardiovascular: Normal rate, regular rhythm, normal heart sounds and intact distal pulses.   Pulmonary/Chest: Breath sounds normal. No stridor. No respiratory distress. She has no wheezes. She has no rhonchi. She has no rales.   Abdominal: Soft. Bowel sounds are normal. She exhibits no distension. There is no abdominal tenderness. There is no rebound and no guarding.   Musculoskeletal:      Comments: Left leg shortening and externally rotated, dressing over left lateral hip in place, tenderness to palpation of the left hip   Neurological: She is alert and oriented to person, place, and time. GCS score is 15. GCS eye subscore is 4. GCS verbal subscore is 5. GCS motor subscore is 6.   Skin: Skin is warm and dry.         ED Course   Orthopedic Injury    Date/Time: 11/16/2020 12:01 AM  Performed by: Cecy Lundy MD  Authorized by: Cecy Lundy MD     Location procedure was performed:  [facility] EMERGENCY DEPARTMENT  Consent Done?:  Yes  Universal Protocol:     Written consent obtained?: Yes      Consent given by:  Patient  Injury:     Injury location:  Hip    Location details:  Left hip     Injury type:  Dislocation    Dislocation type: posterior      Spontaneous?: Yes      Prosthesis?: Yes        Pre-procedure assessment:     Distal perfusion: normal      Neurological function: normal            Labs Reviewed - No data to display       Imaging Results    None             Additional MDM:   Comments: 72-year-old female status post left hip arthroplasty presents complaining of pain and inability to bear weight since hearing a pop earlier today.  There is visible leg shortening and external rotation of the left lower extremity despite repositioning in bed.  Will obtain x-ray of the hip and femur and reassess.    12:00 AM  X-ray consistent with posterior hip dislocation.  Patient underwent procedural sedation with successful reduction of the hip.  Case was discussed with Dr. Gómez who states the patient should be discharged home with a knee immobilizer to be worn whenever she is mobile, hold physical therapy, and follow-up with Dr. Mccormick in clinic this week.  This plan has been conveyed to the daughter..                           Clinical Impression:       ICD-10-CM ICD-9-CM   1. Hip pain  M25.559 719.45   2. Hip pain  M25.559 719.45                                               Cecy Lundy MD  11/16/20 0002    Patient was able to bear weight and ambulate with a knee immobilizer without pain.  Patient does use a walker at home at baseline which the daughter states is broken.  She was discharged home tonight with a walker.     Cecy Lundy MD  11/16/20 0256

## 2020-11-18 ENCOUNTER — NURSE TRIAGE (OUTPATIENT)
Dept: ADMINISTRATIVE | Facility: CLINIC | Age: 80
End: 2020-11-18

## 2020-11-30 ENCOUNTER — ANESTHESIA EVENT (OUTPATIENT)
Dept: SURGERY | Facility: OTHER | Age: 80
DRG: 467 | End: 2020-11-30
Payer: MEDICARE

## 2020-11-30 ENCOUNTER — ANESTHESIA (OUTPATIENT)
Dept: SURGERY | Facility: OTHER | Age: 80
DRG: 467 | End: 2020-11-30
Payer: MEDICARE

## 2020-11-30 ENCOUNTER — HOSPITAL ENCOUNTER (INPATIENT)
Facility: OTHER | Age: 80
LOS: 3 days | Discharge: HOME HEALTH | DRG: 467 | End: 2020-12-03
Attending: EMERGENCY MEDICINE | Admitting: ORTHOPAEDIC SURGERY
Payer: MEDICARE

## 2020-11-30 DIAGNOSIS — T14.90XA TRAUMA: ICD-10-CM

## 2020-11-30 DIAGNOSIS — S73.005A DISLOCATION OF LEFT HIP, INITIAL ENCOUNTER: Primary | ICD-10-CM

## 2020-11-30 DIAGNOSIS — S73.005A HIP DISLOCATION, LEFT: ICD-10-CM

## 2020-11-30 LAB
ALBUMIN SERPL BCP-MCNC: 3.2 G/DL (ref 3.5–5.2)
ALP SERPL-CCNC: 115 U/L (ref 55–135)
ALT SERPL W/O P-5'-P-CCNC: 11 U/L (ref 10–44)
ANION GAP SERPL CALC-SCNC: 10 MMOL/L (ref 8–16)
AST SERPL-CCNC: 21 U/L (ref 10–40)
BASOPHILS # BLD AUTO: 0.03 K/UL (ref 0–0.2)
BASOPHILS # BLD AUTO: 0.05 K/UL (ref 0–0.2)
BASOPHILS NFR BLD: 0.3 % (ref 0–1.9)
BASOPHILS NFR BLD: 0.6 % (ref 0–1.9)
BILIRUB SERPL-MCNC: 0.4 MG/DL (ref 0.1–1)
BUN SERPL-MCNC: 10 MG/DL (ref 8–23)
CALCIUM SERPL-MCNC: 8.7 MG/DL (ref 8.7–10.5)
CHLORIDE SERPL-SCNC: 101 MMOL/L (ref 95–110)
CK SERPL-CCNC: 145 U/L (ref 20–180)
CO2 SERPL-SCNC: 27 MMOL/L (ref 23–29)
CREAT SERPL-MCNC: 0.8 MG/DL (ref 0.5–1.4)
CTP QC/QA: YES
DIFFERENTIAL METHOD: ABNORMAL
DIFFERENTIAL METHOD: ABNORMAL
EOSINOPHIL # BLD AUTO: 0 K/UL (ref 0–0.5)
EOSINOPHIL # BLD AUTO: 0 K/UL (ref 0–0.5)
EOSINOPHIL NFR BLD: 0.1 % (ref 0–8)
EOSINOPHIL NFR BLD: 0.2 % (ref 0–8)
ERYTHROCYTE [DISTWIDTH] IN BLOOD BY AUTOMATED COUNT: 15 % (ref 11.5–14.5)
ERYTHROCYTE [DISTWIDTH] IN BLOOD BY AUTOMATED COUNT: 15 % (ref 11.5–14.5)
EST. GFR  (AFRICAN AMERICAN): >60 ML/MIN/1.73 M^2
EST. GFR  (NON AFRICAN AMERICAN): >60 ML/MIN/1.73 M^2
GLUCOSE SERPL-MCNC: 246 MG/DL (ref 70–110)
HCT VFR BLD AUTO: 40.1 % (ref 37–48.5)
HCT VFR BLD AUTO: 42.2 % (ref 37–48.5)
HGB BLD-MCNC: 12.1 G/DL (ref 12–16)
HGB BLD-MCNC: 12.5 G/DL (ref 12–16)
IMM GRANULOCYTES # BLD AUTO: 0.02 K/UL (ref 0–0.04)
IMM GRANULOCYTES # BLD AUTO: 0.03 K/UL (ref 0–0.04)
IMM GRANULOCYTES NFR BLD AUTO: 0.2 % (ref 0–0.5)
IMM GRANULOCYTES NFR BLD AUTO: 0.4 % (ref 0–0.5)
LYMPHOCYTES # BLD AUTO: 1.1 K/UL (ref 1–4.8)
LYMPHOCYTES # BLD AUTO: 1.6 K/UL (ref 1–4.8)
LYMPHOCYTES NFR BLD: 12.5 % (ref 18–48)
LYMPHOCYTES NFR BLD: 17.9 % (ref 18–48)
MCH RBC QN AUTO: 25.1 PG (ref 27–31)
MCH RBC QN AUTO: 25.2 PG (ref 27–31)
MCHC RBC AUTO-ENTMCNC: 29.6 G/DL (ref 32–36)
MCHC RBC AUTO-ENTMCNC: 30.2 G/DL (ref 32–36)
MCV RBC AUTO: 84 FL (ref 82–98)
MCV RBC AUTO: 85 FL (ref 82–98)
MONOCYTES # BLD AUTO: 0.3 K/UL (ref 0.3–1)
MONOCYTES # BLD AUTO: 0.7 K/UL (ref 0.3–1)
MONOCYTES NFR BLD: 3.9 % (ref 4–15)
MONOCYTES NFR BLD: 7.6 % (ref 4–15)
NEUTROPHILS # BLD AUTO: 6.7 K/UL (ref 1.8–7.7)
NEUTROPHILS # BLD AUTO: 6.9 K/UL (ref 1.8–7.7)
NEUTROPHILS NFR BLD: 73.8 % (ref 38–73)
NEUTROPHILS NFR BLD: 82.5 % (ref 38–73)
NRBC BLD-RTO: 0 /100 WBC
NRBC BLD-RTO: 0 /100 WBC
PLATELET # BLD AUTO: 311 K/UL (ref 150–350)
PLATELET # BLD AUTO: 336 K/UL (ref 150–350)
PMV BLD AUTO: 11.1 FL (ref 9.2–12.9)
PMV BLD AUTO: 11.7 FL (ref 9.2–12.9)
POCT GLUCOSE: 217 MG/DL (ref 70–110)
POTASSIUM SERPL-SCNC: 4.1 MMOL/L (ref 3.5–5.1)
PROT SERPL-MCNC: 6.9 G/DL (ref 6–8.4)
RBC # BLD AUTO: 4.8 M/UL (ref 4–5.4)
RBC # BLD AUTO: 4.99 M/UL (ref 4–5.4)
SARS-COV-2 RDRP RESP QL NAA+PROBE: NEGATIVE
SODIUM SERPL-SCNC: 138 MMOL/L (ref 136–145)
WBC # BLD AUTO: 8.37 K/UL (ref 3.9–12.7)
WBC # BLD AUTO: 9.07 K/UL (ref 3.9–12.7)

## 2020-11-30 PROCEDURE — 87070 CULTURE OTHR SPECIMN AEROBIC: CPT

## 2020-11-30 PROCEDURE — 99285 EMERGENCY DEPT VISIT HI MDM: CPT | Mod: 25

## 2020-11-30 PROCEDURE — 27201423 OPTIME MED/SURG SUP & DEVICES STERILE SUPPLY: Performed by: ORTHOPAEDIC SURGERY

## 2020-11-30 PROCEDURE — 36000711: Performed by: ORTHOPAEDIC SURGERY

## 2020-11-30 PROCEDURE — 94799 UNLISTED PULMONARY SVC/PX: CPT

## 2020-11-30 PROCEDURE — 37000008 HC ANESTHESIA 1ST 15 MINUTES: Performed by: ORTHOPAEDIC SURGERY

## 2020-11-30 PROCEDURE — 36000710: Performed by: ORTHOPAEDIC SURGERY

## 2020-11-30 PROCEDURE — 80053 COMPREHEN METABOLIC PANEL: CPT

## 2020-11-30 PROCEDURE — 27266 TREAT HIP DISLOCATION: CPT | Mod: 52,LT

## 2020-11-30 PROCEDURE — 94660 CPAP INITIATION&MGMT: CPT

## 2020-11-30 PROCEDURE — 85025 COMPLETE CBC W/AUTO DIFF WBC: CPT

## 2020-11-30 PROCEDURE — 63600175 PHARM REV CODE 636 W HCPCS: Performed by: NURSE ANESTHETIST, CERTIFIED REGISTERED

## 2020-11-30 PROCEDURE — 25000003 PHARM REV CODE 250: Performed by: ORTHOPAEDIC SURGERY

## 2020-11-30 PROCEDURE — P9045 ALBUMIN (HUMAN), 5%, 250 ML: HCPCS | Mod: JG | Performed by: NURSE ANESTHETIST, CERTIFIED REGISTERED

## 2020-11-30 PROCEDURE — 36415 COLL VENOUS BLD VENIPUNCTURE: CPT

## 2020-11-30 PROCEDURE — 25000242 PHARM REV CODE 250 ALT 637 W/ HCPCS: Performed by: NURSE ANESTHETIST, CERTIFIED REGISTERED

## 2020-11-30 PROCEDURE — U0002 COVID-19 LAB TEST NON-CDC: HCPCS | Performed by: EMERGENCY MEDICINE

## 2020-11-30 PROCEDURE — 63600175 PHARM REV CODE 636 W HCPCS: Performed by: EMERGENCY MEDICINE

## 2020-11-30 PROCEDURE — 71000033 HC RECOVERY, INTIAL HOUR: Performed by: ORTHOPAEDIC SURGERY

## 2020-11-30 PROCEDURE — 82550 ASSAY OF CK (CPK): CPT

## 2020-11-30 PROCEDURE — C1776 JOINT DEVICE (IMPLANTABLE): HCPCS | Performed by: ORTHOPAEDIC SURGERY

## 2020-11-30 PROCEDURE — 99900035 HC TECH TIME PER 15 MIN (STAT)

## 2020-11-30 PROCEDURE — 27000221 HC OXYGEN, UP TO 24 HOURS

## 2020-11-30 PROCEDURE — 71000039 HC RECOVERY, EACH ADD'L HOUR: Performed by: ORTHOPAEDIC SURGERY

## 2020-11-30 PROCEDURE — 63600175 PHARM REV CODE 636 W HCPCS: Performed by: ORTHOPAEDIC SURGERY

## 2020-11-30 PROCEDURE — C9290 INJ, BUPIVACAINE LIPOSOME: HCPCS | Performed by: ORTHOPAEDIC SURGERY

## 2020-11-30 PROCEDURE — 27000190 HC CPAP FULL FACE MASK W/VALVE

## 2020-11-30 PROCEDURE — 21400001 HC TELEMETRY ROOM

## 2020-11-30 PROCEDURE — 25000003 PHARM REV CODE 250: Performed by: ANESTHESIOLOGY

## 2020-11-30 PROCEDURE — 25000003 PHARM REV CODE 250: Performed by: NURSE ANESTHETIST, CERTIFIED REGISTERED

## 2020-11-30 PROCEDURE — 25000003 PHARM REV CODE 250: Performed by: EMERGENCY MEDICINE

## 2020-11-30 PROCEDURE — 87075 CULTR BACTERIA EXCEPT BLOOD: CPT

## 2020-11-30 PROCEDURE — 63600175 PHARM REV CODE 636 W HCPCS: Performed by: NURSE PRACTITIONER

## 2020-11-30 PROCEDURE — 94761 N-INVAS EAR/PLS OXIMETRY MLT: CPT

## 2020-11-30 PROCEDURE — 37000009 HC ANESTHESIA EA ADD 15 MINS: Performed by: ORTHOPAEDIC SURGERY

## 2020-11-30 PROCEDURE — 63600175 PHARM REV CODE 636 W HCPCS: Performed by: ANESTHESIOLOGY

## 2020-11-30 DEVICE — IMPLANTABLE DEVICE: Type: IMPLANTABLE DEVICE | Site: HIP | Status: FUNCTIONAL

## 2020-11-30 RX ORDER — HYDROMORPHONE HYDROCHLORIDE 1 MG/ML
0.5 INJECTION, SOLUTION INTRAMUSCULAR; INTRAVENOUS; SUBCUTANEOUS
Status: COMPLETED | OUTPATIENT
Start: 2020-11-30 | End: 2020-11-30

## 2020-11-30 RX ORDER — MEPERIDINE HYDROCHLORIDE 25 MG/ML
12.5 INJECTION INTRAMUSCULAR; INTRAVENOUS; SUBCUTANEOUS ONCE AS NEEDED
Status: DISCONTINUED | OUTPATIENT
Start: 2020-11-30 | End: 2020-11-30 | Stop reason: HOSPADM

## 2020-11-30 RX ORDER — FENTANYL CITRATE 50 UG/ML
50 INJECTION, SOLUTION INTRAMUSCULAR; INTRAVENOUS
Status: COMPLETED | OUTPATIENT
Start: 2020-11-30 | End: 2020-11-30

## 2020-11-30 RX ORDER — ONDANSETRON 2 MG/ML
4 INJECTION INTRAMUSCULAR; INTRAVENOUS DAILY PRN
Status: DISCONTINUED | OUTPATIENT
Start: 2020-11-30 | End: 2020-11-30 | Stop reason: HOSPADM

## 2020-11-30 RX ORDER — SODIUM CHLORIDE 0.9 % (FLUSH) 0.9 %
5 SYRINGE (ML) INJECTION
Status: DISCONTINUED | OUTPATIENT
Start: 2020-11-30 | End: 2020-12-03 | Stop reason: HOSPADM

## 2020-11-30 RX ORDER — IBUPROFEN 200 MG
16 TABLET ORAL
Status: DISCONTINUED | OUTPATIENT
Start: 2020-11-30 | End: 2020-12-03 | Stop reason: HOSPADM

## 2020-11-30 RX ORDER — FAMOTIDINE 20 MG/1
20 TABLET, FILM COATED ORAL DAILY
Status: DISCONTINUED | OUTPATIENT
Start: 2020-12-01 | End: 2020-12-03 | Stop reason: HOSPADM

## 2020-11-30 RX ORDER — OXYCODONE AND ACETAMINOPHEN 10; 325 MG/1; MG/1
1 TABLET ORAL EVERY 4 HOURS PRN
Status: DISCONTINUED | OUTPATIENT
Start: 2020-11-30 | End: 2020-12-03 | Stop reason: HOSPADM

## 2020-11-30 RX ORDER — FENTANYL CITRATE 50 UG/ML
50 INJECTION, SOLUTION INTRAMUSCULAR; INTRAVENOUS
Status: DISCONTINUED | OUTPATIENT
Start: 2020-11-30 | End: 2020-11-30 | Stop reason: ALTCHOICE

## 2020-11-30 RX ORDER — TALC
6 POWDER (GRAM) TOPICAL NIGHTLY PRN
Status: DISCONTINUED | OUTPATIENT
Start: 2020-11-30 | End: 2020-11-30

## 2020-11-30 RX ORDER — TRANEXAMIC ACID 100 MG/ML
INJECTION, SOLUTION INTRAVENOUS
Status: DISCONTINUED | OUTPATIENT
Start: 2020-11-30 | End: 2020-11-30 | Stop reason: HOSPADM

## 2020-11-30 RX ORDER — CEFAZOLIN SODIUM 2 G/50ML
2 SOLUTION INTRAVENOUS
Status: COMPLETED | OUTPATIENT
Start: 2020-11-30 | End: 2020-12-01

## 2020-11-30 RX ORDER — SODIUM CHLORIDE 0.9 % (FLUSH) 0.9 %
10 SYRINGE (ML) INJECTION
Status: DISCONTINUED | OUTPATIENT
Start: 2020-11-30 | End: 2020-11-30

## 2020-11-30 RX ORDER — INSULIN ASPART 100 [IU]/ML
0-5 INJECTION, SOLUTION INTRAVENOUS; SUBCUTANEOUS
Status: DISCONTINUED | OUTPATIENT
Start: 2020-11-30 | End: 2020-12-03 | Stop reason: HOSPADM

## 2020-11-30 RX ORDER — SODIUM CHLORIDE 0.9 % (FLUSH) 0.9 %
3 SYRINGE (ML) INJECTION
Status: DISCONTINUED | OUTPATIENT
Start: 2020-11-30 | End: 2020-12-03 | Stop reason: HOSPADM

## 2020-11-30 RX ORDER — ACETAMINOPHEN 10 MG/ML
INJECTION, SOLUTION INTRAVENOUS
Status: DISCONTINUED | OUTPATIENT
Start: 2020-11-30 | End: 2020-11-30

## 2020-11-30 RX ORDER — TALC
9 POWDER (GRAM) TOPICAL NIGHTLY PRN
Status: DISCONTINUED | OUTPATIENT
Start: 2020-11-30 | End: 2020-12-03 | Stop reason: HOSPADM

## 2020-11-30 RX ORDER — CEFAZOLIN SODIUM 1 G/3ML
2 INJECTION, POWDER, FOR SOLUTION INTRAMUSCULAR; INTRAVENOUS
Status: DISCONTINUED | OUTPATIENT
Start: 2020-11-30 | End: 2020-11-30 | Stop reason: HOSPADM

## 2020-11-30 RX ORDER — NAPROXEN SODIUM 220 MG/1
81 TABLET, FILM COATED ORAL 2 TIMES DAILY
Status: DISCONTINUED | OUTPATIENT
Start: 2020-11-30 | End: 2020-12-03 | Stop reason: HOSPADM

## 2020-11-30 RX ORDER — OXYCODONE HYDROCHLORIDE 5 MG/1
5 TABLET ORAL
Status: DISCONTINUED | OUTPATIENT
Start: 2020-11-30 | End: 2020-11-30 | Stop reason: HOSPADM

## 2020-11-30 RX ORDER — PROPOFOL 10 MG/ML
VIAL (ML) INTRAVENOUS CODE/TRAUMA/SEDATION MEDICATION
Status: COMPLETED | OUTPATIENT
Start: 2020-11-30 | End: 2020-11-30

## 2020-11-30 RX ORDER — HYDROMORPHONE HYDROCHLORIDE 2 MG/ML
0.4 INJECTION, SOLUTION INTRAMUSCULAR; INTRAVENOUS; SUBCUTANEOUS EVERY 5 MIN PRN
Status: DISCONTINUED | OUTPATIENT
Start: 2020-11-30 | End: 2020-11-30 | Stop reason: HOSPADM

## 2020-11-30 RX ORDER — IBUPROFEN 200 MG
24 TABLET ORAL
Status: DISCONTINUED | OUTPATIENT
Start: 2020-11-30 | End: 2020-12-03 | Stop reason: HOSPADM

## 2020-11-30 RX ORDER — SODIUM CHLORIDE, SODIUM LACTATE, POTASSIUM CHLORIDE, CALCIUM CHLORIDE 600; 310; 30; 20 MG/100ML; MG/100ML; MG/100ML; MG/100ML
INJECTION, SOLUTION INTRAVENOUS CONTINUOUS PRN
Status: DISCONTINUED | OUTPATIENT
Start: 2020-11-30 | End: 2020-11-30

## 2020-11-30 RX ORDER — FENTANYL CITRATE 50 UG/ML
100 INJECTION, SOLUTION INTRAMUSCULAR; INTRAVENOUS
Status: COMPLETED | OUTPATIENT
Start: 2020-11-30 | End: 2020-11-30

## 2020-11-30 RX ORDER — LIDOCAINE HCL/PF 100 MG/5ML
SYRINGE (ML) INTRAVENOUS
Status: DISCONTINUED | OUTPATIENT
Start: 2020-11-30 | End: 2020-11-30

## 2020-11-30 RX ORDER — DULOXETIN HYDROCHLORIDE 30 MG/1
30 CAPSULE, DELAYED RELEASE ORAL DAILY
Status: DISCONTINUED | OUTPATIENT
Start: 2020-11-30 | End: 2020-12-03 | Stop reason: HOSPADM

## 2020-11-30 RX ORDER — ALBUTEROL SULFATE 90 UG/1
AEROSOL, METERED RESPIRATORY (INHALATION)
Status: DISCONTINUED | OUTPATIENT
Start: 2020-11-30 | End: 2020-11-30

## 2020-11-30 RX ORDER — ALBUMIN HUMAN 50 G/1000ML
SOLUTION INTRAVENOUS CONTINUOUS PRN
Status: DISCONTINUED | OUTPATIENT
Start: 2020-11-30 | End: 2020-11-30

## 2020-11-30 RX ORDER — AMLODIPINE BESYLATE 5 MG/1
10 TABLET ORAL DAILY
Status: DISCONTINUED | OUTPATIENT
Start: 2020-11-30 | End: 2020-12-02

## 2020-11-30 RX ORDER — BUPIVACAINE HYDROCHLORIDE AND EPINEPHRINE 2.5; 5 MG/ML; UG/ML
INJECTION, SOLUTION EPIDURAL; INFILTRATION; INTRACAUDAL; PERINEURAL
Status: DISCONTINUED | OUTPATIENT
Start: 2020-11-30 | End: 2020-11-30 | Stop reason: HOSPADM

## 2020-11-30 RX ORDER — SODIUM CHLORIDE 9 MG/ML
INJECTION, SOLUTION INTRAVENOUS CONTINUOUS
Status: DISCONTINUED | OUTPATIENT
Start: 2020-11-30 | End: 2020-12-03 | Stop reason: HOSPADM

## 2020-11-30 RX ORDER — KETAMINE HCL IN 0.9 % NACL 50 MG/5 ML
SYRINGE (ML) INTRAVENOUS
Status: DISCONTINUED | OUTPATIENT
Start: 2020-11-30 | End: 2020-11-30

## 2020-11-30 RX ORDER — PROPOFOL 10 MG/ML
VIAL (ML) INTRAVENOUS
Status: DISCONTINUED | OUTPATIENT
Start: 2020-11-30 | End: 2020-11-30

## 2020-11-30 RX ORDER — GLUCAGON 1 MG
1 KIT INJECTION
Status: DISCONTINUED | OUTPATIENT
Start: 2020-11-30 | End: 2020-12-03 | Stop reason: HOSPADM

## 2020-11-30 RX ORDER — POLYETHYLENE GLYCOL 3350 17 G/17G
17 POWDER, FOR SOLUTION ORAL DAILY
Status: DISCONTINUED | OUTPATIENT
Start: 2020-12-01 | End: 2020-12-03 | Stop reason: HOSPADM

## 2020-11-30 RX ORDER — LISINOPRIL 20 MG/1
40 TABLET ORAL DAILY
Status: DISCONTINUED | OUTPATIENT
Start: 2020-11-30 | End: 2020-12-02

## 2020-11-30 RX ORDER — ROCURONIUM BROMIDE 10 MG/ML
INJECTION, SOLUTION INTRAVENOUS
Status: DISCONTINUED | OUTPATIENT
Start: 2020-11-30 | End: 2020-11-30

## 2020-11-30 RX ORDER — NAPROXEN SODIUM 220 MG/1
81 TABLET, FILM COATED ORAL 2 TIMES DAILY
Status: DISCONTINUED | OUTPATIENT
Start: 2020-11-30 | End: 2020-11-30

## 2020-11-30 RX ORDER — PHENYLEPHRINE HYDROCHLORIDE 10 MG/ML
INJECTION INTRAVENOUS
Status: DISCONTINUED | OUTPATIENT
Start: 2020-11-30 | End: 2020-11-30

## 2020-11-30 RX ORDER — FENTANYL CITRATE 50 UG/ML
INJECTION, SOLUTION INTRAMUSCULAR; INTRAVENOUS
Status: DISCONTINUED | OUTPATIENT
Start: 2020-11-30 | End: 2020-11-30

## 2020-11-30 RX ORDER — VASOPRESSIN 20 [USP'U]/ML
INJECTION, SOLUTION INTRAMUSCULAR; SUBCUTANEOUS
Status: DISCONTINUED | OUTPATIENT
Start: 2020-11-30 | End: 2020-11-30

## 2020-11-30 RX ORDER — CEFAZOLIN SODIUM 1 G/3ML
INJECTION, POWDER, FOR SOLUTION INTRAMUSCULAR; INTRAVENOUS
Status: DISCONTINUED | OUTPATIENT
Start: 2020-11-30 | End: 2020-11-30

## 2020-11-30 RX ORDER — ONDANSETRON HYDROCHLORIDE 2 MG/ML
INJECTION, SOLUTION INTRAMUSCULAR; INTRAVENOUS
Status: DISCONTINUED | OUTPATIENT
Start: 2020-11-30 | End: 2020-11-30

## 2020-11-30 RX ORDER — PROPOFOL 10 MG/ML
INJECTION, EMULSION INTRAVENOUS
Status: DISPENSED
Start: 2020-11-30 | End: 2020-11-30

## 2020-11-30 RX ORDER — DEXTROSE MONOHYDRATE AND SODIUM CHLORIDE 5; .9 G/100ML; G/100ML
INJECTION, SOLUTION INTRAVENOUS CONTINUOUS
Status: DISCONTINUED | OUTPATIENT
Start: 2020-11-30 | End: 2020-12-03 | Stop reason: HOSPADM

## 2020-11-30 RX ORDER — MORPHINE SULFATE 4 MG/ML
4 INJECTION, SOLUTION INTRAMUSCULAR; INTRAVENOUS
Status: DISCONTINUED | OUTPATIENT
Start: 2020-11-30 | End: 2020-12-03 | Stop reason: HOSPADM

## 2020-11-30 RX ORDER — ACETAMINOPHEN 325 MG/1
650 TABLET ORAL EVERY 8 HOURS PRN
Status: DISCONTINUED | OUTPATIENT
Start: 2020-11-30 | End: 2020-12-03 | Stop reason: HOSPADM

## 2020-11-30 RX ORDER — ONDANSETRON 8 MG/1
8 TABLET, ORALLY DISINTEGRATING ORAL EVERY 8 HOURS PRN
Status: DISCONTINUED | OUTPATIENT
Start: 2020-11-30 | End: 2020-12-03 | Stop reason: HOSPADM

## 2020-11-30 RX ORDER — OXYCODONE AND ACETAMINOPHEN 5; 325 MG/1; MG/1
1 TABLET ORAL EVERY 4 HOURS PRN
Status: DISCONTINUED | OUTPATIENT
Start: 2020-11-30 | End: 2020-12-03 | Stop reason: HOSPADM

## 2020-11-30 RX ADMIN — VASOPRESSIN 1 UNITS: 20 INJECTION, SOLUTION INTRAMUSCULAR; SUBCUTANEOUS at 03:11

## 2020-11-30 RX ADMIN — HYDROMORPHONE HYDROCHLORIDE 0.4 MG: 2 INJECTION, SOLUTION INTRAMUSCULAR; INTRAVENOUS; SUBCUTANEOUS at 05:11

## 2020-11-30 RX ADMIN — DULOXETINE HYDROCHLORIDE 30 MG: 30 CAPSULE, DELAYED RELEASE ORAL at 10:11

## 2020-11-30 RX ADMIN — PROPOFOL 200 MG: 10 INJECTION, EMULSION INTRAVENOUS at 03:11

## 2020-11-30 RX ADMIN — ACETAMINOPHEN 650 MG: 325 TABLET, FILM COATED ORAL at 10:11

## 2020-11-30 RX ADMIN — SUGAMMADEX 200 MG: 100 INJECTION, SOLUTION INTRAVENOUS at 04:11

## 2020-11-30 RX ADMIN — PROPOFOL 30 MG: 10 INJECTION, EMULSION INTRAVENOUS at 03:11

## 2020-11-30 RX ADMIN — ALBUTEROL SULFATE 4 PUFF: 90 AEROSOL, METERED RESPIRATORY (INHALATION) at 04:11

## 2020-11-30 RX ADMIN — LIDOCAINE HYDROCHLORIDE 100 MG: 20 INJECTION, SOLUTION INTRAVENOUS at 03:11

## 2020-11-30 RX ADMIN — FENTANYL CITRATE 100 MCG: 50 INJECTION, SOLUTION INTRAMUSCULAR; INTRAVENOUS at 04:11

## 2020-11-30 RX ADMIN — FENTANYL CITRATE 50 MCG: 50 INJECTION, SOLUTION INTRAMUSCULAR; INTRAVENOUS at 04:11

## 2020-11-30 RX ADMIN — PHENYLEPHRINE HYDROCHLORIDE 100 MCG: 10 INJECTION INTRAVENOUS at 03:11

## 2020-11-30 RX ADMIN — HYDROMORPHONE HYDROCHLORIDE 0.4 MG: 2 INJECTION, SOLUTION INTRAMUSCULAR; INTRAVENOUS; SUBCUTANEOUS at 06:11

## 2020-11-30 RX ADMIN — GLYCOPYRROLATE 0.2 MG: 0.2 INJECTION, SOLUTION INTRAMUSCULAR; INTRAVITREAL at 03:11

## 2020-11-30 RX ADMIN — INSULIN ASPART 1 UNITS: 100 INJECTION, SOLUTION INTRAVENOUS; SUBCUTANEOUS at 09:11

## 2020-11-30 RX ADMIN — ACETAMINOPHEN 1000 MG: 10 INJECTION, SOLUTION INTRAVENOUS at 04:11

## 2020-11-30 RX ADMIN — PROPOFOL 50 MG: 10 INJECTION, EMULSION INTRAVENOUS at 03:11

## 2020-11-30 RX ADMIN — HYDROMORPHONE HYDROCHLORIDE 0.5 MG: 1 INJECTION, SOLUTION INTRAMUSCULAR; INTRAVENOUS; SUBCUTANEOUS at 07:11

## 2020-11-30 RX ADMIN — Medication 35 MG: at 03:11

## 2020-11-30 RX ADMIN — ONDANSETRON 4 MG: 2 INJECTION, SOLUTION INTRAMUSCULAR; INTRAVENOUS at 03:11

## 2020-11-30 RX ADMIN — PHENYLEPHRINE HYDROCHLORIDE 100 MCG: 10 INJECTION INTRAVENOUS at 04:11

## 2020-11-30 RX ADMIN — SODIUM CHLORIDE, SODIUM LACTATE, POTASSIUM CHLORIDE, AND CALCIUM CHLORIDE: 600; 310; 30; 20 INJECTION, SOLUTION INTRAVENOUS at 02:11

## 2020-11-30 RX ADMIN — LISINOPRIL 40 MG: 20 TABLET ORAL at 09:11

## 2020-11-30 RX ADMIN — CEFAZOLIN SODIUM 2 G: 2 SOLUTION INTRAVENOUS at 11:11

## 2020-11-30 RX ADMIN — AMLODIPINE BESYLATE 10 MG: 5 TABLET ORAL at 10:11

## 2020-11-30 RX ADMIN — ALBUMIN (HUMAN): 12.5 SOLUTION INTRAVENOUS at 03:11

## 2020-11-30 RX ADMIN — CEFAZOLIN 2 G: 330 INJECTION, POWDER, FOR SOLUTION INTRAMUSCULAR; INTRAVENOUS at 03:11

## 2020-11-30 RX ADMIN — FENTANYL CITRATE 150 MCG: 50 INJECTION, SOLUTION INTRAMUSCULAR; INTRAVENOUS at 03:11

## 2020-11-30 RX ADMIN — ASPIRIN 81 MG: 81 TABLET, CHEWABLE ORAL at 09:11

## 2020-11-30 RX ADMIN — OXYCODONE HYDROCHLORIDE 5 MG: 5 TABLET ORAL at 05:11

## 2020-11-30 RX ADMIN — FENTANYL CITRATE 50 MCG: 50 INJECTION, SOLUTION INTRAMUSCULAR; INTRAVENOUS at 02:11

## 2020-11-30 RX ADMIN — ROCURONIUM BROMIDE 50 MG: 10 INJECTION, SOLUTION INTRAVENOUS at 03:11

## 2020-11-30 NOTE — ANESTHESIA POSTPROCEDURE EVALUATION
Anesthesia Post Evaluation    Patient: Jayne You    Procedure(s) Performed: Procedure(s) (LRB):  REVISION, TOTAL ARTHROPLASTY, HIP (Left)    Final Anesthesia Type: general    Patient location during evaluation: PACU  Patient participation: Yes- Able to Participate  Level of consciousness: awake and alert  Post-procedure vital signs: reviewed and stable  Pain management: adequate  Airway patency: patent    PONV status at discharge: No PONV  Anesthetic complications: no      Cardiovascular status: blood pressure returned to baseline  Respiratory status: unassisted and spontaneous ventilation  Hydration status: euvolemic  Follow-up not needed.          Vitals Value Taken Time   /75 11/30/20 1714   Temp 36.8 °C (98.2 °F) 11/30/20 1654   Pulse 98 11/30/20 1722   Resp 18 11/30/20 1654   SpO2 98 % 11/30/20 1722   Vitals shown include unvalidated device data.      No case tracking events are documented in the log.      Pain/Rahul Score: Pain Rating Prior to Med Admin: 8 (11/30/2020  9:59 AM)  Rahul Score: 8 (11/30/2020  4:09 AM)

## 2020-11-30 NOTE — ED NOTES
Pt awake and alert, resp pattern even and non labored. The pt denies any current needs. Pain addressed, daughter at bedside. Side rails up, bed locked in lowest position, WCTM.

## 2020-11-30 NOTE — TRANSFER OF CARE
Anesthesia Transfer of Care Note    Patient: Jayne You    Procedure(s) Performed: Procedure(s) (LRB):  REVISION, TOTAL ARTHROPLASTY, HIP (Left)    Patient location: PACU    Anesthesia Type: general    Transport from OR: Transported from OR on 2-3 L/min O2 by NC with adequate spontaneous ventilation    Post pain: adequate analgesia    Post assessment: tolerated procedure well and no apparent anesthetic complications    Post vital signs: stable    Level of consciousness: awake and alert    Nausea/Vomiting: no nausea/vomiting    Complications: none    Transfer of care protocol was followed      Last vitals:   Visit Vitals  BP (!) 162/73   Pulse 93   Temp 36.7 °C (98 °F)   Resp 18   Wt 114 kg (251 lb 5.2 oz)   SpO2 (!) 94%   BMI 43.14 kg/m²

## 2020-11-30 NOTE — H&P
Subjective:     Status post left hip replacement 11/5/20.  This is her second dislocation and left.  High risk because of prior lumbar fusion.  Plan is to  perform revision hopefully to stabilize the situation.  Significant risks discussed.    Patient Active Problem List    Diagnosis Date Noted    Hip dislocation, left 11/30/2020    Pain of left lower extremity 06/03/2020    Weakness of left lower extremity 06/03/2020     Past Medical History:   Diagnosis Date    Arthritis     Cardiomyopathy     Depression     Diabetes mellitus     Hypertension     Lipids abnormal     Lumbar herniated disc     Obesity     RBBB     Sleep apnea       Past Surgical History:   Procedure Laterality Date    APPENDECTOMY      BACK SURGERY  2014,2016,2017    BREAST SURGERY Bilateral     reduction    EYE SURGERY Bilateral     HIP ARTHROPLASTY Left 11/5/2020    Procedure: ARTHROPLASTY, HIP;  Surgeon: Isaiah Mccormick MD;  Location: [facility];  Service: Orthopedics;  Laterality: Left;    JOINT REPLACEMENT Right 2006    hip    TOTAL HIP ARTHROPLASTY Right 2006    TOTAL KNEE ARTHROPLASTY Left 7/18/2019    Procedure: ARTHROPLASTY, KNEE, TOTAL;  Surgeon: Isaiah Mccormick MD;  Location: [facility];  Service: Orthopedics;  Laterality: Left;      (Not in a hospital admission)    Review of patient's allergies indicates:   Allergen Reactions    Codeine Nausea And Vomiting     Pt reports can take percocet      Social History     Tobacco Use    Smoking status: Never Smoker    Smokeless tobacco: Never Used   Substance Use Topics    Alcohol use: No      No family history on file.   Review of Systems  Pertinent items are noted in HPI.    Objective:     Patient Vitals for the past 8 hrs:   BP Temp Temp src Pulse Resp SpO2   11/30/20 0741 (!) 148/88 -- -- 95 18 100 %   11/30/20 0723 (!) 160/88 -- -- 98 15 100 %   11/30/20 0713 -- -- -- 101 (!) 21 100 %   11/30/20 0710 -- -- -- -- 18 --   11/30/20 0602 (!) 175/80 -- -- -- -- --    11/30/20 0601 -- -- -- 99 19 100 %   11/30/20 0532 (!) 162/78 -- -- 100 19 100 %   11/30/20 0456 -- -- -- 99 15 100 %   11/30/20 0445 -- -- -- -- 16 --   11/30/20 0411 (!) 160/92 -- -- 103 (!) 23 96 %   11/30/20 0404 (!) 160/92 -- -- 110 (!) 26 97 %   11/30/20 0400 (!) 164/82 -- -- 109 (!) 23 96 %   11/30/20 0352 (!) 192/89 -- -- 101 19 100 %   11/30/20 0352 (!) 192/89 -- -- -- -- --   11/30/20 0317 -- -- -- -- -- 97 %   11/30/20 0316 (!) 192/89 -- -- 100 -- --   11/30/20 0212 -- -- -- 101 16 98 %   11/30/20 0200 (!) 140/85 -- -- 102 -- --   11/30/20 0146 (!) 180/76 98.9 °F (37.2 °C) Oral 88 18 95 %     Deformity left lower extremity consistent with dislocation    Imaging Review  Dislocated left hip components appear to be aligned    Assessment:     Active Hospital Problems    Diagnosis  POA    Hip dislocation, left [S73.005A]  Yes      Resolved Hospital Problems   No resolved problems to display.       Plan:     The various methods of treatment have been discussed with the patient and family.   After consideration of risks, benefits and other options for treatment, the patient has consented to surgical interventions (significant risks discussed).  Questions were answered and Pre-op teaching was done by me.

## 2020-11-30 NOTE — ANESTHESIA PROCEDURE NOTES
Intubation  Performed by: Bony Martinez Jr., CRNA  Authorized by: Elliot Unger MD     Intubation:     Induction:  Intravenous    Intubated:  Postinduction    Mask Ventilation:  Easy with oral airway    Attempts:  1    Attempted By:  CRNA    Method of Intubation:  Video laryngoscopy    Blade:  Mclain 3    Laryngeal View Grade: Grade I - full view of chords      Difficult Airway Encountered?: No      Complications:  None    Airway Device:  Oral endotracheal tube    Airway Device Size:  7.5    Style/Cuff Inflation:  Cuffed    Tube secured:  22    Secured at:  The lips    Placement Verified By:  Capnometry    Complicating Factors:  None    Findings Post-Intubation:  BS equal bilateral and atraumatic/condition of teeth unchanged

## 2020-11-30 NOTE — OP NOTE
DATE OF PROCEDURE: 11/30/2020     CHIEF COMPLAINT AND PRESENT ILLNESS:   80-year-old dislocated twice since hip about 3 weeks ago.  Difficulties her size as well as her prior lumbar fusion lumbar fusion being the most difficult part of it.  Because recurrent nature plan for open revision to see what is causing the problem.  Significant risk discussed     PREOPERATIVE DIAGNOSIS:  Left hip replacement instability anterior dislocation     POSTOPERATIVE DIAGNOSIS:   same     PROCEDURES PERFORMED:   left hip revision 28 head -3 metal [employer]    SURGEON:  Isaiah Mccormick M.D.     ASSISTANT:  Raquel Vega CST.     COMPLICATIONS:   none     ANESTHESIA:  General     BLOOD LOSS:   250     IMPLANTS:  As above     PROCEDURE IN DETAIL:   patient brought the operating room underwent general anesthesia on a regular bed.  In the regular bed she was dislocated anteriorly.  With the posterior leg.  With with just direct traction and internal rotation was able to reduce it.  Flexion was no problem AB duction and adduction no problem.  The problem was with extension and external rotation you could feel it lever ring out.  It was very noticeable on a regular hospital bed she was moved over to the regular operating room in reduced position.  Leg lengths were equal.  On the regular bed and especially with a bump underneath her sacrum really could not dislocate her.  The change in the angle of the pelvis with a lumbar fusion made the instability not apparent.  This was all taken into affected in planning the surgery.  The left hip was then prepped and draped in usual fashion.  Using the prior incision she had a pretty good size hematoma distally which had come up after these 2 dislocations which is to be expected.  Hematoma level was that the ITB band subcutaneous fat level.  Cultures were taken.  ITB band was split.  The abductor musculature was still present but it pulled off the footprint from the ortho cord sutures.  The angle of the  joint was inspected and the cup seemed to be in good alignment.  If anything slightly retroverted but in this case is ideal because she was going out anterior.  Again the stability was checked and really could not dislocate her with multiple maneuvers.  But on moving her you could feel that she was lever ring some posteriorly so a few forced the extension in external rotation you could lever off the front.  The hip was dislocated.  The head was removed.  The stem was stable in appropriate position.  Established tractors were placed.  An with an osteotome some inferior bone was removed off the acetabulum.  She had good clear space that point.  I did a trial which was very difficulty with a -3.  Excellent stability unable to dislocate without a bone hook.  Left leg slightly longer period certainly acceptable.  Final -3 was placed.  Again difficulty reducing.  Again excellent range of motion and stability unable to dislocate with positioning.  Pulse lavage irrigation was used with the backed sure just to make sure because of the hematoma.  And then regular irrigation.  Ortho cord was using the abductor musculature at the trochanter.  1.  Vicryl was used on the ITB band and abductor fascia.  Number Vicryl in the fatty layer to 0 Vicryl subcutaneously staples on the skin Exparel tranexamic acid and ropivacaine were instilled the soft tissues placed in sterile bandage brought to come sterile fashion    This was performed with a poor recognition system

## 2020-11-30 NOTE — ED TRIAGE NOTES
"Pt presents to ED c/o hip dislocation at 7PM. Pt reports walking and hearing a pop to right side hip. Pt reports hx of  Pt reports having to crawl on floor to call EMS and unable to stand. Pt reports increase in pain. Pt denies SOB, CP,and dizziness. Pt states, "This happened last time." Shortening of right leg noted. No rotation noted to right hip.    "

## 2020-11-30 NOTE — ED PROVIDER NOTES
"Encounter Date: 11/30/2020    SCRIBE #1 NOTE: IKacie, am scribing for, and in the presence of, Dr. Cartagena.   SCRIBE #2 NOTE: I, Siria Zhang, am scribing for, and in the presence of, Dr. Cartagena.     History     Chief Complaint   Patient presents with    Fall     pt came to the ed tonight s.p trip and fall tonight around 7pm. pt felt a pop to the effected side. no rotation.  recently surgery to the left side      Time seen by provider: 1:54 AM    This is a 80 y.o. female with history of HTN, DM, and cardiomyopathy who presents with complaint of left hip pain. Patient had left hip arthroplasty 3.5 weeks ago on 11/05/2020. She was seen here 2 weeks ago on 11/15/2020 with posterior dislocation of left hip, which was reduced in the ED. She reports that she was walking away from her bed about 7 hours ago when she suddenly felt her left hip "pop" with subsequent pain. She states that she did not fall prior to onset of pain, though fell to her knees afterwards. No head trauma or LOC. She crawled on her knees towards the phone to call paramedics, but was on the floor for a while before they arrived. She notes that this feels similar to recent hip dislocation. This is the extent of the patient's complaints at this time.    The history is provided by the patient and medical records.     Review of patient's allergies indicates:   Allergen Reactions    Codeine Nausea And Vomiting     Pt reports can take percocet     Past Medical History:   Diagnosis Date    Arthritis     Cardiomyopathy     Depression     Diabetes mellitus     Hypertension     Lipids abnormal     Lumbar herniated disc     Obesity     RBBB     Sleep apnea      Past Surgical History:   Procedure Laterality Date    APPENDECTOMY      BACK SURGERY  2014,2016,2017    BREAST SURGERY Bilateral     reduction    EYE SURGERY Bilateral     HIP ARTHROPLASTY Left 11/5/2020    Procedure: ARTHROPLASTY, HIP;  Surgeon: Isaiah Mccormick MD;  " Location: [facility];  Service: Orthopedics;  Laterality: Left;    JOINT REPLACEMENT Right 2006    hip    TOTAL HIP ARTHROPLASTY Right 2006    TOTAL KNEE ARTHROPLASTY Left 7/18/2019    Procedure: ARTHROPLASTY, KNEE, TOTAL;  Surgeon: Isaiah Mccormick MD;  Location: [facility];  Service: Orthopedics;  Laterality: Left;     No family history on file.  Social History     Tobacco Use    Smoking status: Never Smoker    Smokeless tobacco: Never Used   Substance Use Topics    Alcohol use: No    Drug use: Not on file     Review of Systems   Constitutional: Negative for chills and fever.   HENT: Negative for rhinorrhea, sore throat and trouble swallowing.    Respiratory: Negative for chest tightness, shortness of breath and wheezing.    Cardiovascular: Negative for chest pain, palpitations and leg swelling.   Gastrointestinal: Negative for abdominal pain, diarrhea, nausea and vomiting.   Genitourinary: Negative for dysuria and vaginal bleeding.   Musculoskeletal:        Positive for left hip pain.   Neurological: Negative for syncope, speech difficulty and light-headedness.   All other systems reviewed and are negative.      Physical Exam     Initial Vitals [11/30/20 0146]   BP Pulse Resp Temp SpO2   (!) 180/76 88 18 98.9 °F (37.2 °C) 95 %      MAP       --         Physical Exam    Nursing note and vitals reviewed.  Constitutional: She appears well-developed and well-nourished. She is not diaphoretic. No distress.   HENT:   Head: Normocephalic and atraumatic.   Eyes: EOM are normal.   Neck: Normal range of motion. Neck supple.   Cardiovascular: Normal rate, regular rhythm and normal heart sounds. Exam reveals no gallop and no friction rub.    No murmur heard.  Pulmonary/Chest: Breath sounds normal. No respiratory distress. She has no wheezes. She has no rhonchi. She has no rales.   Musculoskeletal:      Comments: LLE: Incisional wound is clean, dry, and intact. Leg is shortened and rotated. Tenderness of proximal mid  thigh.    Neurological: She is alert and oriented to person, place, and time.   Skin: Skin is warm and dry.   Psychiatric: She has a normal mood and affect. Her behavior is normal.         ED Course   Orthopedic Injury    Date/Time: 2020 4:04 AM  Performed by: Austen Cartagena MD  Authorized by: Austen Cartagena MD     Location procedure was performed:  [facility] EMERGENCY DEPARTMENT  Consent Done?:  Yes  Universal Protocol:     Written consent obtained?: Yes      Risks and benefits: Risks, benefits and alternatives were discussed      Consent given by:  Patient (Daughter)    Patient states understanding of procedure being performed: Yes      Patient's understanding of procedure matches consent: Yes      Procedure consent matches procedure scheduled: Yes      Relevant documents present and verified: Yes      Test results available and properly labeled: Yes      Site marked: Yes      Imaging studies available: Yes      Patient identity confirmed:  , name and verbally with patient  Injury:     Injury location:  Hip    Location details:  Left hip    Injury type:  Dislocation    Dislocation type: posterior      Prosthesis?: Yes        Pre-procedure assessment:     Neurovascular status: Neurovascularly intact      Distal perfusion: normal      Neurological function: normal      Range of motion: reduced      Local anesthesia used?: No      Patient sedated?: Yes      ASA Class:  Class 3 - Systemic Illness with functional impairment.    Mallampati Score:  Class 2 - Visualization of the soft palate, fauces, and uvula.    Patient/Family history of anesthesia or sedation complications: No      Sedation:  Propofol    Analgesia:  Fentanyl    Vital signs: Vital signs monitored during sedation        Selections made in this section will also lock the Injury type section above.:     Manipulation performed?: Yes      Reduction method:  Allis maneuver    Reduction method:  Allis maneuver    Reduction method:  Allis maneuver     Reduction method:  Allis maneuver    Reduction method:  Allis maneuver    Reduction method:  Allis maneuver    Reduction successful?: No    Post-procedure assessment:     Neurovascular status: Neurovascularly intact      Distal perfusion: normal      Neurological function: normal      Range of motion: unchanged      Patient tolerance:  Patient tolerated the procedure well with no immediate complications      Labs Reviewed   CBC W/ AUTO DIFFERENTIAL - Abnormal; Notable for the following components:       Result Value    MCH 25.2 (*)     MCHC 30.2 (*)     RDW 15.0 (*)     Gran % 82.5 (*)     Lymph % 12.5 (*)     Mono % 3.9 (*)     All other components within normal limits   COMPREHENSIVE METABOLIC PANEL - Abnormal; Notable for the following components:    Glucose 246 (*)     Albumin 3.2 (*)     All other components within normal limits   CK   URINALYSIS, REFLEX TO URINE CULTURE          Imaging Results          X-Ray Hip 1 View Left (Final result)  Result time 11/30/20 04:20:04    Final result by Alejandro Ramey MD (11/30/20 04:20:04)                 Impression:      Left hip dislocation.      Electronically signed by: Alejandro Ramey  Date:    11/30/2020  Time:    04:20             Narrative:    EXAMINATION:  XR HIP 1 VIEW LEFT    CLINICAL HISTORY:  trauma;    TECHNIQUE:  Single view of the left hip was performed.    COMPARISON:  November 30, 2020 02:26    FINDINGS:  Single view of the left hip indicates disc articulation of the total hip arthroplasty.  It appears that the femoral component is posterior and elevated in relation to the acetabular cup.  There does not appear to be evidence of an acute injury involving the acetabulum or iliac wing.  No apparent fracture of the proximal femur.                                X-Ray Hip 2 View Left (Final result)  Result time 11/30/20 02:49:36    Final result by Keyon Almanza MD (11/30/20 02:49:36)                 Impression:      Superolateral dislocation of the  left hip arthroplasty.    This report was flagged in Epic as abnormal.      Electronically signed by: Keyon Almanza MD  Date:    11/30/2020  Time:    02:49             Narrative:    EXAMINATION:  XR HIP 2 VIEW LEFT    CLINICAL HISTORY:  Injury, unspecified, initial encounter    TECHNIQUE:  AP view of the pelvis and frog leg lateral view of the left hip were performed.    COMPARISON:  11/15/2020    FINDINGS:  There are bilateral hip arthroplasties present.  There is a superolateral dislocation of the left femoral component relative to the acetabular component.  No definite fracture appreciated.  The right hip demonstrates satisfactory alignment.  There are postoperative changes of the lower lumbar spine.                              X-Rays:   Independently Interpreted Readings:   Other Readings:  Left Hip: Posterior hip dislocation. No fractures. No foreign body.  Left Hip Repeat: Dislocation persists.    Medical Decision Making:   History:   Old Medical Records: I decided to obtain old medical records.  Independently Interpreted Test(s):   I have ordered and independently interpreted X-rays - see prior notes.  Clinical Tests:   Lab Tests: Ordered and Reviewed  Radiological Study: Ordered and Reviewed            Scribe Attestation:   Scribe #1: I performed the above scribed service and the documentation accurately describes the services I performed. I attest to the accuracy of the note.  Scribe #2: I performed the above scribed service and the documentation accurately describes the services I performed. I attest to the accuracy of the note.    Attending Attestation:           Physician Attestation for Scribe:  Physician Attestation Statement for Scribe #1: I, Dr. Cartagena, reviewed documentation, as scribed by Kacie Rhoades in my presence, and it is both accurate and complete.   Physician Attestation Statement for Scribe #2: I, Dr. Cartagena, reviewed documentation, as scribed by Siria Zhang in my presence, and it  is both accurate and complete. I also acknowledge and confirm the content of the note done by Scribe #1.              ED Course as of Nov 30 1359   Mon Nov 30, 2020   0415 Attempt at reduction unsuccessful, will discuss with Orthopedics.     [MA]   0421 Case d/w Dr. Arango, will contact Dr. Mccormick for decision on 2nd attempt in ED vs admission for OR reduction    [MA]   0630 Discussed with Dr Mccormick, planning to go to OR- will admit to his service.     [DM]      ED Course User Index  [DM] Germaine Story MD  [MA] Austen Cartagena MD            Clinical Impression:       ICD-10-CM ICD-9-CM   1. Dislocation of left hip, initial encounter  S73.005A 835.00   2. Trauma  T14.90XA 959.9                                               Austen Cartagena MD  11/30/20 1400

## 2020-11-30 NOTE — ANESTHESIA PREPROCEDURE EVALUATION
11/30/2020  Jayne You is a 80 y.o., female.    Anesthesia Evaluation    I have reviewed the Patient Summary Reports.    I have reviewed the Nursing Notes. I have reviewed the NPO Status.   I have reviewed the Medications.     Review of Systems  Anesthesia Hx:  Hx of high spinal History of prior surgery of interest to airway management or planning: Previous anesthesia: General 2006 Total Hip GA w Dr Mccormick with general anesthesia.  Denies Family Hx of Anesthesia complications.  Personal Hx of Anesthesia complications Slow To Awaken/Delayed Emergence Difficult or Failed Neuraxial Anesthesia (had uneventful placement of SAB, isobaric rop 3cc, had total spinal requiring pressor and ventaltory support)  Social:  Non-Smoker    Hematology/Oncology:  Hematology Normal   Oncology Normal     EENT/Dental:EENT/Dental Normal   Cardiovascular:   Hypertension, well controlled    Pulmonary:   Sleep Apnea, CPAP Recent sleep study, CPAP changed, awaiting new machine, urged to get preop   Renal/:  Renal/ Normal     Hepatic/GI:  Hepatic/GI Normal    Musculoskeletal:   Arthritis  3 prev back surg Spine Disorders: lumbar Degenerative disease and Disc disease    Neurological:   Chronic Pain Syndrome   Endocrine:   Diabetes, type 2    Dermatological:  Skin Normal        Physical Exam  General:  Morbid Obesity    Airway/Jaw/Neck:  Airway Findings: Mouth Opening: Normal Tongue: Normal  General Airway Assessment: Adult  Mallampati: II      Dental:  Dental Findings: Periodontal disease, Mild, Molar caps        Mental Status:  Mental Status Findings:  Cooperative, Alert and Oriented         Anesthesia Plan  Type of Anesthesia, risks & benefits discussed:  Anesthesia Type:  general  Patient's Preference:   Intra-op Monitoring Plan: standard ASA monitors  Intra-op Monitoring Plan Comments:   Post Op Pain Control Plan: per  primary service following discharge from PACU and multimodal analgesia  Post Op Pain Control Plan Comments:   Induction:   IV  Beta Blocker:         Informed Consent: Patient understands risks and agrees with Anesthesia plan.  Questions answered. Anesthesia consent signed with patient.  ASA Score: 3     Day of Surgery Review of History & Physical:    H&P update referred to the surgeon.     Anesthesia Plan Notes: Plan GA as before        Ready For Surgery From Anesthesia Perspective.

## 2020-12-01 LAB
ANION GAP SERPL CALC-SCNC: 8 MMOL/L (ref 8–16)
BASOPHILS # BLD AUTO: 0.03 K/UL (ref 0–0.2)
BASOPHILS NFR BLD: 0.3 % (ref 0–1.9)
BUN SERPL-MCNC: 16 MG/DL (ref 8–23)
CALCIUM SERPL-MCNC: 8.4 MG/DL (ref 8.7–10.5)
CHLORIDE SERPL-SCNC: 99 MMOL/L (ref 95–110)
CO2 SERPL-SCNC: 30 MMOL/L (ref 23–29)
CREAT SERPL-MCNC: 1.2 MG/DL (ref 0.5–1.4)
DIFFERENTIAL METHOD: ABNORMAL
EOSINOPHIL # BLD AUTO: 0.2 K/UL (ref 0–0.5)
EOSINOPHIL NFR BLD: 2.6 % (ref 0–8)
ERYTHROCYTE [DISTWIDTH] IN BLOOD BY AUTOMATED COUNT: 15 % (ref 11.5–14.5)
EST. GFR  (AFRICAN AMERICAN): 50 ML/MIN/1.73 M^2
EST. GFR  (NON AFRICAN AMERICAN): 43 ML/MIN/1.73 M^2
GLUCOSE SERPL-MCNC: 152 MG/DL (ref 70–110)
HCT VFR BLD AUTO: 33.8 % (ref 37–48.5)
HGB BLD-MCNC: 10 G/DL (ref 12–16)
IMM GRANULOCYTES # BLD AUTO: 0.02 K/UL (ref 0–0.04)
IMM GRANULOCYTES NFR BLD AUTO: 0.2 % (ref 0–0.5)
LYMPHOCYTES # BLD AUTO: 1.6 K/UL (ref 1–4.8)
LYMPHOCYTES NFR BLD: 18.3 % (ref 18–48)
MCH RBC QN AUTO: 25.3 PG (ref 27–31)
MCHC RBC AUTO-ENTMCNC: 29.6 G/DL (ref 32–36)
MCV RBC AUTO: 85 FL (ref 82–98)
MONOCYTES # BLD AUTO: 1 K/UL (ref 0.3–1)
MONOCYTES NFR BLD: 11 % (ref 4–15)
NEUTROPHILS # BLD AUTO: 6 K/UL (ref 1.8–7.7)
NEUTROPHILS NFR BLD: 67.6 % (ref 38–73)
NRBC BLD-RTO: 0 /100 WBC
PLATELET # BLD AUTO: 264 K/UL (ref 150–350)
PMV BLD AUTO: 11 FL (ref 9.2–12.9)
POCT GLUCOSE: 158 MG/DL (ref 70–110)
POCT GLUCOSE: 172 MG/DL (ref 70–110)
POTASSIUM SERPL-SCNC: 4.1 MMOL/L (ref 3.5–5.1)
RBC # BLD AUTO: 3.96 M/UL (ref 4–5.4)
SODIUM SERPL-SCNC: 137 MMOL/L (ref 136–145)
WBC # BLD AUTO: 8.82 K/UL (ref 3.9–12.7)

## 2020-12-01 PROCEDURE — 80048 BASIC METABOLIC PNL TOTAL CA: CPT

## 2020-12-01 PROCEDURE — 21400001 HC TELEMETRY ROOM

## 2020-12-01 PROCEDURE — 94660 CPAP INITIATION&MGMT: CPT

## 2020-12-01 PROCEDURE — 97162 PT EVAL MOD COMPLEX 30 MIN: CPT

## 2020-12-01 PROCEDURE — 99900035 HC TECH TIME PER 15 MIN (STAT)

## 2020-12-01 PROCEDURE — 94799 UNLISTED PULMONARY SVC/PX: CPT

## 2020-12-01 PROCEDURE — 25000003 PHARM REV CODE 250: Performed by: NURSE PRACTITIONER

## 2020-12-01 PROCEDURE — 97535 SELF CARE MNGMENT TRAINING: CPT

## 2020-12-01 PROCEDURE — 25000003 PHARM REV CODE 250: Performed by: ORTHOPAEDIC SURGERY

## 2020-12-01 PROCEDURE — 94761 N-INVAS EAR/PLS OXIMETRY MLT: CPT

## 2020-12-01 PROCEDURE — 85025 COMPLETE CBC W/AUTO DIFF WBC: CPT

## 2020-12-01 PROCEDURE — 97165 OT EVAL LOW COMPLEX 30 MIN: CPT

## 2020-12-01 PROCEDURE — 63600175 PHARM REV CODE 636 W HCPCS: Performed by: ORTHOPAEDIC SURGERY

## 2020-12-01 PROCEDURE — 97116 GAIT TRAINING THERAPY: CPT

## 2020-12-01 PROCEDURE — 36415 COLL VENOUS BLD VENIPUNCTURE: CPT

## 2020-12-01 PROCEDURE — 97110 THERAPEUTIC EXERCISES: CPT

## 2020-12-01 RX ORDER — GLIPIZIDE 2.5 MG/1
2.5 TABLET, EXTENDED RELEASE ORAL
Status: DISCONTINUED | OUTPATIENT
Start: 2020-12-01 | End: 2020-12-03 | Stop reason: HOSPADM

## 2020-12-01 RX ADMIN — LISINOPRIL 40 MG: 20 TABLET ORAL at 09:12

## 2020-12-01 RX ADMIN — OXYCODONE HYDROCHLORIDE AND ACETAMINOPHEN 1 TABLET: 5; 325 TABLET ORAL at 05:12

## 2020-12-01 RX ADMIN — OXYCODONE HYDROCHLORIDE AND ACETAMINOPHEN 1 TABLET: 10; 325 TABLET ORAL at 07:12

## 2020-12-01 RX ADMIN — ASPIRIN 81 MG: 81 TABLET, CHEWABLE ORAL at 09:12

## 2020-12-01 RX ADMIN — OXYCODONE HYDROCHLORIDE AND ACETAMINOPHEN 1 TABLET: 10; 325 TABLET ORAL at 02:12

## 2020-12-01 RX ADMIN — OXYCODONE HYDROCHLORIDE AND ACETAMINOPHEN 1 TABLET: 10; 325 TABLET ORAL at 09:12

## 2020-12-01 RX ADMIN — ASPIRIN 81 MG: 81 TABLET, CHEWABLE ORAL at 08:12

## 2020-12-01 RX ADMIN — CEFAZOLIN SODIUM 2 G: 2 SOLUTION INTRAVENOUS at 02:12

## 2020-12-01 RX ADMIN — POLYETHYLENE GLYCOL 3350 17 G: 17 POWDER, FOR SOLUTION ORAL at 09:12

## 2020-12-01 RX ADMIN — CEFAZOLIN SODIUM 2 G: 2 SOLUTION INTRAVENOUS at 08:12

## 2020-12-01 RX ADMIN — DULOXETINE HYDROCHLORIDE 30 MG: 30 CAPSULE, DELAYED RELEASE ORAL at 09:12

## 2020-12-01 RX ADMIN — GLIPIZIDE 2.5 MG: 2.5 TABLET, FILM COATED, EXTENDED RELEASE ORAL at 11:12

## 2020-12-01 RX ADMIN — Medication 9 MG: at 01:12

## 2020-12-01 RX ADMIN — FAMOTIDINE 20 MG: 20 TABLET ORAL at 09:12

## 2020-12-01 RX ADMIN — AMLODIPINE BESYLATE 10 MG: 5 TABLET ORAL at 09:12

## 2020-12-01 NOTE — PLAN OF CARE
Problem: Occupational Therapy Goal  Goal: Occupational Therapy Goal  Description: Goals to be met by: 12/8/2020     Patient will increase functional independence with ADLs by performing:    LE Dressing with Minimal Assistance and Assistive Devices as needed.  Grooming while standing at sink with Stand-by Assistance.  Toileting from bedside commode with Stand-by Assistance for hygiene and clothing management.   Supine to sit with Modified [place].  Toilet transfer to bedside commode with Stand-by Assistance.    Outcome: Ongoing, Progressing   OT eval completed with appropriate goals & POC established. Pt requiring Max A-SBA for ADLS & mobility, would benefit from continued OT services with SNF to maximize safe indep during all daily tasks.

## 2020-12-01 NOTE — PROGRESS NOTES
DALILA faxed HH order and medical records to [facility].    DALILA received call from Sonia at [facility], accepted pt.

## 2020-12-01 NOTE — PT/OT/SLP EVAL
Physical Therapy Evaluation    Patient Name:  Jayne You   MRN:  [id number]    Recommendations:     Discharge Recommendations:  nursing facility, skilled   Discharge Equipment Recommendations: none   Barriers to discharge: Current functional status    Assessment:     Jayne You is a 80 y.o. female admitted with a medical diagnosis of Hip dislocation, left. Pt had left hip UNIQUE on 11/5/2020. Pt was seen at [facility] on 11/15/2020 with posterior dislocation of left hip, which was reduced in the ED. Pt presented to ED complaining of hip dislocation at 7 PM. Pt reports having to crawl on floor to call EMS. She presents with the following impairments/functional limitations:  weakness, impaired endurance, impaired self care skills, impaired functional mobilty, gait instability, impaired balance, decreased lower extremity function, decreased safety awareness, pain, decreased ROM, edema.    PT orders received. PT evaluation completed and goals/POC established. Pt tolerated evaluation well with no adverse reactions. Pt performed transfers and ambulation x 40 ft with rolling walker and CGA. Pt performed HEP with verbal and tactile cues for exercises/technique. Educated patient on UNIQUE precautions, safe use of rolling walker, home exercise program, importance of mobility, and PT plan of care. Pt verbalized understanding. Pt will benefit from skilled PT services to address impairments and functional limitations. Recommend discharge to SNF.     Rehab Prognosis: Good; patient would benefit from acute skilled PT services to address these deficits and reach maximum level of function.    Recent Surgery: Procedure(s) (LRB):  REVISION, TOTAL ARTHROPLASTY, HIP (Left) 1 Day Post-Op    Plan:     During this hospitalization, patient to be seen (QD-BID) to address the identified rehab impairments via gait training, therapeutic activities, therapeutic exercises and progress toward the following goals:    · Plan of Care Expires:   12/08/20    Subjective     Chief Complaint: Pain in left hip with activity.  Patient/Family Comments/goals: No goals stated.   Pain/Comfort:  · Pain Rating 1: 0/10  · Location - Side 1: Left  · Location - Orientation 1: generalized  · Location 1: hip  · Pain Addressed 1: Reposition, Distraction, Cessation of Activity  · Pain Rating Post-Intervention 1: 8/10    Patients cultural, spiritual, Mandaeism conflicts given the current situation: no    Living Environment:  · Pt lives alone in a single story house with no steps to enter. Pt has a walk-in shower with a raised toilet.   · DME owned: Rolling walker, Bedside commode, Shower chair, Straight cane and Rollator  · Upon discharge, patient reports she can get help if needed.  Prior level of function:  · Ambulation: Pt reports primarily ambulating with rollator.   · ADL's: modified Independent  · IADLs: modified Independent  · Pt reports cooking at home, but states she has someone that cleans her home.  · Falls: Pt denies history of falls. Pt reports falling to her knees following onset of pain on 11/30/2020.      Objective:     Communicated with RN (Cheryl) prior to session.  Patient found sitting edge of bed with OT with peripheral IV, bed alarm, SCD, telemetry, hip abduction pillow  upon PT entry to room.    General Precautions: Standard, fall   Orthopedic Precautions:LLE weight bearing as tolerated, LLE anterior precautions   Braces: N/A     Exams:  · Cognition:   · Patient is oriented to person, location, time, and situation.  · Pt follows approximately 100% of multistep commands.    · Mood: Pleasant and cooperative.   · Safety Awareness: Impaired  · Musculoskeletal:  · Posture:  Forward head, rounded shoulder  · LE ROM/Strength:   · R ROM: WNL  · L ROM: WFL  · R Strength:   · Hip flexion: 4+/5  · Knee extension: 5/5  · Dorsiflexion: 5/5   · L Strength:   · Hip flexion: Unable to perform formal testing. Observed to be roughly 3-/5.  · Knee extension:  4+/5  · Dorsiflexion: 5/5   · Neuromuscular:  · Sensation: Intact to light touch bilateral LEs.   · Tone/Reflexes: No impairments identified with functional mobility. No formal testing performed.   · Coordination:  · Toe tapping: Intact  · Balance:   · Static sitting: Good  · Dynamic sitting: Good  · Static standing: Good-  · Dynamic standing: Good-  · Visual-vestibular: No impairments identified with functional mobility. No formal testing performed.  · Integument: Visible skin intact      Functional Mobility:  · Transfers:     · Sit to Stand:  contact guard assistance with rolling walker  · Gait: Pt ambulated 40ft w/ RW and CGA.   · Pt demonstrated decreased rani, decreased endurance, forward trunk flexion, step to gait pattern, and decreased bilateral heel strike/toe off.  · Pt required verbal cues for safe use of RW.    Therapeutic Activities and Exercises:  Patient performed reclined ther ex including bilateral ankle pumps x10, quad sets x10, and glute sets x10. Pt required verbal cues for correct exercise performance/technique.     PT educated patient re:   · PT plan of care/role of PT  · Use of rolling walker  · Fall and safety precautions  · Gait deviations  · Use of call light (don't get up without assistance)  Pt verbalized understanding     The patient is safe to transfer with RN assist.  Patient encouraged to sit up in chair throughout the day to prevent deconditioning.       AM-PAC 6 CLICK MOBILITY  Total Score:18     Patient left up in chair with all lines intact and call button in reach. Cryotherapy and SCD in place and running at end of session.      GOALS:   Multidisciplinary Problems     Physical Therapy Goals        Problem: Physical Therapy Goal    Goal Priority Disciplines Outcome Goal Variances Interventions   Physical Therapy Goal     PT, PT/OT Ongoing, Progressing     Description: Goals to be met by: 12/8/2020    Patient will increase functional independence with mobility by  performin. Supine to sit with supervision.   2. Sit to supine with supervision.   3. Sit<>stand transfer with supervision using rolling walker.   4. Gait > 150 feet with SBA using rolling walker.   5. Verbalize UNIQUE precautions without verbal cues.                           History:     Past Medical History:   Diagnosis Date    Arthritis     Cardiomyopathy     Depression     Diabetes mellitus     Hypertension     Lipids abnormal     Lumbar herniated disc     Obesity     RBBB     Sleep apnea        Past Surgical History:   Procedure Laterality Date    APPENDECTOMY      BACK SURGERY  ,,    BREAST SURGERY Bilateral     reduction    EYE SURGERY Bilateral     HIP ARTHROPLASTY Left 2020    Procedure: ARTHROPLASTY, HIP;  Surgeon: Isaiah Mccormick MD;  Location: [facility];  Service: Orthopedics;  Laterality: Left;    JOINT REPLACEMENT Right     hip    REVISION TOTAL HIP ARTHROPLASTY Left 2020    Procedure: REVISION, TOTAL ARTHROPLASTY, HIP;  Surgeon: Isaiah Mccormick MD;  Location: [facility];  Service: Orthopedics;  Laterality: Left;    TOTAL HIP ARTHROPLASTY Right     TOTAL KNEE ARTHROPLASTY Left 2019    Procedure: ARTHROPLASTY, KNEE, TOTAL;  Surgeon: Isaiah Mccormick MD;  Location: [facility];  Service: Orthopedics;  Laterality: Left;       Time Tracking:     PT Received On: 20  PT Start Time: 829     PT Stop Time: 0846  PT Total Time (min): 17 min     Overlap with OT for portions of session due to complex nature of patient and for safety with mobility to decrease fall risk for patient and caregiver injury requiring two skilled therapists to provide interventions.     Billable Minutes: Evaluation 17      Catherine Luna, KELLI  2020

## 2020-12-01 NOTE — PT/OT/SLP PROGRESS
Physical Therapy Treatment    Patient Name:  Jayne You   MRN:  [id number]    Recommendations:     Discharge Recommendations:  nursing facility, skilled   Discharge Equipment Recommendations: none   Barriers to discharge: Current functional status    Assessment:     Jayne You is a 80 y.o. female admitted with a medical diagnosis of Hip dislocation, left.  She presents with the following impairments/functional limitations:  weakness, impaired endurance, impaired self care skills, impaired functional mobilty, gait instability, impaired balance, decreased lower extremity function, decreased safety awareness, pain, decreased ROM, edema, orthopedic precautions.    Pt tolerated treatment well with no adverse reactions. Pt performed transfers and ambulation x 100 ft with rolling walker and CGA-Min A. Demonstrated improved endurance and ambulation. Pt continues to be limited by pain. Pt performed HEP with verbal and tactile cues for exercises/technique. Educated patient on UNIQUE precautions, safe use of rolling walker, home exercise program, importance of mobility, and PT plan of care. Pt verbalized understanding. Pt will benefit from skilled PT services to address impairments and functional limitations. Recommend discharge to SNF.    Rehab Prognosis: Good; patient would benefit from acute skilled PT services to address these deficits and reach maximum level of function.    Recent Surgery: Procedure(s) (LRB):  REVISION, TOTAL ARTHROPLASTY, HIP (Left) 1 Day Post-Op    Plan:     During this hospitalization, patient to be seen (QD-BID) to address the identified rehab impairments via gait training, therapeutic activities, therapeutic exercises and progress toward the following goals:    · Plan of Care Expires:  12/08/20    Subjective     Chief Complaint: Pain in left hip.   Patient/Family Comments/goals: No goal stated.   Pain/Comfort:  Pain Rating 1: 9/10  Location - Side 1: Left  Location - Orientation 1:  generalized  Location 1: hip  Pain Addressed 1: Reposition, Distraction, Cessation of Activity  Pain Rating Post-Intervention 1: 9/10      Objective:     Communicated with RN (Cheryl) prior to session.  Patient found up in chair with cryotherapy, SCD, peripheral IV, telemetry upon PT entry to room.     General Precautions: Standard, fall   Orthopedic Precautions:LLE weight bearing as tolerated, LLE anterior precautions   Braces: N/A     Functional Mobility: Gait belt and non-slip socks donned prior to mobility. Surgical mask donned for ambulation in hallway per current infection control policy.  · Bed Mobility:     · Sit to Supine: minimum assistance  · Transfers:     · Sit to Stand:  contact guard assistance with rolling walker  · Gait: Pt ambulated 100ft w/ RW and CGA.   · Pt demonstrated improved endurance as compared to last session.  · Pt demonstrated decreased rani, step to gait pattern, decreased surgical LE ROM, decreased surgical LE heel strike/toe off, and forward trunk flexion.   · Pt required maximum verbal cues for safe use of RW.      AM-PAC 6 CLICK MOBILITY  Turning over in bed (including adjusting bedclothes, sheets and blankets)?: 3  Sitting down on and standing up from a chair with arms (e.g., wheelchair, bedside commode, etc.): 3  Moving from lying on back to sitting on the side of the bed?: 3  Moving to and from a bed to a chair (including a wheelchair)?: 3  Need to walk in hospital room?: 3  Climbing 3-5 steps with a railing?: 2  Basic Mobility Total Score: 17       Therapeutic Activities and Exercises:   Patient performed reclined ther ex including bilateral ankle pumps x10, quad sets x10, and glute sets x10. Pt performed seated LAQ x10 w/ L LE only. Pt required verbal and tactile cues for correct exercise performance/technique.    PT educated patient re:   · PT plan of care/role of PT  · Use of rolling walker  · UNIQUE precautions  · Importance of mobility  · Fall and safety  precautions  · Gait deviations  · Use of call light (don't get up without assistance)  Pt verbalized understanding     The patient is safe to transfer with RN assist.  Patient encouraged to sit up in chair throughout the day to prevent deconditioning.       Patient left supine with all lines intact, call button in reach, bed alarm on and RN notified. Hip abduction pillow in place. Cryotherapy and SCD/FCD in place and running at end of session.      GOALS:   Multidisciplinary Problems     Physical Therapy Goals        Problem: Physical Therapy Goal    Goal Priority Disciplines Outcome Goal Variances Interventions   Physical Therapy Goal     PT, PT/OT Ongoing, Progressing     Description: Goals to be met by: 2020    Patient will increase functional independence with mobility by performin. Supine to sit with supervision.   2. Sit to supine with supervision.   3. Sit<>stand transfer with supervision using rolling walker.   4. Gait > 150 feet with SBA using rolling walker.   5. Verbalize UNIQUE precautions without verbal cues.                           Time Tracking:     PT Received On: 20  PT Start Time: 1058     PT Stop Time: 1121  PT Total Time (min): 23 min     Billable Minutes: Gait Training 13 and Therapeutic Exercise 10    Treatment Type: Treatment  PT/PTA: PT     PTA Visit Number: 0     Catherine Luna [facility]  2020

## 2020-12-01 NOTE — PLAN OF CARE
SW met with pt at bedside to complete discharge assessment, verified PCP and pt uses [facility] on [address].  No POA or LW.  DaughterThuan will provide transportation home.  Pt has RW, rollator, BSC, TTB, CPAP and straight cane.  SW gave pt a list of  agencies and pt stated she had [facility], previously, and would like to use them again and signed choice form.       12/01/20 1156   Discharge Assessment   Assessment Type Discharge Planning Assessment   Confirmed/corrected address and phone number on facesheet? Yes   Assessment information obtained from? Patient   Communicated expected length of stay with patient/caregiver no   Prior to hospitilization cognitive status: Alert/Oriented   Prior to hospitalization functional status: Independent;Assistive Equipment   Current cognitive status: Alert/Oriented   Current Functional Status: Assistive Equipment;Needs Assistance   Lives With alone   Able to Return to Prior Arrangements yes   Is patient able to care for self after discharge? Unable to determine at this time (comments)   Who are your caregiver(s) and their phone number(s)? Daughter and friends   Patient currently being followed by outpatient case management? No   Patient currently receives any other outside agency services? No   Equipment Currently Used at Home walker, rolling;rollator;bedside commode;bath bench;cane, straight;CPAP   Do you have any problems affording any of your prescribed medications? No   Is the patient taking medications as prescribed? yes   Does the patient have transportation home? Yes   Transportation Anticipated family or friend will provide   Does the patient receive services at the [facility]? No   Discharge Plan A Home Health   DME Needed Upon Discharge  none   Patient/Family in Agreement with Plan yes

## 2020-12-01 NOTE — PROGRESS NOTES
MED  Progress Note    Admit Date: 11/30/2020   LOS: 1 day     SUBJECTIVE:     Follow-up For:  Hip dislocation, left    Interval History:     Restless night with pain control.  Discussed with Dr. Mccormick at bedside this morning.  No chest pain, shortness of breath, calf tenderness.    Review of Systems:  Constitutional: No fever or chills  Respiratory: No cough or shortness of breath  Cardiovascular: No chest pain or palpitations  Gastrointestinal: No nausea or vomiting  Neurological: No confusion or weakness    OBJECTIVE:     Vital Signs Range (Last 24H):  /66   Pulse (!) 111   Temp 98.5 °F (36.9 °C) (Oral)   Resp 16   Wt 114 kg (251 lb 5.2 oz)   SpO2 95%   BMI 43.14 kg/m²     Temp:  [97.7 °F (36.5 °C)-98.6 °F (37 °C)]   Pulse:  []   Resp:  [14-18]   BP: (106-142)/()   SpO2:  [95 %-99 %]     I & O (Last 24H):    Intake/Output Summary (Last 24 hours) at 12/1/2020 1006  Last data filed at 12/1/2020 0600  Gross per 24 hour   Intake 2530 ml   Output 825 ml   Net 1705 ml       Physical Exam:  General appearance: Well developed, well nourished  Eyes:  Conjunctivae/corneas clear. PERRL.  Lungs: Normal respiratory effort,   clear to auscultation bilaterally   Heart: Regular rate and rhythm, S1, S2 normal, no murmur, rub or anna.  Abdomen: Soft, non-tender non-distended; bowel sounds normal; no masses,  no organomegaly, obese  Extremities: No cyanosis or clubbing. No edema.    Skin: Skin color, texture, turgor normal. No rashes or lesions  Neurologic: Normal strength and tone. No focal numbness or weakness   Left hip clear dry and intact    Laboratory Data:  Recent Labs   Lab 12/01/20  0445   WBC 8.82   RBC 3.96*   HGB 10.0*   HCT 33.8*      MCV 85   MCH 25.3*   MCHC 29.6*       BMP:   Recent Labs   Lab 12/01/20  0445   *      K 4.1   CL 99   CO2 30*   BUN 16   CREATININE 1.2   CALCIUM 8.4*     Lab Results   Component Value Date    CALCIUM 8.4 (L) 12/01/2020       Lab Results    Component Value Date    CALCIUM 8.4 (L) 12/01/2020     POCT Glucose   Date Value Ref Range Status   12/01/2020 158 (H) 70 - 110 mg/dL Final   11/30/2020 217 (H) 70 - 110 mg/dL Final     No results found for: URICACID    BNP  No results for input(s): BNP, BNPTRIAGEBLO in the last 168 hours.    Medications:  Medication list was reviewed and changes noted under Assessment/Plan.    Diagnostic Results:        ASSESSMENT/PLAN:        1. S/ P Left hip Revision (M16.9):  Per ortho/therapy teams.  2. HTN (I10):  Home meds.  Control pain.  Hydralazine PRN.  3. DM (E11.65):  Oral meds reordered/ADA/SSI.  Recommend GLP by PCP for weight loss.  4. Obese:  As above  5. KHAI (G47.33):  CPAP ordered.  6. Cardiomyopathy:  Placed on tele without need events noted  7. DVT :  ASA BID per ortho         Medically stable

## 2020-12-01 NOTE — PROGRESS NOTES
Postop day 1.  Left hip revision for dislocation.  So far doing well.  Has not had therapy at because of the late timing of her surgery yesterday evening.  Work on physical therapy today. Plan discharge home tomorrow with home health.  Neurovascular intact.  Leg length appropriate.

## 2020-12-01 NOTE — PLAN OF CARE
Patient on oxygen with documented flow. Will attempt to wean per O2 order protocol.  Will continue to monitor.      Pt doing IS @ 1000  L. Will cont to encourage deep breathing and coughing  Patient on documented CPAP settings QHS, with alarms set and functioning.

## 2020-12-01 NOTE — PT/OT/SLP EVAL
Occupational Therapy   Evaluation    Name: Jayne You  MRN: [id number]  Admitting Diagnosis:  Hip dislocation, left 1 Day Post-Op    Recommendations:     Discharge Recommendations: nursing facility, skilled  Discharge Equipment Recommendations:  hip kit  Barriers to discharge:  Decreased caregiver support    Assessment:     Jayne You is a 80 y.o. female with a medical diagnosis of Hip dislocation, left.  She presents agreeable to therapy. Performance deficits affecting function: weakness, impaired endurance, impaired self care skills, impaired functional mobilty, gait instability, impaired balance, decreased lower extremity function, pain, decreased ROM, orthopedic precautions, impaired cardiopulmonary response to activity.      Rehab Prognosis: Good; patient would benefit from acute skilled OT services to address these deficits and reach maximum level of function.       Plan:     Patient to be seen 6 x/week to address the above listed problems via self-care/home management, therapeutic exercises, therapeutic activities  · Plan of Care Expires: 12/31/20  · Plan of Care Reviewed with: patient    Subjective     Chief Complaint: I just went through this last month   Patient/Family Comments/goals: to see how I do today and tomorrow     Occupational Profile:  Living Environment: pt lives alone in single story home   Previous level of function: Mod I ADLS & mobility s/p recent THR  Roles and Routines: cares for self, home mgmt   Equipment Used at Home:  bedside commode, walker, rolling, cane, straight, shower chair, rollator  Assistance upon Discharge: reports can get someone to assist 24/7 if needed     Pain/Comfort:  · Pain Rating 1: 0/10  · Location - Side 1: Left  · Location - Orientation 1: generalized  · Location 1: hip  · Pain Rating Post-Intervention 1: 8/10    Patients cultural, spiritual, Oriental orthodox conflicts given the current situation: no    Objective:     Communicated with: BRITNEY Luna prior to  session.  Patient found HOB elevated with peripheral IV, bed alarm, SCD, hip abduction pillow, telemetry upon OT entry to room.    General Precautions: Standard, fall   Orthopedic Precautions:LLE weight bearing as tolerated, LLE anterior precautions   Braces: N/A     Occupational Performance:    Bed Mobility:    · Patient completed Supine to Sit with minimum assistance and for LE mgmt    Functional Mobility/Transfers:  · Patient completed Sit <> Stand Transfer with contact guard assistance  with  rolling walker   · Functional Mobility: CGA RW within room      Activities of Daily Living:  · Grooming: supervision following setup to wash face seated EOB  · Upper Body Dressing: supervision following setup to change gown seated EOB   · Lower Body Dressing: Total A to don socks EOB    Cognitive/Visual Perceptual:  Cognitive/Psychosocial Skills:     -       Oriented to: Person, Place, Time and Situation   -       Follows Commands/attention:Follows multistep  commands  -       Safety awareness/insight to disability: intact     Physical Exam:  Upper Extremity Range of Motion:     -       Right Upper Extremity: WFL  -       Left Upper Extremity: WFL  Upper Extremity Strength:    -       Right Upper Extremity: WFL  -       Left Upper Extremity: WFL    AMPAC 6 Click ADL:  AMPAC Total Score: 17    Treatment & Education:  Pt participated in OT session with ADLS & functional mobility performed as documented above.      Education provided on role of OT including safe performance of self-care tasks, mobility techniques, safe use of DME, and encouragement of mobilization during hospitalization to prevent/limit deconditioning as well as discharge recommendations   Education:    Patient left up in chair with all lines intact, call button in reach and PT present    GOALS:   Multidisciplinary Problems     Occupational Therapy Goals        Problem: Occupational Therapy Goal    Goal Priority Disciplines Outcome Interventions   Occupational  Therapy Goal     OT, PT/OT Ongoing, Progressing    Description: Goals to be met by: 12/8/2020     Patient will increase functional independence with ADLs by performing:    LE Dressing with Minimal Assistance and Assistive Devices as needed.  Grooming while standing at sink with Stand-by Assistance.  Toileting from bedside commode with Stand-by Assistance for hygiene and clothing management.   Supine to sit with Modified [place].  Toilet transfer to bedside commode with Stand-by Assistance.                     History:     Past Medical History:   Diagnosis Date    Arthritis     Cardiomyopathy     Depression     Diabetes mellitus     Hypertension     Lipids abnormal     Lumbar herniated disc     Obesity     RBBB     Sleep apnea        Past Surgical History:   Procedure Laterality Date    APPENDECTOMY      BACK SURGERY  2014,2016,2017    BREAST SURGERY Bilateral     reduction    EYE SURGERY Bilateral     HIP ARTHROPLASTY Left 11/5/2020    Procedure: ARTHROPLASTY, HIP;  Surgeon: Isaiah Mccormick MD;  Location: [facility];  Service: Orthopedics;  Laterality: Left;    JOINT REPLACEMENT Right 2006    hip    TOTAL HIP ARTHROPLASTY Right 2006    TOTAL KNEE ARTHROPLASTY Left 7/18/2019    Procedure: ARTHROPLASTY, KNEE, TOTAL;  Surgeon: Isaiah Mccormick MD;  Location: [facility];  Service: Orthopedics;  Laterality: Left;       Time Tracking:     OT Date of Treatment: 12/01/20  OT Start Time: 0811  OT Stop Time: 0838  OT Total Time (min): 27 min    Billable Minutes:Evaluation 15  Self Care/Home Management 12   Overlap with PT for portions of session due to complex nature of patient and for safety with mobility and performance of self-care tasks to decrease fall risk as well as patient and caregiver injury as pt requires two skilled therapists to provide interventions.      Elsy Pimentel, OT  12/1/2020

## 2020-12-01 NOTE — PLAN OF CARE
Problem: Physical Therapy Goal  Goal: Physical Therapy Goal  Description: Goals to be met by: 2020    Patient will increase functional independence with mobility by performin. Supine to sit with supervision.   2. Sit to supine with supervision.   3. Sit<>stand transfer with supervision using rolling walker.   4. Gait > 150 feet with SBA using rolling walker.   5. Verbalize UNIQUE precautions without verbal cues.    Outcome: Ongoing, Progressing    PT orders received. PT evaluation completed and goals/POC established. Pt tolerated evaluation well with no adverse reactions. Pt performed transfers and ambulation x 40 ft with rolling walker and CGA. Pt performed HEP with verbal and tactile cues for exercises/technique. Educated patient on UNIQUE precautions, safe use of rolling walker, home exercise program, importance of mobility, and PT plan of care. Pt verbalized understanding. Pt will benefit from skilled PT services to address impairments and functional limitations. Recommend discharge to SNF.

## 2020-12-01 NOTE — PLAN OF CARE
Patient remained free from falls/injury throughout shift.  No acute changes in status.  No complaints of pain.  Bed locked in lowest position.  Side rails up x2.  Call bell within reach.  Purposeful rounding maintained throughout shift.

## 2020-12-02 LAB
ANION GAP SERPL CALC-SCNC: 8 MMOL/L (ref 8–16)
BASOPHILS # BLD AUTO: 0.03 K/UL (ref 0–0.2)
BASOPHILS NFR BLD: 0.4 % (ref 0–1.9)
BUN SERPL-MCNC: 23 MG/DL (ref 8–23)
CALCIUM SERPL-MCNC: 8.4 MG/DL (ref 8.7–10.5)
CHLORIDE SERPL-SCNC: 97 MMOL/L (ref 95–110)
CO2 SERPL-SCNC: 29 MMOL/L (ref 23–29)
CREAT SERPL-MCNC: 1 MG/DL (ref 0.5–1.4)
DIFFERENTIAL METHOD: ABNORMAL
EOSINOPHIL # BLD AUTO: 0.3 K/UL (ref 0–0.5)
EOSINOPHIL NFR BLD: 3.3 % (ref 0–8)
ERYTHROCYTE [DISTWIDTH] IN BLOOD BY AUTOMATED COUNT: 15.1 % (ref 11.5–14.5)
EST. GFR  (AFRICAN AMERICAN): >60 ML/MIN/1.73 M^2
EST. GFR  (NON AFRICAN AMERICAN): 54 ML/MIN/1.73 M^2
GLUCOSE SERPL-MCNC: 153 MG/DL (ref 70–110)
HCT VFR BLD AUTO: 32.3 % (ref 37–48.5)
HGB BLD-MCNC: 9.6 G/DL (ref 12–16)
IMM GRANULOCYTES # BLD AUTO: 0.03 K/UL (ref 0–0.04)
IMM GRANULOCYTES NFR BLD AUTO: 0.4 % (ref 0–0.5)
LYMPHOCYTES # BLD AUTO: 1.6 K/UL (ref 1–4.8)
LYMPHOCYTES NFR BLD: 20.2 % (ref 18–48)
MCH RBC QN AUTO: 24.9 PG (ref 27–31)
MCHC RBC AUTO-ENTMCNC: 29.7 G/DL (ref 32–36)
MCV RBC AUTO: 84 FL (ref 82–98)
MONOCYTES # BLD AUTO: 0.9 K/UL (ref 0.3–1)
MONOCYTES NFR BLD: 11.9 % (ref 4–15)
NEUTROPHILS # BLD AUTO: 5 K/UL (ref 1.8–7.7)
NEUTROPHILS NFR BLD: 63.8 % (ref 38–73)
NRBC BLD-RTO: 0 /100 WBC
PLATELET # BLD AUTO: 238 K/UL (ref 150–350)
PMV BLD AUTO: 11.3 FL (ref 9.2–12.9)
POCT GLUCOSE: 180 MG/DL (ref 70–110)
POCT GLUCOSE: 187 MG/DL (ref 70–110)
POTASSIUM SERPL-SCNC: 3.9 MMOL/L (ref 3.5–5.1)
RBC # BLD AUTO: 3.85 M/UL (ref 4–5.4)
SODIUM SERPL-SCNC: 134 MMOL/L (ref 136–145)
WBC # BLD AUTO: 7.88 K/UL (ref 3.9–12.7)

## 2020-12-02 PROCEDURE — 80048 BASIC METABOLIC PNL TOTAL CA: CPT

## 2020-12-02 PROCEDURE — 25000003 PHARM REV CODE 250: Performed by: ORTHOPAEDIC SURGERY

## 2020-12-02 PROCEDURE — 36415 COLL VENOUS BLD VENIPUNCTURE: CPT

## 2020-12-02 PROCEDURE — 94761 N-INVAS EAR/PLS OXIMETRY MLT: CPT

## 2020-12-02 PROCEDURE — 21400001 HC TELEMETRY ROOM

## 2020-12-02 PROCEDURE — 97116 GAIT TRAINING THERAPY: CPT

## 2020-12-02 PROCEDURE — 97535 SELF CARE MNGMENT TRAINING: CPT

## 2020-12-02 PROCEDURE — 25000003 PHARM REV CODE 250: Performed by: NURSE PRACTITIONER

## 2020-12-02 PROCEDURE — 94799 UNLISTED PULMONARY SVC/PX: CPT

## 2020-12-02 PROCEDURE — 85025 COMPLETE CBC W/AUTO DIFF WBC: CPT

## 2020-12-02 PROCEDURE — 99900035 HC TECH TIME PER 15 MIN (STAT)

## 2020-12-02 PROCEDURE — 94660 CPAP INITIATION&MGMT: CPT

## 2020-12-02 RX ADMIN — POLYETHYLENE GLYCOL 3350 17 G: 17 POWDER, FOR SOLUTION ORAL at 09:12

## 2020-12-02 RX ADMIN — FAMOTIDINE 20 MG: 20 TABLET ORAL at 10:12

## 2020-12-02 RX ADMIN — GLIPIZIDE 2.5 MG: 2.5 TABLET, FILM COATED, EXTENDED RELEASE ORAL at 09:12

## 2020-12-02 RX ADMIN — ASPIRIN 81 MG: 81 TABLET, CHEWABLE ORAL at 09:12

## 2020-12-02 RX ADMIN — ASPIRIN 81 MG: 81 TABLET, CHEWABLE ORAL at 10:12

## 2020-12-02 RX ADMIN — OXYCODONE HYDROCHLORIDE AND ACETAMINOPHEN 1 TABLET: 10; 325 TABLET ORAL at 09:12

## 2020-12-02 RX ADMIN — OXYCODONE HYDROCHLORIDE AND ACETAMINOPHEN 1 TABLET: 10; 325 TABLET ORAL at 10:12

## 2020-12-02 RX ADMIN — OXYCODONE HYDROCHLORIDE AND ACETAMINOPHEN 1 TABLET: 10; 325 TABLET ORAL at 03:12

## 2020-12-02 RX ADMIN — OXYCODONE HYDROCHLORIDE AND ACETAMINOPHEN 1 TABLET: 10; 325 TABLET ORAL at 02:12

## 2020-12-02 RX ADMIN — DULOXETINE HYDROCHLORIDE 30 MG: 30 CAPSULE, DELAYED RELEASE ORAL at 09:12

## 2020-12-02 NOTE — PLAN OF CARE
Problem: Occupational Therapy Goal  Goal: Occupational Therapy Goal  Description: Goals to be met by: 12/8/2020     Patient will increase functional independence with ADLs by performing:    LE Dressing with Minimal Assistance and Assistive Devices as needed.  Grooming while standing at sink with Stand-by Assistance.  Toileting from bedside commode with Stand-by Assistance for hygiene and clothing management. - MET 12/2/2020   Supine to sit with Modified [place].  Toilet transfer to bedside commode with Stand-by Assistance.- MET 12/2/2020     Outcome: Ongoing, Progressing   Pt making steady progress towards goals, POC remains appropriate.  Pt with increased indep during ADLS & mobility, per pt discussed d/c plans with dtr and requesting to return home with  services.

## 2020-12-02 NOTE — PROGRESS NOTES
Postop day 2.  Left hip revision for dislocation.  Still having low blood pressure.  Receiving some more fluids.  Blood count stable. Cultures negative. I am not comfortable with her going home with this level of blood pressure especially in this patient with her medical history.  Will keep another day.  Continue medical management. Still the plan is to have her go home with home health.  Hip clear.  Neurovascular intact.

## 2020-12-02 NOTE — PROGRESS NOTES
MED  Progress Note    Admit Date: 11/30/2020   LOS: 2 days     SUBJECTIVE:     Follow-up For:  Hip dislocation, left    Interval History:     Uneventful night.   Sitting in chair w/o issues.  Mild hypotension due to getting BP meds without regards to hold parameters.  No CP/SOB/Calf pain.  Discussed with ortho.     Review of Systems:  Constitutional: No fever or chills  Respiratory: No cough or shortness of breath  Cardiovascular: No chest pain or palpitations  Gastrointestinal: No nausea or vomiting  Neurological: No confusion or weakness    OBJECTIVE:     Vital Signs Range (Last 24H):  BP (!) 106/54 (BP Location: Right arm, Patient Position: Lying)   Pulse 96   Temp 98 °F (36.7 °C) (Oral)   Resp 18   Wt 114 kg (251 lb 5.2 oz)   SpO2 95%   BMI 43.14 kg/m²     Temp:  [97.6 °F (36.4 °C)-98.5 °F (36.9 °C)]   Pulse:  []   Resp:  [16-22]   BP: ()/(47-58)   SpO2:  [93 %-95 %]     I & O (Last 24H):    Intake/Output Summary (Last 24 hours) at 12/2/2020 0903  Last data filed at 12/2/2020 0500  Gross per 24 hour   Intake 480 ml   Output 450 ml   Net 30 ml       Physical Exam:  General appearance: Well developed, well nourished  Eyes:  Conjunctivae/corneas clear. PERRL.  Lungs: Normal respiratory effort,   clear to auscultation bilaterally   Heart: Regular rate and rhythm, S1, S2 normal, no murmur, rub or anna.  Abdomen: Soft, non-tender non-distended; bowel sounds normal; no masses,  no organomegaly, obese  Extremities: No cyanosis or clubbing. No edema.    Skin: Skin color, texture, turgor normal. No rashes or lesions  Neurologic: Normal strength and tone. No focal numbness or weakness   Left hip clear dry and intact    Laboratory Data:  Recent Labs   Lab 12/02/20  0409   WBC 7.88   RBC 3.85*   HGB 9.6*   HCT 32.3*      MCV 84   MCH 24.9*   MCHC 29.7*       BMP:   Recent Labs   Lab 12/02/20  0409   *   *   K 3.9   CL 97   CO2 29   BUN 23   CREATININE 1.0   CALCIUM 8.4*     Lab Results    Component Value Date    CALCIUM 8.4 (L) 12/02/2020       Lab Results   Component Value Date    CALCIUM 8.4 (L) 12/02/2020     POCT Glucose   Date Value Ref Range Status   12/02/2020 180 (H) 70 - 110 mg/dL Final   12/01/2020 172 (H) 70 - 110 mg/dL Final   12/01/2020 158 (H) 70 - 110 mg/dL Final   11/30/2020 217 (H) 70 - 110 mg/dL Final     No results found for: URICACID    BNP  No results for input(s): BNP, BNPTRIAGEBLO in the last 168 hours.    Medications:  Medication list was reviewed and changes noted under Assessment/Plan.    Diagnostic Results:        ASSESSMENT/PLAN:        1. S/ P Left hip Revision (M16.9):  Per ortho/therapy teams.  2. HTN (I10):  Home meds on hold due to hypotension.  Explained to pt not to restart at home until SBP >140.  Educated staff once again.   3. DM (E11.65):  Oral meds reordered/ADA/SSI.  Recommend GLP by PCP for weight loss.  4. Obese:  As above  5. KHAI (G47.33):  CPAP ordered.  6. Cardiomyopathy:  Placed on tele without need events noted  7. DVT :  ASA BID per ortho         Medically stable

## 2020-12-02 NOTE — PT/OT/SLP PROGRESS
Physical Therapy Treatment    Patient Name:  Jayne You   MRN:  [id number]    Recommendations:     Discharge Recommendations:  nursing facility, skilled   Discharge Equipment Recommendations: none   Barriers to discharge: Current functional status    Assessment:     Jayne You is a 80 y.o. female admitted with a medical diagnosis of Hip dislocation, left.  She presents with the following impairments/functional limitations:  weakness, impaired endurance, impaired self care skills, impaired functional mobilty, gait instability, impaired balance, decreased lower extremity function, pain, decreased ROM, orthopedic precautions.    Pt tolerated treatment well with no adverse reactions. Pt performed transfers and ambulation x 120 ft with rolling walker and SBA. Demonstrated improved performance of sit <> stand. Pt continues to be limited by pain. Pt performed HEP with verbal and tactile cues for exercises/technique. Educated patient on UNIQUE precautions, safe use of rolling walker, home exercise program, importance of mobility, and PT plan of care. Pt verbalized understanding. Pt will benefit from skilled PT services to address impairments and functional limitations.     Rehab Prognosis: Good; patient would benefit from acute skilled PT services to address these deficits and reach maximum level of function.    Recent Surgery: Procedure(s) (LRB):  REVISION, TOTAL ARTHROPLASTY, HIP (Left) 2 Days Post-Op    Plan:     During this hospitalization, patient to be seen (QD-BID) to address the identified rehab impairments via gait training, therapeutic exercises, therapeutic activities and progress toward the following goals:    · Plan of Care Expires:  12/08/20    Subjective     Chief Complaint: Pain with IV.   Patient/Family Comments/goals: Pt initially reported no pain in left hip, but reported it was 8/10 following ambulation.  Pain/Comfort:  · Pain Rating 1: 8/10  · Location - Side 1: Left  · Location - Orientation 1:  proximal  · Location 1: antecubital  · Pain Addressed 1: Reposition, Distraction, Cessation of Activity  · Pain Rating Post-Intervention 1: (No rating given)  · Pain Rating 2: 0/10  · Location - Side 2: Left  · Location - Orientation 2: generalized  · Location 2: hip  · Pain Addressed 2: Reposition, Distraction, Cessation of Activity  · Pain Rating Post-Intervention 2: 8/10      Objective:     Communicated with RN (Cheryl) prior to session.  Patient found up in chair with peripheral IV, telemetry upon PT entry to room.     General Precautions: Standard, fall   Orthopedic Precautions:LLE weight bearing as tolerated, LLE anterior precautions   Braces: N/A     Functional Mobility: Gait belt and non-slip socks donned prior to mobility. Surgical mask donned for ambulation in hallway per current infection control policy.  · Bed Mobility:     · Sit to Supine: minimum assistance  · Pt required assistance w/ L LE.   · Transfers:     · Sit to Stand:  supervision with rolling walker  · Gait: Pt ambulated 120ft w/ RW and SBA.   · Pt demonstrated decreased rani, decreased endurance, and decreased bilatearl heel strike/toe off.   · Pt took two standing rest breaks lasting less than 10 seconds.      AM-PAC 6 CLICK MOBILITY  Turning over in bed (including adjusting bedclothes, sheets and blankets)?: 3  Sitting down on and standing up from a chair with arms (e.g., wheelchair, bedside commode, etc.): 4  Moving from lying on back to sitting on the side of the bed?: 3  Moving to and from a bed to a chair (including a wheelchair)?: 3  Need to walk in hospital room?: 3  Climbing 3-5 steps with a railing?: 3  Basic Mobility Total Score: 19       Therapeutic Activities and Exercises:  Patient performed reclined ther ex including LAQ x10, ankle pumps x10, quad sets x10, and glute sets x10.     PT educated patient re:   · PT plan of care/role of PT  · Use of rolling walker  · Importance of mobility  · UNIQUE precautions  · Fall and safety  precautions  · Gait deviations  · Use of call light (don't get up without assistance)  Pt verbalized understanding     The patient is safe to transfer with RN assist.  Patient encouraged to sit up in chair throughout the day to prevent deconditioning.       Patient left supine with all lines intact and call button in reach..    GOALS:   Multidisciplinary Problems     Physical Therapy Goals        Problem: Physical Therapy Goal    Goal Priority Disciplines Outcome Goal Variances Interventions   Physical Therapy Goal     PT, PT/OT Ongoing, Progressing     Description: Goals to be met by: 2020    Patient will increase functional independence with mobility by performin. Supine to sit with supervision.   2. Sit to supine with supervision.   3. Sit<>stand transfer with supervision using rolling walker. GOAL MET 2020  4. Gait > 150 feet with SBA using rolling walker.   5. Verbalize UNIQUE precautions without verbal cues.                           Time Tracking:     PT Received On: 20  PT Start Time: 1100     PT Stop Time: 1120  PT Total Time (min): 20 min     Billable Minutes: Gait Training 20    Treatment Type: Treatment  PT/PTA: PT     PTA Visit Number: 0     KELLI Montero  2020

## 2020-12-02 NOTE — PT/OT/SLP PROGRESS
Occupational Therapy   Treatment    Name: Jayne You  MRN: [id number]  Admitting Diagnosis:  Hip dislocation, left  2 Days Post-Op    Recommendations:     Discharge Recommendations: home health PT, home health OT(per pt request d/c home with 24 hour care & HH)  Discharge Equipment Recommendations:  none  Barriers to discharge:  Decreased caregiver support    Assessment:     Jayne You is a 80 y.o. female with a medical diagnosis of Hip dislocation, left.  She presents with c/o pain prior to session, requesting to d/c home with family assist & resumption of HH services.. Performance deficits affecting function are weakness, impaired endurance, impaired self care skills, impaired functional mobilty, gait instability, impaired balance, pain, impaired cardiopulmonary response to activity, orthopedic precautions.     Pt with increased indep during ADLS & mobility, per pt discussed d/c plans with dtr and requesting to return home with HH services.             Rehab Prognosis:  Good; patient would benefit from acute skilled OT services to address these deficits and reach maximum level of function.       Plan:     Patient to be seen 6 x/week to address the above listed problems via self-care/home management, therapeutic activities, therapeutic exercises  · Plan of Care Expires: 12/31/20  · Plan of Care Reviewed with: patient    Subjective     Pain/Comfort:  · Pain Rating 1: 8/10  · Location - Side 1: Left  · Location - Orientation 1: generalized  · Location 1: hip  · Pain Addressed 1: Pre-medicate for activity, Reposition, Distraction, Cessation of Activity  · Pain Rating Post-Intervention 1: (did not rate)    Objective:     Communicated with: BRITNEY Luna prior to session.  Patient found up in chair with peripheral IV, telemetry upon OT entry to room.    General Precautions: Standard, fall   Orthopedic Precautions:LLE weight bearing as tolerated, LLE anterior precautions   Braces: N/A     Occupational Performance:      Bed Mobility:    · DNT    Functional Mobility/Transfers:  · Patient completed Sit <> Stand Transfer with stand by assistance  with  rolling walker   · Patient completed Toilet Transfer Step Transfer technique with stand by assistance with  rolling walker and bedside commode  · Functional Mobility: SBA RW within room chair>BSC>chair    Activities of Daily Living:  · Grooming: setup wash face & hands in chair .  · Lower Body Dressing: total assistance don socks, reports dtr will assist at home and can use hip kit  · Toileting: stand by assistance [employer] standing from BSC   · Declined UB dressing      AMPAC 6 Click ADL: 17    Treatment & Education:  Pt participated in OT tx session with ADLS & functional mobility performed as documented above.      Education provided on role of OT including safe performance of self-care tasks, mobility techniques, safe use of DME, and encouragement of mobilization during hospitalization to prevent/limit deconditioning as well as discharge recommendations     Patient left up in chair with all lines intact, call button in reach and RN & PT notifiedEducation:      GOALS:   Multidisciplinary Problems     Occupational Therapy Goals        Problem: Occupational Therapy Goal    Goal Priority Disciplines Outcome Interventions   Occupational Therapy Goal     OT, PT/OT Ongoing, Progressing    Description: Goals to be met by: 12/8/2020     Patient will increase functional independence with ADLs by performing:    LE Dressing with Minimal Assistance and Assistive Devices as needed.  Grooming while standing at sink with Stand-by Assistance.  Toileting from bedside commode with Stand-by Assistance for hygiene and clothing management. - MET 12/2/2020   Supine to sit with Modified [place].  Toilet transfer to bedside commode with Stand-by Assistance.- MET 12/2/2020                      Time Tracking:     OT Date of Treatment: 12/02/20  OT Start Time: 1023  OT Stop Time:  1044  OT Total Time (min): 21 min    Billable Minutes:Self Care/Home Management 21    Elsy Pimentel OT  12/2/2020

## 2020-12-02 NOTE — PLAN OF CARE
Problem: Physical Therapy Goal  Goal: Physical Therapy Goal  Description: Goals to be met by: 2020    Patient will increase functional independence with mobility by performin. Supine to sit with supervision.   2. Sit to supine with supervision.   3. Sit<>stand transfer with supervision using rolling walker. GOAL MET 2020  4. Gait > 150 feet with SBA using rolling walker.   5. Verbalize UNIQEU precautions without verbal cues.      2020 1126 by KELLI Montero  Outcome: Ongoing, Progressing  2020 1035 by KELLI Montero  Outcome: Ongoing, Progressing    Pt tolerated treatment well with no adverse reactions. Pt performed transfers and ambulation x 120 ft with rolling walker and SBA. Demonstrated improved performance of sit <> stand. Pt continues to be limited by pain. Pt performed HEP with verbal and tactile cues for exercises/technique. Educated patient on UNIQUE precautions, safe use of rolling walker, home exercise program, importance of mobility, and PT plan of care. Pt verbalized understanding. Pt will benefit from skilled PT services to address impairments and functional limitations.

## 2020-12-02 NOTE — PLAN OF CARE
Problem: Physical Therapy Goal  Goal: Physical Therapy Goal  Description: Goals to be met by: 2020    Patient will increase functional independence with mobility by performin. Supine to sit with supervision.   2. Sit to supine with supervision.   3. Sit<>stand transfer with supervision using rolling walker.   4. Gait > 150 feet with SBA using rolling walker.   5. Verbalize UNIQUE precautions without verbal cues.    Outcome: Ongoing, Progressing    Pt tolerated treatment well with no adverse reactions. Pt performed transfers and ambulation x 110 ft with rolling walker and SBA. Demonstrated improved performance of exercises. Pt continues to be limited by pain. Pt performed HEP with verbal and tactile cues for exercises/technique. Educated patient on UNIQUE precautions, safe use of rolling walker, home exercise program, importance of mobility, and PT plan of care. Pt verbalized understanding. Pt will benefit from skilled PT services to address impairments and functional limitations.

## 2020-12-02 NOTE — PLAN OF CARE
Patient on RA with sats as documented.  Pt doing IS @ 1000  L. Will cont to encourage deep breathing and coughing. No apparent respiratory distress noted and will cont to monitor    Patient on documented CPAP settings QHS, with alarms set and functioning.

## 2020-12-02 NOTE — PLAN OF CARE
Pt accepted by [facility]     12/02/20 1039   Final Note   Assessment Type Final Discharge Note   Anticipated Discharge Disposition Home-Health   What phone number can be called within the next 1-3 days to see how you are doing after discharge?   ([phone])   Hospital Follow Up  Appt(s) scheduled? No

## 2020-12-02 NOTE — PT/OT/SLP PROGRESS
Physical Therapy Treatment    Patient Name:  Jayne You   MRN:  [id number]    Recommendations:     Discharge Recommendations:  nursing facility, skilled   Discharge Equipment Recommendations: none   Barriers to discharge: Current functional status    Assessment:     Jayne You is a 80 y.o. female admitted with a medical diagnosis of Hip dislocation, left.  She presents with the following impairments/functional limitations:  weakness, impaired endurance, impaired self care skills, impaired functional mobilty, gait instability, impaired balance, decreased lower extremity function, decreased safety awareness, pain, decreased ROM, edema, orthopedic precautions.    Pt tolerated treatment well with no adverse reactions. Pt performed transfers and ambulation x 110 ft with rolling walker and SBA. Demonstrated improved performance of exercises. Pt continues to be limited by pain. Pt performed HEP with verbal and tactile cues for exercises/technique. Educated patient on UNIQUE precautions, safe use of rolling walker, home exercise program, importance of mobility, and PT plan of care. Pt verbalized understanding. Pt will benefit from skilled PT services to address impairments and functional limitations.     Rehab Prognosis: Good; patient would benefit from acute skilled PT services to address these deficits and reach maximum level of function.    Recent Surgery: Procedure(s) (LRB):  REVISION, TOTAL ARTHROPLASTY, HIP (Left) 2 Days Post-Op    Plan:     During this hospitalization, patient to be seen (QD-BID) to address the identified rehab impairments via gait training, therapeutic activities, therapeutic exercises and progress toward the following goals:    · Plan of Care Expires:  12/08/20    Subjective     Chief Complaint: Pain in left hip.   Patient/Family Comments/goals: No goals stated.   Pain/Comfort:  · Pain Rating 1: 7/10  · Location - Side 1: Left  · Location - Orientation 1: generalized  · Location 1: hip  · Pain  Addressed 1: Reposition, Distraction, Cessation of Activity  · Pain Rating Post-Intervention 1: 7/10      Objective:     Communicated with RN (Cheryl) prior to session.  Patient found up in chair with peripheral IV, telemetry upon PT entry to room.     General Precautions: Standard, fall   Orthopedic Precautions:LLE weight bearing as tolerated, LLE anterior precautions   Braces: N/A     Functional Mobility: Gait belt and non-slip socks donned prior to mobility. Surgical mask donned for ambulation in hallway per current infection control policy.  · Transfers:     · Sit to Stand:  SBA with rolling walker  · Gait: Pt ambulated 110ft w/ RW and SBA.   · Pt with some grimacing during ambulation due to pain.   · Pt demonstrated improvement with safe use of RW as compared to last session, but still required minimal verbal cues initially.   · Pt demonstrated decreased rani, decreased endurance, decreased bilateral step length, decreased bilateral heel strike/toe off, and decresaed surgical knee ROM.      AM-PAC 6 CLICK MOBILITY  Turning over in bed (including adjusting bedclothes, sheets and blankets)?: 3  Sitting down on and standing up from a chair with arms (e.g., wheelchair, bedside commode, etc.): 4  Moving from lying on back to sitting on the side of the bed?: 3  Moving to and from a bed to a chair (including a wheelchair)?: 3  Need to walk in hospital room?: 3  Climbing 3-5 steps with a railing?: 3  Basic Mobility Total Score: 19       Therapeutic Activities and Exercises:  Patient performed reclined ther ex including LAQ x10, ankle pumps x10, quad sets x10, and glute sets x10.     PT educated patient re:   · PT plan of care/role of PT  · Use of rolling walker  · Importance of mobility  · Fall and safety precautions  · Gait deviations  · Use of call light (don't get up without assistance)  Pt verbalized understanding     The patient is safe to transfer with RN assist.  Patient encouraged to sit up in chair  throughout the day to prevent deconditioning.       Patient left up in chair with all lines intact and call button in reach..    GOALS:   Multidisciplinary Problems     Physical Therapy Goals        Problem: Physical Therapy Goal    Goal Priority Disciplines Outcome Goal Variances Interventions   Physical Therapy Goal     PT, PT/OT Ongoing, Progressing     Description: Goals to be met by: 2020    Patient will increase functional independence with mobility by performin. Supine to sit with supervision.   2. Sit to supine with supervision.   3. Sit<>stand transfer with supervision using rolling walker.   4. Gait > 150 feet with SBA using rolling walker.   5. Verbalize UNIQUE precautions without verbal cues.                           Time Tracking:     PT Received On: 20  PT Start Time: 0854     PT Stop Time: 908  PT Total Time (min): 14 min     Billable Minutes: Gait Training 14    Treatment Type: Treatment  PT/PTA: PT     PTA Visit Number: 0     KELLI Montero  2020

## 2020-12-03 VITALS
SYSTOLIC BLOOD PRESSURE: 127 MMHG | HEIGHT: 64 IN | RESPIRATION RATE: 20 BRPM | WEIGHT: 251.31 LBS | BODY MASS INDEX: 42.9 KG/M2 | DIASTOLIC BLOOD PRESSURE: 61 MMHG | TEMPERATURE: 99 F | HEART RATE: 101 BPM | OXYGEN SATURATION: 96 %

## 2020-12-03 PROBLEM — S73.005A HIP DISLOCATION, LEFT: Status: RESOLVED | Noted: 2020-11-30 | Resolved: 2020-12-03

## 2020-12-03 LAB
POCT GLUCOSE: 189 MG/DL (ref 70–110)
POCT GLUCOSE: 202 MG/DL (ref 70–110)

## 2020-12-03 PROCEDURE — 25000003 PHARM REV CODE 250: Performed by: ORTHOPAEDIC SURGERY

## 2020-12-03 PROCEDURE — 97116 GAIT TRAINING THERAPY: CPT

## 2020-12-03 PROCEDURE — 97530 THERAPEUTIC ACTIVITIES: CPT

## 2020-12-03 PROCEDURE — 25000003 PHARM REV CODE 250: Performed by: NURSE PRACTITIONER

## 2020-12-03 PROCEDURE — 99900035 HC TECH TIME PER 15 MIN (STAT)

## 2020-12-03 RX ORDER — LISINOPRIL 20 MG/1
20 TABLET ORAL DAILY
Status: DISCONTINUED | OUTPATIENT
Start: 2020-12-03 | End: 2020-12-03 | Stop reason: HOSPADM

## 2020-12-03 RX ORDER — HYDROCODONE BITARTRATE AND ACETAMINOPHEN 10; 325 MG/1; MG/1
1 TABLET ORAL EVERY 6 HOURS PRN
Qty: 50 TABLET | Refills: 0 | Status: ON HOLD | OUTPATIENT
Start: 2020-12-03 | End: 2021-06-16

## 2020-12-03 RX ADMIN — OXYCODONE HYDROCHLORIDE AND ACETAMINOPHEN 1 TABLET: 10; 325 TABLET ORAL at 03:12

## 2020-12-03 RX ADMIN — FAMOTIDINE 20 MG: 20 TABLET ORAL at 08:12

## 2020-12-03 RX ADMIN — LISINOPRIL 20 MG: 20 TABLET ORAL at 10:12

## 2020-12-03 RX ADMIN — GLIPIZIDE 2.5 MG: 2.5 TABLET, FILM COATED, EXTENDED RELEASE ORAL at 08:12

## 2020-12-03 RX ADMIN — POLYETHYLENE GLYCOL 3350 17 G: 17 POWDER, FOR SOLUTION ORAL at 08:12

## 2020-12-03 RX ADMIN — DULOXETINE HYDROCHLORIDE 30 MG: 30 CAPSULE, DELAYED RELEASE ORAL at 08:12

## 2020-12-03 RX ADMIN — OXYCODONE HYDROCHLORIDE AND ACETAMINOPHEN 1 TABLET: 10; 325 TABLET ORAL at 08:12

## 2020-12-03 RX ADMIN — ASPIRIN 81 MG: 81 TABLET, CHEWABLE ORAL at 08:12

## 2020-12-03 NOTE — PLAN OF CARE
Problem: Physical Therapy Goal  Goal: Physical Therapy Goal  Description: Goals to be met by: 2020    Patient will increase functional independence with mobility by performin. Supine to sit with supervision.   2. Sit to supine with supervision.   3. Sit<>stand transfer with supervision using rolling walker. GOAL MET 2020  4. Gait > 150 feet with SBA using rolling walker. GOAL MET 2020  5. Verbalize UNIQUE precautions without verbal cues.    Outcome: Ongoing, Progressing    Pt tolerated treatment well with no adverse reactions. Pt performed transfers and ambulation x 150 ft with rolling walker and SBA. Demonstrated improved ambulation. Pt continues to be limited by pain. Pt performed HEP with verbal and tactile cues for exercises/technique. Educated patient on UNIQUE precautions, safe use of rolling walker, home exercise program, importance of mobility, and PT plan of care. Pt verbalized understanding. Pt will benefit from skilled PT services to address impairments and functional limitations.

## 2020-12-03 NOTE — PROGRESS NOTES
"MED  Progress Note    Admit Date: 11/30/2020   LOS: 3 days     SUBJECTIVE:     Follow-up For:  Hip dislocation, left    Interval History:     Uneventful night.   Doing better.  No CP/SOB/Calf pain.  BP much improved.     Review of Systems:  Constitutional: No fever or chills  Respiratory: No cough or shortness of breath  Cardiovascular: No chest pain or palpitations  Gastrointestinal: No nausea or vomiting  Neurological: No confusion or weakness    OBJECTIVE:     Vital Signs Range (Last 24H):  BP (!) 163/75 (BP Location: Left arm, Patient Position: Sitting)   Pulse 93   Temp 97.5 °F (36.4 °C) (Oral)   Resp 20   Ht 5' 4" (1.626 m)   Wt 114 kg (251 lb 5.2 oz)   SpO2 (!) 89%   BMI 43.14 kg/m²     Temp:  [97.5 °F (36.4 °C)-98.8 °F (37.1 °C)]   Pulse:  []   Resp:  [16-20]   BP: (113-163)/(52-75)   SpO2:  [89 %-97 %]     I & O (Last 24H):    Intake/Output Summary (Last 24 hours) at 12/3/2020 0846  Last data filed at 12/3/2020 0834  Gross per 24 hour   Intake 1360 ml   Output 102 ml   Net 1258 ml       Physical Exam:  General appearance: Well developed, well nourished  Eyes:  Conjunctivae/corneas clear. PERRL.  Lungs: Normal respiratory effort,   clear to auscultation bilaterally but slightly diminished.    Heart: Regular rate and rhythm, S1, S2 normal, no murmur, rub or anna.  Abdomen: Soft, non-tender non-distended; bowel sounds normal; no masses,  no organomegaly, obese  Extremities: No cyanosis or clubbing. No edema.    Skin: Skin color, texture, turgor normal. No rashes or lesions  Neurologic: Normal strength and tone. No focal numbness or weakness   Left hip clear dry and intact    Laboratory Data:  No results for input(s): WBC, RBC, HGB, HCT, PLT, MCV, MCH, MCHC in the last 24 hours.    BMP:   Recent Labs   Lab 12/02/20  0409   *   *   K 3.9   CL 97   CO2 29   BUN 23   CREATININE 1.0   CALCIUM 8.4*     Lab Results   Component Value Date    CALCIUM 8.4 (L) 12/02/2020       Lab Results "   Component Value Date    CALCIUM 8.4 (L) 12/02/2020     POCT Glucose   Date Value Ref Range Status   12/03/2020 189 (H) 70 - 110 mg/dL Final   12/02/2020 187 (H) 70 - 110 mg/dL Final   12/02/2020 180 (H) 70 - 110 mg/dL Final   12/01/2020 172 (H) 70 - 110 mg/dL Final   12/01/2020 158 (H) 70 - 110 mg/dL Final   11/30/2020 217 (H) 70 - 110 mg/dL Final     No results found for: URICACID    BNP  No results for input(s): BNP, BNPTRIAGEBLO in the last 168 hours.    Medications:  Medication list was reviewed and changes noted under Assessment/Plan.    Diagnostic Results:        ASSESSMENT/PLAN:        1. S/ P Left hip Revision (M16.9):  Per ortho/therapy teams.  2. HTN (I10):  labile during hospitalization and now improved with holding agents and IVF/salt.  Resume meds as indicated.    3. DM (E11.65):  Oral meds reordered/ADA/SSI.  Recommend GLP by PCP for weight loss.  4. Obese:  As above  5. KHAI (G47.33):  CPAP ordered.  6. Cardiomyopathy:  Placed on tele without need events noted  7. DVT :  ASA BID per ortho         Medically stable  Ok to DC

## 2020-12-03 NOTE — PLAN OF CARE
No significant events overnight. Remains free from fall, injury, and skin breakdown. Voiding without difficulty via BSC. VSS on RA. Pain well controlled with PRN percocet 10. Neuro checks WNL. L hip dressing intact, scant dried drainage; marked. SCDs in place. Abduction pillow on. Plan of care reviewed with patient and all questions answered. Bed low, locked w/ bed alarm on. Call light within reach. Purposeful rounding performed. Resting comfortably in bed, no other complaints at this time.

## 2020-12-03 NOTE — PT/OT/SLP PROGRESS
Physical Therapy Treatment    Patient Name:  Jayne You   MRN:  [id number]    Recommendations:     Discharge Recommendations:  nursing facility, skilled   Discharge Equipment Recommendations: none   Barriers to discharge: None    Assessment:     Jayne You is a 80 y.o. female admitted with a medical diagnosis of Hip dislocation, left.  She presents with the following impairments/functional limitations:  weakness, impaired endurance, impaired self care skills, impaired functional mobilty, gait instability, impaired balance, decreased lower extremity function, pain, decreased ROM, edema, orthopedic precautions.    Pt tolerated treatment well with no adverse reactions. Pt performed transfers and ambulation x 150 ft with rolling walker and SBA. Demonstrated improved ambulation. Pt continues to be limited by pain. Pt performed HEP with verbal and tactile cues for exercises/technique. Educated patient on UNIQUE precautions, safe use of rolling walker, home exercise program, importance of mobility, and PT plan of care. Pt verbalized understanding. Pt will benefit from skilled PT services to address impairments and functional limitations.     Rehab Prognosis: Good; patient would benefit from acute skilled PT services to address these deficits and reach maximum level of function.    Recent Surgery: Procedure(s) (LRB):  REVISION, TOTAL ARTHROPLASTY, HIP (Left) 3 Days Post-Op    Plan:     During this hospitalization, patient to be seen (QD-BID) to address the identified rehab impairments via gait training, therapeutic activities, therapeutic exercises and progress toward the following goals:    · Plan of Care Expires:  12/08/20    Subjective     Chief Complaint: Pt with no complaints of pain today, but she does report feeling nauseous.   Patient/Family Comments/goals: Pt eager to return home.  Pain/Comfort:  · Pain Rating 1: 0/10      Objective:     Communicated with RN prior to session.  Patient found supine with  telemetry, peripheral IV upon PT entry to room.     General Precautions: Standard, fall   Orthopedic Precautions:LLE weight bearing as tolerated, LLE anterior precautions   Braces: N/A     Functional Mobility: Gait belt and non-slip socks donned prior to mobility. Surgical mask donned for ambulation in hallway per current infection control policy.  · Bed Mobility:     · Supine to Sit: moderate assistance  · Transfers:     · Sit to Stand:  supervision with rolling walker  · Gait: Pt ambulated 150ft w/ RW and SBA.   · Pt demonstrated improvement with ambulation and use of RW.   · Pt with step through gait pattern.   · Pt demonstrated decreased rani, decreased surgical knee ROM, and decreased bilateral heel strike/toe off.      AM-PAC 6 CLICK MOBILITY  Turning over in bed (including adjusting bedclothes, sheets and blankets)?: 3  Sitting down on and standing up from a chair with arms (e.g., wheelchair, bedside commode, etc.): 4  Moving from lying on back to sitting on the side of the bed?: 3  Moving to and from a bed to a chair (including a wheelchair)?: 3  Need to walk in hospital room?: 3  Climbing 3-5 steps with a railing?: 3  Basic Mobility Total Score: 19       Therapeutic Activities and Exercises:   Patient performed supine ther ex including ankle pumps x10, quad sets x10, and glute sets x10. Upon beginning of session, patient able to verbalize 3 UNIQUE precautions. Pt provided with visual instruction and demonstrations for remaining precautions. At end of session, pt only able to verbalize 1 UNIQUE precaution. Patient again reminded of UNIQUE precautions. Handout placed in patient's personal bag.    Patient left sitting edge of bed with all lines intact and call button in reach..    GOALS:   Multidisciplinary Problems     Physical Therapy Goals        Problem: Physical Therapy Goal    Goal Priority Disciplines Outcome Goal Variances Interventions   Physical Therapy Goal     PT, PT/OT Ongoing, Progressing      Description: Goals to be met by: 2020    Patient will increase functional independence with mobility by performin. Supine to sit with supervision.   2. Sit to supine with supervision.   3. Sit<>stand transfer with supervision using rolling walker. GOAL MET 2020  4. Gait > 150 feet with SBA using rolling walker. GOAL MET 2020  5. Verbalize UNIQUE precautions without verbal cues.                           Time Tracking:     PT Received On: 20  PT Start Time: 915     PT Stop Time: 930  PT Total Time (min): 15 min     Billable Minutes: Gait Training 15    Treatment Type: Treatment  PT/PTA: PT     PTA Visit Number: 0     KELLI Montero  2020

## 2020-12-03 NOTE — PLAN OF CARE
Patient in no apparent distress. Sat's  97 % on room air. IS done. Patient placed on CPAP with settings as documented for night.  At 0340, patient found off CPAP and no longer wanted to wear. Will continue to monitor.

## 2020-12-03 NOTE — PT/OT/SLP PROGRESS
Occupational Therapy   Treatment    Name: Jayne You  MRN: [id number]  Admitting Diagnosis:  Hip dislocation, left  3 Days Post-Op    Recommendations:     Discharge Recommendations: home health PT, home health OT  Discharge Equipment Recommendations:  none  Barriers to discharge:  Decreased caregiver support    Assessment:     Jayne You is a 80 y.o. female with a medical diagnosis of Hip dislocation, left.  She presents seated EOB awaiting transport for d/c home. Performance deficits affecting function are weakness, impaired endurance, impaired self care skills, impaired functional mobilty, gait instability, decreased upper extremity function, decreased lower extremity function, orthopedic precautions.     Rehab Prognosis:  Good; patient would benefit from acute skilled OT services to address these deficits and reach maximum level of function.       Plan:     Patient to be seen 6 x/week to address the above listed problems via self-care/home management, therapeutic activities, therapeutic exercises  · Plan of Care Expires: 12/31/20  · Plan of Care Reviewed with: patient    Subjective     Pain/Comfort:  · Pain Rating 1: 0/10  · Pain Rating Post-Intervention 1: 0/10    Objective:     Communicated with: BRITNEY Moses prior to session.  Patient found seated EOB upon OT entry to room.    General Precautions: Standard, fall   Orthopedic Precautions:LLE weight bearing as tolerated, LLE anterior precautions   Braces: N/A     Occupational Performance:     Bed Mobility:    · DNT    Functional Mobility/Transfers:  · Patient completed Sit <> Stand Transfer with supervision  with  rolling walker   · Patient completed Toilet Transfer Step Transfer technique with modified independence with  rolling walker per pt report, declined to demonstrate  · Functional Mobility: supervision RW side steps     Activities of Daily Living:  · Grooming: modified independence seated per pt report, declined to demonstrated  · Upper Body Dressing:  setup don pullover dress per pt report, declined to demonstrate  · Lower Body Dressing: total assistance don socks EOB; pt reports using hip kit at home, not interested in demonstration of hip kit states she knows how it works   · Toileting: modified independence from BSC per pt report      [facility] 6 Click ADL: 17    Treatment & Education:  Pt participated in OT tx session with ADLS & functional mobility performed as documented above.  Assisted pt in packing belongings for D/c.     Education provided on role of OT including safe performance of self-care tasks, mobility techniques, safe use of DME, and encouragement of mobilization during hospitalization to prevent/limit deconditioning as well as discharge recommendations     Patient left seated EOB with all lines intact, call button in reach and RN notifiedEducation:      GOALS:   Multidisciplinary Problems     Occupational Therapy Goals        Problem: Occupational Therapy Goal    Goal Priority Disciplines Outcome Interventions   Occupational Therapy Goal     OT, PT/OT Ongoing, Progressing    Description: Goals to be met by: 12/8/2020     Patient will increase functional independence with ADLs by performing:    LE Dressing with Minimal Assistance and Assistive Devices as needed.  Grooming while standing at sink with Stand-by Assistance.  Toileting from bedside commode with Stand-by Assistance for hygiene and clothing management. - MET 12/2/2020   Supine to sit with Modified [place].  Toilet transfer to bedside commode with Stand-by Assistance.- MET 12/2/2020                      Time Tracking:     OT Date of Treatment: 12/03/20  OT Start Time: 1344  OT Stop Time: 1355  OT Total Time (min): 11 min    Billable Minutes:Therapeutic Activity 11    Elsy Pimentel OT  12/3/2020

## 2020-12-03 NOTE — PLAN OF CARE
Problem: Occupational Therapy Goal  Goal: Occupational Therapy Goal  Description: Goals to be met by: 12/8/2020     Patient will increase functional independence with ADLs by performing:    LE Dressing with Minimal Assistance and Assistive Devices as needed.  Grooming while standing at sink with Stand-by Assistance.  Toileting from bedside commode with Stand-by Assistance for hygiene and clothing management. - MET 12/2/2020   Supine to sit with Modified [place].  Toilet transfer to bedside commode with Stand-by Assistance.- MET 12/2/2020     Outcome: Ongoing, Progressing   Pt making steady progress towards goals, POC remains appropriate.  Pt awaiting transport for d/c home, reports has been taking care of self in room today, including ambulation, toileting, grooming at sink. Recommend HH PT/OT & family assist as needed.

## 2020-12-03 NOTE — PT/OT/SLP PROGRESS
Physical Therapy      Patient Name:  Jayne You   MRN:  [id number]    Patient not seen today secondary to pt sitting up EOB, dressed and waiting on transport for discharge, refused amb.    Justa Rowell, PTA

## 2020-12-03 NOTE — DISCHARGE SUMMARY
[facility]  Discharge Summary      Admit Date: 11/30/2020    Discharge Date and Time: No discharge date for patient encounter.    Attending Physician: Isaiah Mccormick MD     Reason for Admission:  Left hip dislocation    Procedures Performed: Procedure(s) (LRB):  REVISION, TOTAL ARTHROPLASTY, HIP (Left)    Hospital Course (synopsis of major diagnoses, care, treatment, and services provided during the course of the hospital stay):  Low blood pressure had keep her longer     Consults: nephrology    Significant Diagnostic Studies: Labs: All labs within the past 24 hours have been reviewed    Final Diagnoses:    Principal Problem: Hip dislocation, left   Secondary Diagnoses:   Active Hospital Problems   No active problems to display.      Resolved Hospital Problems    Diagnosis Date Resolved POA    *Hip dislocation, left [S73.005A] 12/03/2020 Yes    Hip dislocation, left [S73.005A] 11/30/2020 Yes       Discharged Condition: good    [place] [facility]/Patient Instructions:     Medications:  Reconciled Home Medications:      Medication List      CONTINUE taking these medications    amLODIPine 5 MG tablet  Commonly known as: NORVASC  Take 10 mg by mouth once daily.     aspirin 81 MG Chew  Take 1 tablet (81 mg total) by mouth 2 (two) times daily.     baclofen 10 MG tablet  Commonly known as: LIORESAL  Take 10 mg by mouth 2 (two) times daily.     DULoxetine 30 MG capsule  Commonly known as: CYMBALTA  Take 30 mg by mouth once daily.     ergocalciferol 50,000 unit Cap  Commonly known as: ERGOCALCIFEROL  Take 50,000 Units by mouth every 7 days. Mondays     furosemide 20 MG tablet  Commonly known as: LASIX  Take 20 mg by mouth as needed. Takes when feet swell     glipiZIDE 5 MG Tr24  Commonly known as: GLUCOTROL  Take 5 mg by mouth daily with breakfast.     HYDROcodone-acetaminophen  mg per tablet  Commonly known as: NORCO  Take 1 tablet by mouth every 6 (six) hours as  needed.     lisinopriL 40 MG tablet  Commonly known as: PRINIVIL,ZESTRIL  Take 40 mg by mouth once daily.     zolpidem 10 mg Tab  Commonly known as: AMBIEN  Take 5 mg by mouth nightly as needed.          No discharge procedures on file.  Follow-up Information     [facility].    Contact information:  [address] [facility] [id number] [phone]             [facility] [place].    Specialties: Home Health Services, Physical Therapy, Occupational Therapy  Why: Home Health  Contact information:  [address]  [facility] [id number] [phone]             Please follow up.    Why: AS SCHEDULED , Call [phone] to reach Dr. Mccormick

## 2020-12-03 NOTE — PROGRESS NOTES
Blood pressure tolerating stable.  Instructions given by the medical service.  Okay to discharge home with home health.  Instructions given.  Follow-up 2 weeks postop.

## 2020-12-04 ENCOUNTER — PATIENT OUTREACH (OUTPATIENT)
Dept: ADMINISTRATIVE | Facility: CLINIC | Age: 80
End: 2020-12-04

## 2020-12-04 LAB — BACTERIA SPEC AEROBE CULT: NO GROWTH

## 2020-12-04 NOTE — PATIENT INSTRUCTIONS
Total Hip Replacement  Total hip replacement surgery almost always reduces joint pain. During this surgery, your problem hip joint is replaced with an artificial joint, called a prosthesis.  Benefits of hip replacement  Total hip replacement surgery almost always:  · Stops or greatly reduces hip pain. Even the pain from surgery should go away within weeks.  · Increases leg function. Without hip pain, youll be able to use your legs more. This will build up your muscles.  · Improves quality of life by allowing you to do daily tasks and low-impact activities in greater comfort.  · Provides years of easier movement. Most total hip replacements last for many years.  Your surgical experience  You will most likely arrive at the hospital on the morning of surgery. In many cases, pre-op tests are done days or even weeks ahead of time. Follow all of your surgeons instructions on preparing for surgery. When you arrive, youll be given forms to fill out. You will also talk with the anesthesiologist,  the doctor who gives the anesthesia, if you havent done so already.  Preparing for surgery  Follow any directions you are given for taking medicines or for not eating or drinking before surgery. You may need to stop certain medicines several days or weeks before the surgery. At the hospital your temperature, pulse, breathing, and blood pressure will be checked. An IV (intravenous) line will be started to provide fluids and medicines needed during surgery.    The surgical procedure  When the surgical team is ready, youll be taken to the operating room. There youll be given anesthesia. The anesthesia will help you sleep through surgery, or it will make you numb from the waist down. Then an incision is made, giving the surgeon access to your hip joint. The damaged ball is removed, and the socket is prepared to hold the prosthesis. After the new joint is in place, the incision is closed with staples or stitches.  Preparing the  bone  The hip is a ball-and-socket joint. The ball is cut from the thighbone, and the surface of the old socket is smoothed. Then the new socket is put into the pelvis. The socket is usually press-fit and may be held in place with screws. A press-fit prosthesis has tiny pores on its surface that your bone will grow into. Cement or press-fit may be used to hold the ball-and-stem portion of the hip replacement.    Joining the new parts  The new hip stem is inserted into the upper portion of your thighbone. After the stem is secure in the thighbone, the new ball and socket are joined. The stem of the prosthesis may be held with cement or press-fit. Your surgeon will choose the method that is best for you.  In the recovery room  After surgery youll be sent to the recovery room, also called the PACU (postanesthesia care unit). Your condition will be watched closely, and youll be given pain medications. You may have a catheter (small tube) in your bladder and a drain in your hip. To keep your new joint stable, a foam wedge or pillows may be placed between your legs. In some cases, a brace is used.  Risks and complications  As with any surgery, hip replacement has possible risks and complications. These include the following:  · Reaction to the anesthesia  · Blood clots  · Infection  · Dislocation of the joint or loosening of the prosthesis  · Fracture  · Wearing out the prosthetic  · Damage to nearby blood vessels, bones, or nerves  · Thigh pain     © 9705-5248 [employer]. [address] [place] [address], [place] [address]. All rights reserved. This information is not intended as a substitute for professional medical care. Always follow your healthcare professional's instructions.

## 2020-12-07 LAB — BACTERIA SPEC ANAEROBE CULT: NORMAL

## 2020-12-14 ENCOUNTER — TELEPHONE (OUTPATIENT)
Dept: ADMINISTRATIVE | Facility: CLINIC | Age: 80
End: 2020-12-14

## 2021-04-21 ENCOUNTER — CLINICAL SUPPORT (OUTPATIENT)
Dept: REHABILITATION | Facility: HOSPITAL | Age: 81
End: 2021-04-21
Payer: MEDICARE

## 2021-04-21 DIAGNOSIS — R26.2 DIFFICULTY IN WALKING: ICD-10-CM

## 2021-04-21 DIAGNOSIS — M62.81 MUSCLE WEAKNESS (GENERALIZED): ICD-10-CM

## 2021-04-21 DIAGNOSIS — M54.50 LUMBAR PAIN: ICD-10-CM

## 2021-04-21 PROCEDURE — 97162 PT EVAL MOD COMPLEX 30 MIN: CPT | Mod: PO

## 2021-04-21 PROCEDURE — 97110 THERAPEUTIC EXERCISES: CPT | Mod: PO

## 2021-04-22 PROBLEM — M62.81 MUSCLE WEAKNESS (GENERALIZED): Status: ACTIVE | Noted: 2021-04-22

## 2021-04-22 PROBLEM — R26.2 DIFFICULTY IN WALKING: Status: ACTIVE | Noted: 2021-04-22

## 2021-04-28 ENCOUNTER — CLINICAL SUPPORT (OUTPATIENT)
Dept: REHABILITATION | Facility: HOSPITAL | Age: 81
End: 2021-04-28
Payer: MEDICARE

## 2021-04-28 DIAGNOSIS — M62.81 MUSCLE WEAKNESS (GENERALIZED): ICD-10-CM

## 2021-04-28 PROCEDURE — 97110 THERAPEUTIC EXERCISES: CPT | Mod: PO

## 2021-04-30 ENCOUNTER — CLINICAL SUPPORT (OUTPATIENT)
Dept: REHABILITATION | Facility: HOSPITAL | Age: 81
End: 2021-04-30
Payer: MEDICARE

## 2021-04-30 DIAGNOSIS — M62.81 MUSCLE WEAKNESS (GENERALIZED): ICD-10-CM

## 2021-04-30 PROCEDURE — 97110 THERAPEUTIC EXERCISES: CPT | Mod: PO

## 2021-05-05 ENCOUNTER — CLINICAL SUPPORT (OUTPATIENT)
Dept: REHABILITATION | Facility: HOSPITAL | Age: 81
End: 2021-05-05
Payer: MEDICARE

## 2021-05-05 DIAGNOSIS — M62.81 MUSCLE WEAKNESS (GENERALIZED): ICD-10-CM

## 2021-05-05 PROBLEM — M79.605 PAIN OF LEFT LOWER EXTREMITY: Status: RESOLVED | Noted: 2020-06-03 | Resolved: 2021-05-05

## 2021-05-05 PROBLEM — R29.898 WEAKNESS OF LEFT LOWER EXTREMITY: Status: RESOLVED | Noted: 2020-06-03 | Resolved: 2021-05-05

## 2021-05-05 PROCEDURE — 97110 THERAPEUTIC EXERCISES: CPT | Mod: PO

## 2021-05-07 ENCOUNTER — CLINICAL SUPPORT (OUTPATIENT)
Dept: REHABILITATION | Facility: HOSPITAL | Age: 81
End: 2021-05-07
Payer: MEDICARE

## 2021-05-07 DIAGNOSIS — M62.81 MUSCLE WEAKNESS (GENERALIZED): ICD-10-CM

## 2021-05-07 PROCEDURE — 97110 THERAPEUTIC EXERCISES: CPT | Mod: PO

## 2021-05-14 ENCOUNTER — CLINICAL SUPPORT (OUTPATIENT)
Dept: REHABILITATION | Facility: HOSPITAL | Age: 81
End: 2021-05-14
Payer: MEDICARE

## 2021-05-14 DIAGNOSIS — M62.81 MUSCLE WEAKNESS (GENERALIZED): ICD-10-CM

## 2021-05-14 PROCEDURE — 97110 THERAPEUTIC EXERCISES: CPT | Mod: PO

## 2021-05-21 ENCOUNTER — CLINICAL SUPPORT (OUTPATIENT)
Dept: REHABILITATION | Facility: HOSPITAL | Age: 81
End: 2021-05-21
Payer: MEDICARE

## 2021-05-21 DIAGNOSIS — M62.81 MUSCLE WEAKNESS (GENERALIZED): ICD-10-CM

## 2021-05-21 PROCEDURE — 97110 THERAPEUTIC EXERCISES: CPT | Mod: PO

## 2021-05-26 ENCOUNTER — CLINICAL SUPPORT (OUTPATIENT)
Dept: REHABILITATION | Facility: HOSPITAL | Age: 81
End: 2021-05-26
Payer: MEDICARE

## 2021-05-26 DIAGNOSIS — M62.81 MUSCLE WEAKNESS (GENERALIZED): ICD-10-CM

## 2021-05-26 PROCEDURE — 97110 THERAPEUTIC EXERCISES: CPT | Mod: PO

## 2021-05-28 ENCOUNTER — CLINICAL SUPPORT (OUTPATIENT)
Dept: REHABILITATION | Facility: HOSPITAL | Age: 81
End: 2021-05-28
Payer: MEDICARE

## 2021-05-28 DIAGNOSIS — R26.2 DIFFICULTY IN WALKING: ICD-10-CM

## 2021-05-28 DIAGNOSIS — M62.81 MUSCLE WEAKNESS (GENERALIZED): ICD-10-CM

## 2021-05-28 PROCEDURE — 97110 THERAPEUTIC EXERCISES: CPT | Mod: PO

## 2021-06-02 ENCOUNTER — CLINICAL SUPPORT (OUTPATIENT)
Dept: REHABILITATION | Facility: HOSPITAL | Age: 81
End: 2021-06-02
Payer: MEDICARE

## 2021-06-02 DIAGNOSIS — M62.81 MUSCLE WEAKNESS (GENERALIZED): ICD-10-CM

## 2021-06-02 PROCEDURE — 97110 THERAPEUTIC EXERCISES: CPT | Mod: PO

## 2021-06-04 ENCOUNTER — CLINICAL SUPPORT (OUTPATIENT)
Dept: REHABILITATION | Facility: HOSPITAL | Age: 81
End: 2021-06-04
Payer: MEDICARE

## 2021-06-04 ENCOUNTER — DOCUMENTATION ONLY (OUTPATIENT)
Dept: REHABILITATION | Facility: HOSPITAL | Age: 81
End: 2021-06-04

## 2021-06-04 DIAGNOSIS — M62.81 MUSCLE WEAKNESS (GENERALIZED): Primary | ICD-10-CM

## 2021-06-04 DIAGNOSIS — M62.81 MUSCLE WEAKNESS (GENERALIZED): ICD-10-CM

## 2021-06-04 PROCEDURE — 97164 PT RE-EVAL EST PLAN CARE: CPT | Mod: PO

## 2021-06-07 PROBLEM — M62.81 MUSCLE WEAKNESS (GENERALIZED): Status: RESOLVED | Noted: 2021-04-22 | Resolved: 2021-06-07

## 2021-06-15 ENCOUNTER — HOSPITAL ENCOUNTER (OUTPATIENT)
Facility: HOSPITAL | Age: 81
Discharge: HOME HEALTH | End: 2021-06-18
Attending: EMERGENCY MEDICINE | Admitting: HOSPITALIST
Payer: MEDICARE

## 2021-06-15 DIAGNOSIS — R40.20 LOC (LOSS OF CONSCIOUSNESS): ICD-10-CM

## 2021-06-15 DIAGNOSIS — I10 ESSENTIAL HYPERTENSION: ICD-10-CM

## 2021-06-15 DIAGNOSIS — R07.9 CHEST PAIN: ICD-10-CM

## 2021-06-15 DIAGNOSIS — R61 DIAPHORESIS: ICD-10-CM

## 2021-06-15 DIAGNOSIS — N17.9 AKI (ACUTE KIDNEY INJURY): ICD-10-CM
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
## 2021-06-15 DIAGNOSIS — E11.9 TYPE 2 DIABETES MELLITUS WITHOUT COMPLICATION, WITHOUT LONG-TERM CURRENT USE OF INSULIN: ICD-10-CM

## 2021-06-15 DIAGNOSIS — Z79.899 POLYPHARMACY: ICD-10-CM

## 2021-06-15 DIAGNOSIS — G93.40 ACUTE ENCEPHALOPATHY: ICD-10-CM

## 2021-06-15 DIAGNOSIS — G93.41 ENCEPHALOPATHY, METABOLIC: ICD-10-CM

## 2021-06-15 DIAGNOSIS — R41.82 ALTERED MENTAL STATUS, UNSPECIFIED ALTERED MENTAL STATUS TYPE: ICD-10-CM

## 2021-06-15 PROBLEM — G93.6 CYTOTOXIC CEREBRAL EDEMA: Status: ACTIVE | Noted: 2021-06-15

## 2021-06-15 PROBLEM — I63.311 THROMBOTIC STROKE INVOLVING RIGHT MIDDLE CEREBRAL ARTERY: Status: ACTIVE | Noted: 2021-06-15

## 2021-06-15 PROBLEM — G93.6 CYTOTOXIC CEREBRAL EDEMA: Status: RESOLVED | Noted: 2021-06-15 | Resolved: 2021-06-15

## 2021-06-15 PROBLEM — R53.83 LETHARGY: Status: ACTIVE | Noted: 2021-06-15

## 2021-06-15 LAB
ABO + RH BLD: NORMAL
ALBUMIN SERPL BCP-MCNC: 3.8 G/DL (ref 3.5–5.2)
ALLENS TEST: ABNORMAL
ALP SERPL-CCNC: 113 U/L (ref 55–135)
ALT SERPL W/O P-5'-P-CCNC: 11 U/L (ref 10–44)
AMPHET+METHAMPHET UR QL: NEGATIVE
ANION GAP SERPL CALC-SCNC: 14 MMOL/L (ref 8–16)
APTT BLDCRRT: 21.1 SEC (ref 21–32)
AST SERPL-CCNC: 16 U/L (ref 10–40)
BARBITURATES UR QL SCN>200 NG/ML: NEGATIVE
BASOPHILS # BLD AUTO: 0.05 K/UL (ref 0–0.2)
BASOPHILS NFR BLD: 0.8 % (ref 0–1.9)
BENZODIAZ UR QL SCN>200 NG/ML: NEGATIVE
BILIRUB SERPL-MCNC: 0.4 MG/DL (ref 0.1–1)
BLD GP AB SCN CELLS X3 SERPL QL: NORMAL
BNP SERPL-MCNC: 49 PG/ML (ref 0–99)
BUN SERPL-MCNC: 21 MG/DL (ref 8–23)
BUN SERPL-MCNC: 23 MG/DL (ref 6–30)
BZE UR QL SCN: NEGATIVE
CALCIUM SERPL-MCNC: 9.8 MG/DL (ref 8.7–10.5)
CANNABINOIDS UR QL SCN: NEGATIVE
CHLORIDE SERPL-SCNC: 103 MMOL/L (ref 95–110)
CHLORIDE SERPL-SCNC: 103 MMOL/L (ref 95–110)
CO2 SERPL-SCNC: 24 MMOL/L (ref 23–29)
CREAT SERPL-MCNC: 1.6 MG/DL (ref 0.5–1.4)
CREAT SERPL-MCNC: 1.6 MG/DL (ref 0.5–1.4)
CREAT UR-MCNC: 130 MG/DL (ref 15–325)
CTP QC/QA: YES
DIFFERENTIAL METHOD: ABNORMAL
EOSINOPHIL # BLD AUTO: 0.1 K/UL (ref 0–0.5)
EOSINOPHIL NFR BLD: 2.2 % (ref 0–8)
ERYTHROCYTE [DISTWIDTH] IN BLOOD BY AUTOMATED COUNT: 15.7 % (ref 11.5–14.5)
EST. GFR  (AFRICAN AMERICAN): 35.1 ML/MIN/1.73 M^2
EST. GFR  (NON AFRICAN AMERICAN): 30.4 ML/MIN/1.73 M^2
ETHANOL SERPL-MCNC: <10 MG/DL
GLUCOSE SERPL-MCNC: 138 MG/DL (ref 70–110)
GLUCOSE SERPL-MCNC: 143 MG/DL (ref 70–110)
HCO3 UR-SCNC: 33.4 MMOL/L (ref 24–28)
HCT VFR BLD AUTO: 47.7 % (ref 37–48.5)
HCT VFR BLD CALC: 45 %PCV (ref 36–54)
HGB BLD-MCNC: 14 G/DL (ref 12–16)
IMM GRANULOCYTES # BLD AUTO: 0.01 K/UL (ref 0–0.04)
IMM GRANULOCYTES NFR BLD AUTO: 0.2 % (ref 0–0.5)
INR PPP: 1 (ref 0.8–1.2)
LYMPHOCYTES # BLD AUTO: 2.3 K/UL (ref 1–4.8)
LYMPHOCYTES NFR BLD: 36 % (ref 18–48)
MCH RBC QN AUTO: 23.9 PG (ref 27–31)
MCHC RBC AUTO-ENTMCNC: 29.4 G/DL (ref 32–36)
MCV RBC AUTO: 82 FL (ref 82–98)
METHADONE UR QL SCN>300 NG/ML: NEGATIVE
MONOCYTES # BLD AUTO: 0.5 K/UL (ref 0.3–1)
MONOCYTES NFR BLD: 8.1 % (ref 4–15)
NEUTROPHILS # BLD AUTO: 3.3 K/UL (ref 1.8–7.7)
NEUTROPHILS NFR BLD: 52.7 % (ref 38–73)
NRBC BLD-RTO: 0 /100 WBC
OPIATES UR QL SCN: NEGATIVE
PCO2 BLDA: 62.8 MMHG (ref 35–45)
PCP UR QL SCN>25 NG/ML: NEGATIVE
PH SMN: 7.33 [PH] (ref 7.35–7.45)
PLATELET # BLD AUTO: 189 K/UL (ref 150–450)
PMV BLD AUTO: 11.9 FL (ref 9.2–12.9)
PO2 BLDA: 25 MMHG (ref 40–60)
POC BE: 7 MMOL/L
POC IONIZED CALCIUM: 1.14 MMOL/L (ref 1.06–1.42)
POC SATURATED O2: 40 % (ref 95–100)
POC TCO2 (MEASURED): 28 MMOL/L (ref 23–29)
POC TCO2: 35 MMOL/L (ref 24–29)
POCT GLUCOSE: 151 MG/DL (ref 70–110)
POTASSIUM BLD-SCNC: 3.9 MMOL/L (ref 3.5–5.1)
POTASSIUM SERPL-SCNC: 3.8 MMOL/L (ref 3.5–5.1)
PROT SERPL-MCNC: 7.5 G/DL (ref 6–8.4)
PROTHROMBIN TIME: 10.6 SEC (ref 9–12.5)
RBC # BLD AUTO: 5.85 M/UL (ref 4–5.4)
SAMPLE: ABNORMAL
SAMPLE: ABNORMAL
SARS-COV-2 RDRP RESP QL NAA+PROBE: NEGATIVE
SITE: ABNORMAL
SODIUM BLD-SCNC: 143 MMOL/L (ref 136–145)
SODIUM SERPL-SCNC: 141 MMOL/L (ref 136–145)
TOXICOLOGY INFORMATION: NORMAL
TROPONIN I SERPL DL<=0.01 NG/ML-MCNC: <0.006 NG/ML (ref 0–0.03)
WBC # BLD AUTO: 6.31 K/UL (ref 3.9–12.7)

## 2021-06-15 PROCEDURE — 99291 PR CRITICAL CARE, E/M 30-74 MINUTES: ICD-10-PCS | Mod: CS,,, | Performed by: EMERGENCY MEDICINE

## 2021-06-15 PROCEDURE — 93010 ELECTROCARDIOGRAM REPORT: CPT | Mod: ,,, | Performed by: INTERNAL MEDICINE

## 2021-06-15 PROCEDURE — 63600175 PHARM REV CODE 636 W HCPCS: Performed by: EMERGENCY MEDICINE

## 2021-06-15 PROCEDURE — 80047 BASIC METABLC PNL IONIZED CA: CPT

## 2021-06-15 PROCEDURE — 82077 ASSAY SPEC XCP UR&BREATH IA: CPT | Performed by: EMERGENCY MEDICINE

## 2021-06-15 PROCEDURE — 99291 CRITICAL CARE FIRST HOUR: CPT | Mod: CS,,, | Performed by: EMERGENCY MEDICINE

## 2021-06-15 PROCEDURE — 96360 HYDRATION IV INFUSION INIT: CPT | Mod: 59

## 2021-06-15 PROCEDURE — 99291 CRITICAL CARE FIRST HOUR: CPT | Mod: 25

## 2021-06-15 PROCEDURE — G0378 HOSPITAL OBSERVATION PER HR: HCPCS

## 2021-06-15 PROCEDURE — U0002 COVID-19 LAB TEST NON-CDC: HCPCS | Performed by: PHYSICIAN ASSISTANT

## 2021-06-15 PROCEDURE — 85025 COMPLETE CBC W/AUTO DIFF WBC: CPT | Performed by: EMERGENCY MEDICINE

## 2021-06-15 PROCEDURE — 99204 PR OFFICE/OUTPT VISIT, NEW, LEVL IV, 45-59 MIN: ICD-10-PCS | Mod: ,,, | Performed by: PHYSICIAN ASSISTANT

## 2021-06-15 PROCEDURE — 25500020 PHARM REV CODE 255: Performed by: EMERGENCY MEDICINE

## 2021-06-15 PROCEDURE — 99204 OFFICE O/P NEW MOD 45 MIN: CPT | Mod: ,,, | Performed by: PHYSICIAN ASSISTANT

## 2021-06-15 PROCEDURE — 99900035 HC TECH TIME PER 15 MIN (STAT)

## 2021-06-15 PROCEDURE — 99220 PR INITIAL OBSERVATION CARE,LEVL III: CPT | Mod: ,,, | Performed by: PHYSICIAN ASSISTANT

## 2021-06-15 PROCEDURE — 80307 DRUG TEST PRSMV CHEM ANLYZR: CPT | Performed by: EMERGENCY MEDICINE

## 2021-06-15 PROCEDURE — 82803 BLOOD GASES ANY COMBINATION: CPT

## 2021-06-15 PROCEDURE — 82800 BLOOD PH: CPT

## 2021-06-15 PROCEDURE — 86900 BLOOD TYPING SEROLOGIC ABO: CPT | Performed by: EMERGENCY MEDICINE

## 2021-06-15 PROCEDURE — 85730 THROMBOPLASTIN TIME PARTIAL: CPT | Performed by: EMERGENCY MEDICINE

## 2021-06-15 PROCEDURE — 81003 URINALYSIS AUTO W/O SCOPE: CPT | Mod: 59 | Performed by: EMERGENCY MEDICINE

## 2021-06-15 PROCEDURE — 82962 GLUCOSE BLOOD TEST: CPT

## 2021-06-15 PROCEDURE — 93005 ELECTROCARDIOGRAM TRACING: CPT

## 2021-06-15 PROCEDURE — 85610 PROTHROMBIN TIME: CPT | Performed by: EMERGENCY MEDICINE

## 2021-06-15 PROCEDURE — 93010 EKG 12-LEAD: ICD-10-PCS | Mod: ,,, | Performed by: INTERNAL MEDICINE

## 2021-06-15 PROCEDURE — 80053 COMPREHEN METABOLIC PANEL: CPT | Performed by: EMERGENCY MEDICINE

## 2021-06-15 PROCEDURE — 99220 PR INITIAL OBSERVATION CARE,LEVL III: ICD-10-PCS | Mod: ,,, | Performed by: PHYSICIAN ASSISTANT

## 2021-06-15 PROCEDURE — 82330 ASSAY OF CALCIUM: CPT

## 2021-06-15 PROCEDURE — 96374 THER/PROPH/DIAG INJ IV PUSH: CPT | Mod: 59

## 2021-06-15 PROCEDURE — 84484 ASSAY OF TROPONIN QUANT: CPT | Performed by: EMERGENCY MEDICINE

## 2021-06-15 PROCEDURE — 83880 ASSAY OF NATRIURETIC PEPTIDE: CPT | Performed by: EMERGENCY MEDICINE

## 2021-06-15 PROCEDURE — 96361 HYDRATE IV INFUSION ADD-ON: CPT

## 2021-06-15 RX ORDER — NAPROXEN SODIUM 220 MG/1
81 TABLET, FILM COATED ORAL 2 TIMES DAILY
Status: DISCONTINUED | OUTPATIENT
Start: 2021-06-16 | End: 2021-06-17

## 2021-06-15 RX ORDER — ONDANSETRON 8 MG/1
8 TABLET, ORALLY DISINTEGRATING ORAL EVERY 8 HOURS PRN
Status: DISCONTINUED | OUTPATIENT
Start: 2021-06-16 | End: 2021-06-18 | Stop reason: HOSPADM

## 2021-06-15 RX ORDER — GLUCAGON 1 MG
1 KIT INJECTION
Status: DISCONTINUED | OUTPATIENT
Start: 2021-06-16 | End: 2021-06-18 | Stop reason: HOSPADM

## 2021-06-15 RX ORDER — POLYETHYLENE GLYCOL 3350 17 G/17G
17 POWDER, FOR SOLUTION ORAL DAILY
Status: DISCONTINUED | OUTPATIENT
Start: 2021-06-16 | End: 2021-06-16

## 2021-06-15 RX ORDER — BISACODYL 10 MG
10 SUPPOSITORY, RECTAL RECTAL DAILY PRN
Status: DISCONTINUED | OUTPATIENT
Start: 2021-06-16 | End: 2021-06-18 | Stop reason: HOSPADM

## 2021-06-15 RX ORDER — IBUPROFEN 200 MG
24 TABLET ORAL
Status: DISCONTINUED | OUTPATIENT
Start: 2021-06-16 | End: 2021-06-18 | Stop reason: HOSPADM

## 2021-06-15 RX ORDER — IPRATROPIUM BROMIDE AND ALBUTEROL SULFATE 2.5; .5 MG/3ML; MG/3ML
3 SOLUTION RESPIRATORY (INHALATION) EVERY 4 HOURS PRN
Status: DISCONTINUED | OUTPATIENT
Start: 2021-06-16 | End: 2021-06-18 | Stop reason: HOSPADM

## 2021-06-15 RX ORDER — ONDANSETRON 2 MG/ML
4 INJECTION INTRAMUSCULAR; INTRAVENOUS EVERY 8 HOURS PRN
Status: DISCONTINUED | OUTPATIENT
Start: 2021-06-16 | End: 2021-06-18 | Stop reason: HOSPADM

## 2021-06-15 RX ORDER — IBUPROFEN 200 MG
16 TABLET ORAL
Status: DISCONTINUED | OUTPATIENT
Start: 2021-06-16 | End: 2021-06-18 | Stop reason: HOSPADM

## 2021-06-15 RX ORDER — NALOXONE HYDROCHLORIDE 1 MG/ML
0.4 INJECTION INTRAMUSCULAR; INTRAVENOUS; SUBCUTANEOUS ONCE
Status: COMPLETED | OUTPATIENT
Start: 2021-06-15 | End: 2021-06-15

## 2021-06-15 RX ORDER — TALC
6 POWDER (GRAM) TOPICAL NIGHTLY PRN
Status: DISCONTINUED | OUTPATIENT
Start: 2021-06-16 | End: 2021-06-17

## 2021-06-15 RX ORDER — SODIUM CHLORIDE 0.9 % (FLUSH) 0.9 %
5 SYRINGE (ML) INJECTION
Status: DISCONTINUED | OUTPATIENT
Start: 2021-06-16 | End: 2021-06-18 | Stop reason: HOSPADM

## 2021-06-15 RX ORDER — ACETAMINOPHEN 325 MG/1
650 TABLET ORAL EVERY 4 HOURS PRN
Status: DISCONTINUED | OUTPATIENT
Start: 2021-06-16 | End: 2021-06-18 | Stop reason: HOSPADM

## 2021-06-15 RX ORDER — ACETAMINOPHEN 500 MG
1000 TABLET ORAL EVERY 8 HOURS PRN
Status: DISCONTINUED | OUTPATIENT
Start: 2021-06-16 | End: 2021-06-18 | Stop reason: HOSPADM

## 2021-06-15 RX ADMIN — IOHEXOL 100 ML: 350 INJECTION, SOLUTION INTRAVENOUS at 07:06

## 2021-06-15 RX ADMIN — NALOXONE HYDROCHLORIDE 0.4 MG: 1 INJECTION PARENTERAL at 07:06

## 2021-06-15 RX ADMIN — SODIUM CHLORIDE, SODIUM LACTATE, POTASSIUM CHLORIDE, AND CALCIUM CHLORIDE 1000 ML: .6; .31; .03; .02 INJECTION, SOLUTION INTRAVENOUS at 10:06

## 2021-06-15 RX ADMIN — SODIUM CHLORIDE, SODIUM LACTATE, POTASSIUM CHLORIDE, AND CALCIUM CHLORIDE 1000 ML: .6; .31; .03; .02 INJECTION, SOLUTION INTRAVENOUS at 07:06

## 2021-06-15 RX ADMIN — IOHEXOL 50 ML: 350 INJECTION, SOLUTION INTRAVENOUS at 09:06
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
## 2021-06-16 PROBLEM — G62.9 NEUROPATHY: Status: ACTIVE | Noted: 2021-06-16

## 2021-06-16 PROBLEM — Z98.890 STATUS POST LUMBAR SPINE OPERATION: Status: ACTIVE | Noted: 2021-06-16

## 2021-06-16 PROBLEM — I10 HYPERTENSION: Status: ACTIVE | Noted: 2021-06-16

## 2021-06-16 PROBLEM — E11.9 TYPE 2 DIABETES MELLITUS: Status: ACTIVE | Noted: 2021-06-16

## 2021-06-16 PROBLEM — I10 HTN (HYPERTENSION): Status: ACTIVE | Noted: 2021-06-16

## 2021-06-16 PROBLEM — E78.5 HYPERLIPIDEMIA: Status: ACTIVE | Noted: 2021-06-16

## 2021-06-16 PROBLEM — E66.01 MORBID OBESITY: Status: ACTIVE | Noted: 2021-06-16

## 2021-06-16 LAB
ANION GAP SERPL CALC-SCNC: 13 MMOL/L (ref 8–16)
ASCENDING AORTA: 3.4 CM
AV INDEX (PROSTH): 0.64
AV MEAN GRADIENT: 4 MMHG
AV PEAK GRADIENT: 7 MMHG
AV VALVE AREA: 2.16 CM2
AV VELOCITY RATIO: 0.69
BASOPHILS # BLD AUTO: 0.04 K/UL (ref 0–0.2)
BASOPHILS NFR BLD: 0.6 % (ref 0–1.9)
BILIRUB UR QL STRIP: NEGATIVE
BSA FOR ECHO PROCEDURE: 2.27 M2
BUN SERPL-MCNC: 16 MG/DL (ref 8–23)
CALCIUM SERPL-MCNC: 9.4 MG/DL (ref 8.7–10.5)
CHLORIDE SERPL-SCNC: 104 MMOL/L (ref 95–110)
CHOLEST SERPL-MCNC: 201 MG/DL (ref 120–199)
CHOLEST/HDLC SERPL: 4.8 {RATIO} (ref 2–5)
CLARITY UR REFRACT.AUTO: CLEAR
CO2 SERPL-SCNC: 25 MMOL/L (ref 23–29)
COLOR UR AUTO: ABNORMAL
CREAT SERPL-MCNC: 0.9 MG/DL (ref 0.5–1.4)
CV ECHO LV RWT: 0.36 CM
DIFFERENTIAL METHOD: ABNORMAL
DOP CALC AO PEAK VEL: 1.29 M/S
DOP CALC AO VTI: 26.31 CM
DOP CALC LVOT AREA: 3.4 CM2
DOP CALC LVOT DIAMETER: 2.08 CM
DOP CALC LVOT PEAK VEL: 0.89 M/S
DOP CALC LVOT STROKE VOLUME: 56.78 CM3
DOP CALCLVOT PEAK VEL VTI: 16.72 CM
E WAVE DECELERATION TIME: 241.59 MSEC
E/A RATIO: 0.57
E/E' RATIO: 11.67 M/S
ECHO LV POSTERIOR WALL: 0.88 CM (ref 0.6–1.1)
EJECTION FRACTION: 60 %
EOSINOPHIL # BLD AUTO: 0.1 K/UL (ref 0–0.5)
EOSINOPHIL NFR BLD: 1.5 % (ref 0–8)
ERYTHROCYTE [DISTWIDTH] IN BLOOD BY AUTOMATED COUNT: 15.8 % (ref 11.5–14.5)
EST. GFR  (AFRICAN AMERICAN): >60 ML/MIN/1.73 M^2
EST. GFR  (NON AFRICAN AMERICAN): >60 ML/MIN/1.73 M^2
ESTIMATED AVG GLUCOSE: 143 MG/DL (ref 68–131)
FRACTIONAL SHORTENING: 42 % (ref 28–44)
GLUCOSE SERPL-MCNC: 131 MG/DL (ref 70–110)
GLUCOSE UR QL STRIP: NEGATIVE
HBA1C MFR BLD: 6.6 % (ref 4–5.6)
HCT VFR BLD AUTO: 45.8 % (ref 37–48.5)
HDLC SERPL-MCNC: 42 MG/DL (ref 40–75)
HDLC SERPL: 20.9 % (ref 20–50)
HGB BLD-MCNC: 13.8 G/DL (ref 12–16)
HGB UR QL STRIP: NEGATIVE
IMM GRANULOCYTES # BLD AUTO: 0.02 K/UL (ref 0–0.04)
IMM GRANULOCYTES NFR BLD AUTO: 0.3 % (ref 0–0.5)
INTERVENTRICULAR SEPTUM: 0.93 CM (ref 0.6–1.1)
KETONES UR QL STRIP: ABNORMAL
LA MAJOR: 4.97 CM
LA MINOR: 5 CM
LA WIDTH: 4.37 CM
LDLC SERPL CALC-MCNC: 141.4 MG/DL (ref 63–159)
LEFT ATRIUM SIZE: 2.8 CM
LEFT ATRIUM VOLUME INDEX MOD: 15.5 ML/M2
LEFT ATRIUM VOLUME INDEX: 24.1 ML/M2
LEFT ATRIUM VOLUME MOD: 33.22 CM3
LEFT ATRIUM VOLUME: 51.85 CM3
LEFT INTERNAL DIMENSION IN SYSTOLE: 2.87 CM (ref 2.1–4)
LEFT VENTRICLE DIASTOLIC VOLUME INDEX: 52.64 ML/M2
LEFT VENTRICLE DIASTOLIC VOLUME: 113.18 ML
LEFT VENTRICLE MASS INDEX: 72 G/M2
LEFT VENTRICLE SYSTOLIC VOLUME INDEX: 14.6 ML/M2
LEFT VENTRICLE SYSTOLIC VOLUME: 31.32 ML
LEFT VENTRICULAR INTERNAL DIMENSION IN DIASTOLE: 4.91 CM (ref 3.5–6)
LEFT VENTRICULAR MASS: 154.6 G
LEUKOCYTE ESTERASE UR QL STRIP: NEGATIVE
LV LATERAL E/E' RATIO: 8.75 M/S
LV SEPTAL E/E' RATIO: 17.5 M/S
LYMPHOCYTES # BLD AUTO: 1.9 K/UL (ref 1–4.8)
LYMPHOCYTES NFR BLD: 30.7 % (ref 18–48)
MAGNESIUM SERPL-MCNC: 2 MG/DL (ref 1.6–2.6)
MCH RBC QN AUTO: 24.8 PG (ref 27–31)
MCHC RBC AUTO-ENTMCNC: 30.1 G/DL (ref 32–36)
MCV RBC AUTO: 82 FL (ref 82–98)
MONOCYTES # BLD AUTO: 0.5 K/UL (ref 0.3–1)
MONOCYTES NFR BLD: 8.7 % (ref 4–15)
MV A" WAVE DURATION": 15.7 MSEC
MV PEAK A VEL: 1.23 M/S
MV PEAK E VEL: 0.7 M/S
MV STENOSIS PRESSURE HALF TIME: 70.06 MS
MV VALVE AREA P 1/2 METHOD: 3.14 CM2
NEUTROPHILS # BLD AUTO: 3.6 K/UL (ref 1.8–7.7)
NEUTROPHILS NFR BLD: 58.2 % (ref 38–73)
NITRITE UR QL STRIP: NEGATIVE
NONHDLC SERPL-MCNC: 159 MG/DL
NRBC BLD-RTO: 0 /100 WBC
PH UR STRIP: 6 [PH] (ref 5–8)
PHOSPHATE SERPL-MCNC: 3.8 MG/DL (ref 2.7–4.5)
PISA TR MAX VEL: 2.46 M/S
PLATELET # BLD AUTO: 163 K/UL (ref 150–450)
PMV BLD AUTO: 12.2 FL (ref 9.2–12.9)
POCT GLUCOSE: 123 MG/DL (ref 70–110)
POCT GLUCOSE: 149 MG/DL (ref 70–110)
POTASSIUM SERPL-SCNC: 4.1 MMOL/L (ref 3.5–5.1)
PROT UR QL STRIP: NEGATIVE
PULM VEIN S/D RATIO: 1.87
PV PEAK D VEL: 0.31 M/S
PV PEAK S VEL: 0.58 M/S
RA MAJOR: 4.81 CM
RA PRESSURE: 3 MMHG
RA WIDTH: 2.8 CM
RBC # BLD AUTO: 5.56 M/UL (ref 4–5.4)
RIGHT VENTRICULAR END-DIASTOLIC DIMENSION: 2.52 CM
RV TISSUE DOPPLER FREE WALL SYSTOLIC VELOCITY 1 (APICAL 4 CHAMBER VIEW): 9.68 CM/S
SINUS: 3.5 CM
SODIUM SERPL-SCNC: 142 MMOL/L (ref 136–145)
SP GR UR STRIP: 1.01 (ref 1–1.03)
STJ: 3.1 CM
TDI LATERAL: 0.08 M/S
TDI SEPTAL: 0.04 M/S
TDI: 0.06 M/S
TR MAX PG: 24 MMHG
TRICUSPID ANNULAR PLANE SYSTOLIC EXCURSION: 2.25 CM
TRIGL SERPL-MCNC: 88 MG/DL (ref 30–150)
TSH SERPL DL<=0.005 MIU/L-ACNC: 1.05 UIU/ML (ref 0.4–4)
TV REST PULMONARY ARTERY PRESSURE: 27 MMHG
URN SPEC COLLECT METH UR: ABNORMAL
WBC # BLD AUTO: 6.19 K/UL (ref 3.9–12.7)

## 2021-06-16 PROCEDURE — 83735 ASSAY OF MAGNESIUM: CPT | Performed by: PHYSICIAN ASSISTANT

## 2021-06-16 PROCEDURE — 80048 BASIC METABOLIC PNL TOTAL CA: CPT | Performed by: PHYSICIAN ASSISTANT

## 2021-06-16 PROCEDURE — 85025 COMPLETE CBC W/AUTO DIFF WBC: CPT | Performed by: PHYSICIAN ASSISTANT

## 2021-06-16 PROCEDURE — 25000003 PHARM REV CODE 250: Performed by: PHYSICIAN ASSISTANT

## 2021-06-16 PROCEDURE — 94761 N-INVAS EAR/PLS OXIMETRY MLT: CPT

## 2021-06-16 PROCEDURE — 96361 HYDRATE IV INFUSION ADD-ON: CPT

## 2021-06-16 PROCEDURE — 80061 LIPID PANEL: CPT | Performed by: PHYSICIAN ASSISTANT

## 2021-06-16 PROCEDURE — 97535 SELF CARE MNGMENT TRAINING: CPT

## 2021-06-16 PROCEDURE — 97166 OT EVAL MOD COMPLEX 45 MIN: CPT

## 2021-06-16 PROCEDURE — 84100 ASSAY OF PHOSPHORUS: CPT | Performed by: PHYSICIAN ASSISTANT

## 2021-06-16 PROCEDURE — 84443 ASSAY THYROID STIM HORMONE: CPT | Performed by: PHYSICIAN ASSISTANT

## 2021-06-16 PROCEDURE — G0378 HOSPITAL OBSERVATION PER HR: HCPCS

## 2021-06-16 PROCEDURE — 99226 PR SUBSEQUENT OBSERVATION CARE,LEVEL III: CPT | Mod: CS,,, | Performed by: HOSPITALIST

## 2021-06-16 PROCEDURE — 83036 HEMOGLOBIN GLYCOSYLATED A1C: CPT | Performed by: PHYSICIAN ASSISTANT

## 2021-06-16 PROCEDURE — 25000003 PHARM REV CODE 250: Performed by: HOSPITALIST

## 2021-06-16 PROCEDURE — 99226 PR SUBSEQUENT OBSERVATION CARE,LEVEL III: ICD-10-PCS | Mod: CS,,, | Performed by: HOSPITALIST

## 2021-06-16 RX ORDER — OMEPRAZOLE 20 MG/1
20 CAPSULE, DELAYED RELEASE ORAL
COMMUNITY

## 2021-06-16 RX ORDER — SULINDAC 150 MG/1
150 TABLET ORAL 2 TIMES DAILY
Status: ON HOLD | COMMUNITY
End: 2021-06-18 | Stop reason: HOSPADM

## 2021-06-16 RX ORDER — AMLODIPINE BESYLATE 10 MG/1
10 TABLET ORAL DAILY
Status: DISCONTINUED | OUTPATIENT
Start: 2021-06-16 | End: 2021-06-18 | Stop reason: HOSPADM

## 2021-06-16 RX ORDER — INSULIN ASPART 100 [IU]/ML
0-5 INJECTION, SOLUTION INTRAVENOUS; SUBCUTANEOUS
Status: DISCONTINUED | OUTPATIENT
Start: 2021-06-16 | End: 2021-06-18 | Stop reason: HOSPADM

## 2021-06-16 RX ORDER — ZOLPIDEM TARTRATE 10 MG/1
10 TABLET ORAL NIGHTLY PRN
Status: ON HOLD | COMMUNITY
Start: 2021-05-25 | End: 2021-06-18 | Stop reason: HOSPADM

## 2021-06-16 RX ORDER — ATORVASTATIN CALCIUM 20 MG/1
40 TABLET, FILM COATED ORAL NIGHTLY
Status: DISCONTINUED | OUTPATIENT
Start: 2021-06-16 | End: 2021-06-18 | Stop reason: HOSPADM

## 2021-06-16 RX ORDER — PREGABALIN 150 MG/1
150 CAPSULE ORAL 2 TIMES DAILY
Status: ON HOLD | COMMUNITY
End: 2021-06-18 | Stop reason: HOSPADM

## 2021-06-16 RX ORDER — BENZONATATE 100 MG/1
100 CAPSULE ORAL 3 TIMES DAILY PRN
COMMUNITY
Start: 2021-04-20

## 2021-06-16 RX ORDER — SULINDAC 150 MG/1
150 TABLET ORAL 2 TIMES DAILY
Status: ON HOLD | COMMUNITY
Start: 2021-05-07 | End: 2021-06-16

## 2021-06-16 RX ORDER — UBIDECARENONE 75 MG
500 CAPSULE ORAL DAILY
Status: ON HOLD | COMMUNITY
Start: 2017-08-29 | End: 2021-06-16

## 2021-06-16 RX ORDER — DULOXETIN HYDROCHLORIDE 30 MG/1
30 CAPSULE, DELAYED RELEASE ORAL DAILY
COMMUNITY

## 2021-06-16 RX ORDER — LEVOCETIRIZINE DIHYDROCHLORIDE 5 MG/1
1 TABLET, FILM COATED ORAL NIGHTLY PRN
Status: ON HOLD | COMMUNITY
Start: 2021-05-13 | End: 2021-06-18 | Stop reason: HOSPADM

## 2021-06-16 RX ORDER — LISINOPRIL 20 MG/1
40 TABLET ORAL DAILY
Status: DISCONTINUED | OUTPATIENT
Start: 2021-06-16 | End: 2021-06-18 | Stop reason: HOSPADM

## 2021-06-16 RX ORDER — PREDNISOLONE ACETATE 10 MG/ML
1 SUSPENSION/ DROPS OPHTHALMIC 4 TIMES DAILY
COMMUNITY

## 2021-06-16 RX ORDER — POLYETHYLENE GLYCOL 3350 17 G/17G
17 POWDER, FOR SOLUTION ORAL 2 TIMES DAILY PRN
Status: DISCONTINUED | OUTPATIENT
Start: 2021-06-16 | End: 2021-06-18 | Stop reason: HOSPADM

## 2021-06-16 RX ORDER — PREGABALIN 150 MG/1
150 CAPSULE ORAL 2 TIMES DAILY
Status: DISCONTINUED | OUTPATIENT
Start: 2021-06-16 | End: 2021-06-16

## 2021-06-16 RX ORDER — BENZONATATE 100 MG/1
100 CAPSULE ORAL 3 TIMES DAILY PRN
Status: DISCONTINUED | OUTPATIENT
Start: 2021-06-16 | End: 2021-06-18 | Stop reason: HOSPADM

## 2021-06-16 RX ORDER — HYDRALAZINE HYDROCHLORIDE 25 MG/1
25 TABLET, FILM COATED ORAL EVERY 8 HOURS PRN
Status: DISCONTINUED | OUTPATIENT
Start: 2021-06-16 | End: 2021-06-18 | Stop reason: HOSPADM

## 2021-06-16 RX ORDER — AMLODIPINE BESYLATE 10 MG/1
10 TABLET ORAL DAILY
COMMUNITY
Start: 2021-06-10

## 2021-06-16 RX ORDER — BACLOFEN 10 MG/1
10 TABLET ORAL 2 TIMES DAILY
Status: ON HOLD | COMMUNITY
End: 2021-06-18 | Stop reason: SDUPTHER

## 2021-06-16 RX ADMIN — ATORVASTATIN CALCIUM 40 MG: 20 TABLET, FILM COATED ORAL at 08:06

## 2021-06-16 RX ADMIN — ASPIRIN 81 MG CHEWABLE TABLET 81 MG: 81 TABLET CHEWABLE at 03:06

## 2021-06-16 RX ADMIN — LISINOPRIL 40 MG: 20 TABLET ORAL at 03:06

## 2021-06-16 RX ADMIN — ASPIRIN 81 MG CHEWABLE TABLET 81 MG: 81 TABLET CHEWABLE at 08:06

## 2021-06-16 RX ADMIN — AMLODIPINE BESYLATE 10 MG: 10 TABLET ORAL at 03:06

## 2021-06-17 LAB
ANION GAP SERPL CALC-SCNC: 11 MMOL/L (ref 8–16)
BASOPHILS # BLD AUTO: 0.03 K/UL (ref 0–0.2)
BASOPHILS NFR BLD: 0.7 % (ref 0–1.9)
BUN SERPL-MCNC: 14 MG/DL (ref 8–23)
CALCIUM SERPL-MCNC: 9.6 MG/DL (ref 8.7–10.5)
CHLORIDE SERPL-SCNC: 106 MMOL/L (ref 95–110)
CO2 SERPL-SCNC: 25 MMOL/L (ref 23–29)
CREAT SERPL-MCNC: 0.7 MG/DL (ref 0.5–1.4)
DIFFERENTIAL METHOD: ABNORMAL
EOSINOPHIL # BLD AUTO: 0.1 K/UL (ref 0–0.5)
EOSINOPHIL NFR BLD: 3.2 % (ref 0–8)
ERYTHROCYTE [DISTWIDTH] IN BLOOD BY AUTOMATED COUNT: 15.9 % (ref 11.5–14.5)
EST. GFR  (AFRICAN AMERICAN): >60 ML/MIN/1.73 M^2
EST. GFR  (NON AFRICAN AMERICAN): >60 ML/MIN/1.73 M^2
GLUCOSE SERPL-MCNC: 166 MG/DL (ref 70–110)
HCT VFR BLD AUTO: 45.4 % (ref 37–48.5)
HGB BLD-MCNC: 13.7 G/DL (ref 12–16)
IMM GRANULOCYTES # BLD AUTO: 0.01 K/UL (ref 0–0.04)
IMM GRANULOCYTES NFR BLD AUTO: 0.2 % (ref 0–0.5)
LYMPHOCYTES # BLD AUTO: 1.3 K/UL (ref 1–4.8)
LYMPHOCYTES NFR BLD: 29.5 % (ref 18–48)
MAGNESIUM SERPL-MCNC: 1.7 MG/DL (ref 1.6–2.6)
MCH RBC QN AUTO: 24.3 PG (ref 27–31)
MCHC RBC AUTO-ENTMCNC: 30.2 G/DL (ref 32–36)
MCV RBC AUTO: 81 FL (ref 82–98)
MONOCYTES # BLD AUTO: 0.3 K/UL (ref 0.3–1)
MONOCYTES NFR BLD: 7 % (ref 4–15)
NEUTROPHILS # BLD AUTO: 2.6 K/UL (ref 1.8–7.7)
NEUTROPHILS NFR BLD: 59.4 % (ref 38–73)
NRBC BLD-RTO: 0 /100 WBC
PHOSPHATE SERPL-MCNC: 2.8 MG/DL (ref 2.7–4.5)
PLATELET # BLD AUTO: 157 K/UL (ref 150–450)
PMV BLD AUTO: 11.6 FL (ref 9.2–12.9)
POCT GLUCOSE: 111 MG/DL (ref 70–110)
POCT GLUCOSE: 152 MG/DL (ref 70–110)
POTASSIUM SERPL-SCNC: 3.6 MMOL/L (ref 3.5–5.1)
RBC # BLD AUTO: 5.63 M/UL (ref 4–5.4)
SODIUM SERPL-SCNC: 142 MMOL/L (ref 136–145)
WBC # BLD AUTO: 4.31 K/UL (ref 3.9–12.7)

## 2021-06-17 PROCEDURE — 83735 ASSAY OF MAGNESIUM: CPT | Performed by: PHYSICIAN ASSISTANT

## 2021-06-17 PROCEDURE — 99225 PR SUBSEQUENT OBSERVATION CARE,LEVEL II: ICD-10-PCS | Mod: 95,,, | Performed by: INTERNAL MEDICINE

## 2021-06-17 PROCEDURE — 25000003 PHARM REV CODE 250: Performed by: HOSPITALIST

## 2021-06-17 PROCEDURE — 84100 ASSAY OF PHOSPHORUS: CPT | Performed by: PHYSICIAN ASSISTANT

## 2021-06-17 PROCEDURE — 36415 COLL VENOUS BLD VENIPUNCTURE: CPT | Performed by: PHYSICIAN ASSISTANT

## 2021-06-17 PROCEDURE — 25000003 PHARM REV CODE 250: Performed by: PHYSICIAN ASSISTANT

## 2021-06-17 PROCEDURE — G0378 HOSPITAL OBSERVATION PER HR: HCPCS

## 2021-06-17 PROCEDURE — 80048 BASIC METABOLIC PNL TOTAL CA: CPT | Performed by: PHYSICIAN ASSISTANT

## 2021-06-17 PROCEDURE — 25000003 PHARM REV CODE 250: Performed by: INTERNAL MEDICINE

## 2021-06-17 PROCEDURE — 85025 COMPLETE CBC W/AUTO DIFF WBC: CPT | Performed by: PHYSICIAN ASSISTANT

## 2021-06-17 PROCEDURE — 97161 PT EVAL LOW COMPLEX 20 MIN: CPT

## 2021-06-17 PROCEDURE — 99225 PR SUBSEQUENT OBSERVATION CARE,LEVEL II: CPT | Mod: 95,,, | Performed by: INTERNAL MEDICINE

## 2021-06-17 RX ORDER — TALC
6 POWDER (GRAM) TOPICAL NIGHTLY
Status: DISCONTINUED | OUTPATIENT
Start: 2021-06-17 | End: 2021-06-18 | Stop reason: HOSPADM

## 2021-06-17 RX ORDER — IBUPROFEN 600 MG/1
600 TABLET ORAL EVERY 8 HOURS PRN
Status: DISCONTINUED | OUTPATIENT
Start: 2021-06-17 | End: 2021-06-17

## 2021-06-17 RX ORDER — NAPROXEN SODIUM 220 MG/1
81 TABLET, FILM COATED ORAL DAILY
Status: DISCONTINUED | OUTPATIENT
Start: 2021-06-18 | End: 2021-06-18 | Stop reason: HOSPADM

## 2021-06-17 RX ORDER — KETOROLAC TROMETHAMINE 15 MG/ML
15 INJECTION, SOLUTION INTRAMUSCULAR; INTRAVENOUS ONCE
Status: DISCONTINUED | OUTPATIENT
Start: 2021-06-17 | End: 2021-06-18 | Stop reason: HOSPADM

## 2021-06-17 RX ADMIN — LISINOPRIL 40 MG: 20 TABLET ORAL at 08:06

## 2021-06-17 RX ADMIN — MELATONIN TAB 3 MG 6 MG: 3 TAB at 08:06

## 2021-06-17 RX ADMIN — ATORVASTATIN CALCIUM 40 MG: 20 TABLET, FILM COATED ORAL at 08:06

## 2021-06-17 RX ADMIN — AMLODIPINE BESYLATE 10 MG: 10 TABLET ORAL at 08:06

## 2021-06-17 RX ADMIN — ASPIRIN 81 MG CHEWABLE TABLET 81 MG: 81 TABLET CHEWABLE at 08:06

## 2021-06-18 VITALS
RESPIRATION RATE: 17 BRPM | TEMPERATURE: 98 F | HEART RATE: 89 BPM | OXYGEN SATURATION: 99 % | WEIGHT: 251 LBS | DIASTOLIC BLOOD PRESSURE: 82 MMHG | SYSTOLIC BLOOD PRESSURE: 130 MMHG | HEIGHT: 64 IN | BODY MASS INDEX: 42.85 KG/M2

## 2021-06-18 LAB
ANION GAP SERPL CALC-SCNC: 10 MMOL/L (ref 8–16)
BASOPHILS # BLD AUTO: 0.03 K/UL (ref 0–0.2)
BASOPHILS NFR BLD: 0.6 % (ref 0–1.9)
BUN SERPL-MCNC: 10 MG/DL (ref 8–23)
CALCIUM SERPL-MCNC: 9.5 MG/DL (ref 8.7–10.5)
CHLORIDE SERPL-SCNC: 105 MMOL/L (ref 95–110)
CO2 SERPL-SCNC: 28 MMOL/L (ref 23–29)
CREAT SERPL-MCNC: 0.8 MG/DL (ref 0.5–1.4)
DIFFERENTIAL METHOD: ABNORMAL
EOSINOPHIL # BLD AUTO: 0.1 K/UL (ref 0–0.5)
EOSINOPHIL NFR BLD: 2.2 % (ref 0–8)
ERYTHROCYTE [DISTWIDTH] IN BLOOD BY AUTOMATED COUNT: 15.5 % (ref 11.5–14.5)
EST. GFR  (AFRICAN AMERICAN): >60 ML/MIN/1.73 M^2
EST. GFR  (NON AFRICAN AMERICAN): >60 ML/MIN/1.73 M^2
GLUCOSE SERPL-MCNC: 171 MG/DL (ref 70–110)
HCT VFR BLD AUTO: 44.2 % (ref 37–48.5)
HGB BLD-MCNC: 13.4 G/DL (ref 12–16)
IMM GRANULOCYTES # BLD AUTO: 0.01 K/UL (ref 0–0.04)
IMM GRANULOCYTES NFR BLD AUTO: 0.2 % (ref 0–0.5)
LYMPHOCYTES # BLD AUTO: 1.4 K/UL (ref 1–4.8)
LYMPHOCYTES NFR BLD: 28.6 % (ref 18–48)
MAGNESIUM SERPL-MCNC: 1.7 MG/DL (ref 1.6–2.6)
MCH RBC QN AUTO: 24.6 PG (ref 27–31)
MCHC RBC AUTO-ENTMCNC: 30.3 G/DL (ref 32–36)
MCV RBC AUTO: 81 FL (ref 82–98)
MONOCYTES # BLD AUTO: 0.5 K/UL (ref 0.3–1)
MONOCYTES NFR BLD: 10.7 % (ref 4–15)
NEUTROPHILS # BLD AUTO: 2.9 K/UL (ref 1.8–7.7)
NEUTROPHILS NFR BLD: 57.7 % (ref 38–73)
NRBC BLD-RTO: 0 /100 WBC
PHOSPHATE SERPL-MCNC: 2.8 MG/DL (ref 2.7–4.5)
PLATELET # BLD AUTO: 169 K/UL (ref 150–450)
PMV BLD AUTO: 12.4 FL (ref 9.2–12.9)
POCT GLUCOSE: 139 MG/DL (ref 70–110)
POTASSIUM SERPL-SCNC: 3.4 MMOL/L (ref 3.5–5.1)
RBC # BLD AUTO: 5.44 M/UL (ref 4–5.4)
SODIUM SERPL-SCNC: 143 MMOL/L (ref 136–145)
WBC # BLD AUTO: 4.96 K/UL (ref 3.9–12.7)

## 2021-06-18 PROCEDURE — 25000003 PHARM REV CODE 250: Performed by: HOSPITALIST

## 2021-06-18 PROCEDURE — G0378 HOSPITAL OBSERVATION PER HR: HCPCS

## 2021-06-18 PROCEDURE — 83735 ASSAY OF MAGNESIUM: CPT | Performed by: PHYSICIAN ASSISTANT

## 2021-06-18 PROCEDURE — 84100 ASSAY OF PHOSPHORUS: CPT | Performed by: PHYSICIAN ASSISTANT

## 2021-06-18 PROCEDURE — 85025 COMPLETE CBC W/AUTO DIFF WBC: CPT | Performed by: PHYSICIAN ASSISTANT

## 2021-06-18 PROCEDURE — 99217 PR OBSERVATION CARE DISCHARGE: CPT | Mod: 95,,, | Performed by: INTERNAL MEDICINE

## 2021-06-18 PROCEDURE — 25000003 PHARM REV CODE 250: Performed by: INTERNAL MEDICINE

## 2021-06-18 PROCEDURE — 99217 PR OBSERVATION CARE DISCHARGE: ICD-10-PCS | Mod: 95,,, | Performed by: INTERNAL MEDICINE

## 2021-06-18 PROCEDURE — 36415 COLL VENOUS BLD VENIPUNCTURE: CPT | Performed by: PHYSICIAN ASSISTANT

## 2021-06-18 PROCEDURE — 80048 BASIC METABOLIC PNL TOTAL CA: CPT | Performed by: PHYSICIAN ASSISTANT

## 2021-06-18 RX ORDER — DICLOFENAC SODIUM 10 MG/G
2 GEL TOPICAL 2 TIMES DAILY
Qty: 100 G | Refills: 1 | Status: SHIPPED | OUTPATIENT
Start: 2021-06-18

## 2021-06-18 RX ORDER — NAPROXEN SODIUM 220 MG/1
81 TABLET, FILM COATED ORAL DAILY
Refills: 0 | Status: ON HOLD
Start: 2021-06-19 | End: 2022-09-24

## 2021-06-18 RX ORDER — POTASSIUM CHLORIDE 20 MEQ/1
40 TABLET, EXTENDED RELEASE ORAL ONCE
Status: COMPLETED | OUTPATIENT
Start: 2021-06-18 | End: 2021-06-18

## 2021-06-18 RX ORDER — DEXTROMETHORPHAN HYDROBROMIDE, GUAIFENESIN 5; 100 MG/5ML; MG/5ML
LIQUID ORAL
Refills: 0 | COMMUNITY
Start: 2021-06-18

## 2021-06-18 RX ORDER — TALC
6 POWDER (GRAM) TOPICAL NIGHTLY PRN
Refills: 0 | COMMUNITY
Start: 2021-06-18

## 2021-06-18 RX ORDER — BACLOFEN 10 MG/1
10 TABLET ORAL NIGHTLY
Start: 2021-06-18

## 2021-06-18 RX ADMIN — AMLODIPINE BESYLATE 10 MG: 10 TABLET ORAL at 08:06

## 2021-06-18 RX ADMIN — POTASSIUM CHLORIDE 40 MEQ: 1500 TABLET, EXTENDED RELEASE ORAL at 10:06

## 2021-06-18 RX ADMIN — LISINOPRIL 40 MG: 20 TABLET ORAL at 08:06

## 2021-06-18 RX ADMIN — ASPIRIN 81 MG CHEWABLE TABLET 81 MG: 81 TABLET CHEWABLE at 08:06

## 2021-06-19 PROCEDURE — G0180 MD CERTIFICATION HHA PATIENT: HCPCS | Mod: ,,, | Performed by: INTERNAL MEDICINE

## 2021-06-19 PROCEDURE — G0180 PR HOME HEALTH MD CERTIFICATION: ICD-10-PCS | Mod: ,,, | Performed by: INTERNAL MEDICINE

## 2021-07-01 ENCOUNTER — EXTERNAL HOME HEALTH (OUTPATIENT)
Dept: HOME HEALTH SERVICES | Facility: HOSPITAL | Age: 81
End: 2021-07-01
Payer: MEDICARE

## 2021-09-22 DIAGNOSIS — M25.562 LEFT KNEE PAIN: Primary | ICD-10-CM

## 2021-09-23 ENCOUNTER — CLINICAL SUPPORT (OUTPATIENT)
Dept: REHABILITATION | Facility: HOSPITAL | Age: 81
End: 2021-09-23
Payer: MEDICARE

## 2021-09-23 DIAGNOSIS — R29.898 WEAKNESS OF BOTH LOWER EXTREMITIES: ICD-10-CM

## 2021-09-23 DIAGNOSIS — R26.89 DECREASED FUNCTIONAL MOBILITY: ICD-10-CM

## 2021-09-23 DIAGNOSIS — R26.9 ABNORMALITY OF GAIT: ICD-10-CM

## 2021-09-23 DIAGNOSIS — M62.89 HAMSTRING TIGHTNESS OF LEFT LOWER EXTREMITY: ICD-10-CM

## 2021-09-23 PROCEDURE — 97162 PT EVAL MOD COMPLEX 30 MIN: CPT | Mod: PO

## 2021-09-27 PROBLEM — R29.898 WEAKNESS OF BOTH LOWER EXTREMITIES: Status: ACTIVE | Noted: 2021-09-27

## 2021-09-27 PROBLEM — M62.89 HAMSTRING TIGHTNESS OF LEFT LOWER EXTREMITY: Status: ACTIVE | Noted: 2021-09-27

## 2021-09-27 PROBLEM — R26.89 DECREASED FUNCTIONAL MOBILITY: Status: ACTIVE | Noted: 2021-09-27

## 2021-09-27 PROBLEM — R26.9 ABNORMALITY OF GAIT: Status: ACTIVE | Noted: 2021-09-27

## 2021-09-28 ENCOUNTER — CLINICAL SUPPORT (OUTPATIENT)
Dept: REHABILITATION | Facility: HOSPITAL | Age: 81
End: 2021-09-28
Payer: MEDICARE

## 2021-09-28 DIAGNOSIS — R26.89 DECREASED FUNCTIONAL MOBILITY: ICD-10-CM

## 2021-09-28 DIAGNOSIS — M62.89 HAMSTRING TIGHTNESS OF LEFT LOWER EXTREMITY: ICD-10-CM

## 2021-09-28 DIAGNOSIS — R29.898 WEAKNESS OF BOTH LOWER EXTREMITIES: ICD-10-CM

## 2021-09-28 DIAGNOSIS — R26.9 ABNORMALITY OF GAIT: ICD-10-CM

## 2021-09-28 PROCEDURE — 97110 THERAPEUTIC EXERCISES: CPT | Mod: PO

## 2021-09-30 ENCOUNTER — CLINICAL SUPPORT (OUTPATIENT)
Dept: REHABILITATION | Facility: HOSPITAL | Age: 81
End: 2021-09-30
Payer: MEDICARE

## 2021-09-30 DIAGNOSIS — R29.898 WEAKNESS OF BOTH LOWER EXTREMITIES: ICD-10-CM

## 2021-09-30 DIAGNOSIS — M62.89 HAMSTRING TIGHTNESS OF LEFT LOWER EXTREMITY: ICD-10-CM

## 2021-09-30 DIAGNOSIS — R26.9 ABNORMALITY OF GAIT: ICD-10-CM

## 2021-09-30 DIAGNOSIS — R26.89 DECREASED FUNCTIONAL MOBILITY: ICD-10-CM

## 2021-09-30 PROCEDURE — 97110 THERAPEUTIC EXERCISES: CPT | Mod: PO,CQ

## 2021-10-05 ENCOUNTER — CLINICAL SUPPORT (OUTPATIENT)
Dept: REHABILITATION | Facility: HOSPITAL | Age: 81
End: 2021-10-05
Payer: MEDICARE

## 2021-10-05 DIAGNOSIS — M62.89 HAMSTRING TIGHTNESS OF LEFT LOWER EXTREMITY: ICD-10-CM

## 2021-10-05 DIAGNOSIS — R26.9 ABNORMALITY OF GAIT: ICD-10-CM

## 2021-10-05 DIAGNOSIS — R29.898 WEAKNESS OF BOTH LOWER EXTREMITIES: ICD-10-CM

## 2021-10-05 DIAGNOSIS — R26.89 DECREASED FUNCTIONAL MOBILITY: ICD-10-CM

## 2021-10-05 PROCEDURE — 97164 PT RE-EVAL EST PLAN CARE: CPT | Mod: KX,PO

## 2021-10-05 PROCEDURE — 97110 THERAPEUTIC EXERCISES: CPT | Mod: KX,PO

## 2021-10-07 ENCOUNTER — CLINICAL SUPPORT (OUTPATIENT)
Dept: REHABILITATION | Facility: HOSPITAL | Age: 81
End: 2021-10-07
Payer: MEDICARE

## 2021-10-07 DIAGNOSIS — R26.9 ABNORMALITY OF GAIT: ICD-10-CM

## 2021-10-07 DIAGNOSIS — R26.89 DECREASED FUNCTIONAL MOBILITY: ICD-10-CM

## 2021-10-07 DIAGNOSIS — M62.89 HAMSTRING TIGHTNESS OF LEFT LOWER EXTREMITY: ICD-10-CM

## 2021-10-07 DIAGNOSIS — R29.898 WEAKNESS OF BOTH LOWER EXTREMITIES: ICD-10-CM

## 2021-10-07 PROCEDURE — 97110 THERAPEUTIC EXERCISES: CPT | Mod: KX,PO,CQ

## 2021-10-12 ENCOUNTER — CLINICAL SUPPORT (OUTPATIENT)
Dept: REHABILITATION | Facility: HOSPITAL | Age: 81
End: 2021-10-12
Payer: MEDICARE

## 2021-10-12 DIAGNOSIS — R29.898 WEAKNESS OF BOTH LOWER EXTREMITIES: ICD-10-CM

## 2021-10-12 DIAGNOSIS — R26.89 DECREASED FUNCTIONAL MOBILITY: ICD-10-CM

## 2021-10-12 DIAGNOSIS — R26.9 ABNORMALITY OF GAIT: ICD-10-CM

## 2021-10-12 DIAGNOSIS — M62.89 HAMSTRING TIGHTNESS OF LEFT LOWER EXTREMITY: ICD-10-CM

## 2021-10-12 PROCEDURE — 97140 MANUAL THERAPY 1/> REGIONS: CPT | Mod: KX,PO

## 2021-10-12 PROCEDURE — 97110 THERAPEUTIC EXERCISES: CPT | Mod: KX,PO

## 2021-10-14 ENCOUNTER — CLINICAL SUPPORT (OUTPATIENT)
Dept: REHABILITATION | Facility: HOSPITAL | Age: 81
End: 2021-10-14
Payer: MEDICARE

## 2021-10-14 DIAGNOSIS — R26.9 ABNORMALITY OF GAIT: ICD-10-CM

## 2021-10-14 DIAGNOSIS — R26.89 DECREASED FUNCTIONAL MOBILITY: ICD-10-CM

## 2021-10-14 DIAGNOSIS — R29.898 WEAKNESS OF BOTH LOWER EXTREMITIES: ICD-10-CM

## 2021-10-14 DIAGNOSIS — M62.89 HAMSTRING TIGHTNESS OF LEFT LOWER EXTREMITY: ICD-10-CM

## 2021-10-14 PROCEDURE — 97140 MANUAL THERAPY 1/> REGIONS: CPT | Mod: PO

## 2021-10-14 PROCEDURE — 97110 THERAPEUTIC EXERCISES: CPT | Mod: KX,PO,CQ

## 2021-10-18 ENCOUNTER — DOCUMENTATION ONLY (OUTPATIENT)
Dept: REHABILITATION | Facility: HOSPITAL | Age: 81
End: 2021-10-18

## 2021-10-21 ENCOUNTER — CLINICAL SUPPORT (OUTPATIENT)
Dept: REHABILITATION | Facility: HOSPITAL | Age: 81
End: 2021-10-21
Payer: MEDICARE

## 2021-10-21 DIAGNOSIS — M62.89 HAMSTRING TIGHTNESS OF LEFT LOWER EXTREMITY: ICD-10-CM

## 2021-10-21 DIAGNOSIS — R29.898 WEAKNESS OF BOTH LOWER EXTREMITIES: ICD-10-CM

## 2021-10-21 DIAGNOSIS — R26.9 ABNORMALITY OF GAIT: ICD-10-CM

## 2021-10-21 DIAGNOSIS — R26.89 DECREASED FUNCTIONAL MOBILITY: ICD-10-CM

## 2021-10-21 PROCEDURE — 97110 THERAPEUTIC EXERCISES: CPT | Mod: PO,CQ

## 2021-10-26 ENCOUNTER — CLINICAL SUPPORT (OUTPATIENT)
Dept: REHABILITATION | Facility: HOSPITAL | Age: 81
End: 2021-10-26
Payer: MEDICARE

## 2021-10-26 DIAGNOSIS — M62.89 HAMSTRING TIGHTNESS OF LEFT LOWER EXTREMITY: ICD-10-CM

## 2021-10-26 DIAGNOSIS — R26.9 ABNORMALITY OF GAIT: ICD-10-CM

## 2021-10-26 DIAGNOSIS — R29.898 WEAKNESS OF BOTH LOWER EXTREMITIES: ICD-10-CM

## 2021-10-26 DIAGNOSIS — R26.89 DECREASED FUNCTIONAL MOBILITY: ICD-10-CM

## 2021-10-26 PROCEDURE — 97110 THERAPEUTIC EXERCISES: CPT | Mod: KX,PO,CQ

## 2021-10-28 ENCOUNTER — CLINICAL SUPPORT (OUTPATIENT)
Dept: REHABILITATION | Facility: HOSPITAL | Age: 81
End: 2021-10-28
Payer: MEDICARE

## 2021-10-28 ENCOUNTER — DOCUMENTATION ONLY (OUTPATIENT)
Dept: REHABILITATION | Facility: HOSPITAL | Age: 81
End: 2021-10-28

## 2021-10-28 DIAGNOSIS — R26.9 ABNORMALITY OF GAIT: ICD-10-CM

## 2021-10-28 DIAGNOSIS — R26.89 DECREASED FUNCTIONAL MOBILITY: ICD-10-CM

## 2021-10-28 DIAGNOSIS — R29.898 WEAKNESS OF BOTH LOWER EXTREMITIES: ICD-10-CM

## 2021-10-28 DIAGNOSIS — M62.89 HAMSTRING TIGHTNESS OF LEFT LOWER EXTREMITY: ICD-10-CM

## 2021-10-28 PROCEDURE — 97110 THERAPEUTIC EXERCISES: CPT | Mod: KX,PO | Performed by: PHYSICAL THERAPIST

## 2021-10-28 PROCEDURE — 97164 PT RE-EVAL EST PLAN CARE: CPT | Mod: KX,PO | Performed by: PHYSICAL THERAPIST

## 2022-05-19 ENCOUNTER — HOSPITAL ENCOUNTER (OUTPATIENT)
Facility: HOSPITAL | Age: 82
Discharge: HOME | End: 2022-05-21
Attending: EMERGENCY MEDICINE | Admitting: EMERGENCY MEDICINE
Payer: MEDICARE

## 2022-05-19 DIAGNOSIS — M62.89 HAMSTRING TIGHTNESS OF LEFT LOWER EXTREMITY: ICD-10-CM

## 2022-05-19 DIAGNOSIS — R26.2 DIFFICULTY IN WALKING: ICD-10-CM

## 2022-05-19 DIAGNOSIS — R06.02 SHORTNESS OF BREATH: ICD-10-CM

## 2022-05-19 DIAGNOSIS — Z98.890 STATUS POST LUMBAR SPINE OPERATION: ICD-10-CM

## 2022-05-19 DIAGNOSIS — J44.1 COPD EXACERBATION: Primary | ICD-10-CM

## 2022-05-19 LAB
ALBUMIN SERPL BCP-MCNC: 3.9 G/DL (ref 3.5–5.2)
ALP SERPL-CCNC: 102 U/L (ref 38–126)
ALT SERPL W/O P-5'-P-CCNC: 35 U/L (ref 10–44)
ANION GAP SERPL CALC-SCNC: 12 MMOL/L (ref 8–16)
AST SERPL-CCNC: 34 U/L (ref 15–46)
BASOPHILS # BLD AUTO: 0.08 K/UL (ref 0–0.2)
BASOPHILS NFR BLD: 1.1 % (ref 0–1.9)
BILIRUB SERPL-MCNC: 0.3 MG/DL (ref 0.1–1)
CALCIUM SERPL-MCNC: 9.2 MG/DL (ref 8.7–10.5)
CHLORIDE SERPL-SCNC: 100 MMOL/L (ref 95–110)
CO2 SERPL-SCNC: 33 MMOL/L (ref 23–29)
CREAT SERPL-MCNC: 0.66 MG/DL (ref 0.5–1.4)
DIFFERENTIAL METHOD: ABNORMAL
EOSINOPHIL # BLD AUTO: 0.2 K/UL (ref 0–0.5)
EOSINOPHIL NFR BLD: 2.7 % (ref 0–8)
ERYTHROCYTE [DISTWIDTH] IN BLOOD BY AUTOMATED COUNT: 14.6 % (ref 11.5–14.5)
EST. GFR  (AFRICAN AMERICAN): >60 ML/MIN/1.73 M^2
EST. GFR  (NON AFRICAN AMERICAN): >60 ML/MIN/1.73 M^2
GLUCOSE SERPL-MCNC: 222 MG/DL (ref 70–110)
HCT VFR BLD AUTO: 44 % (ref 37–48.5)
HGB BLD-MCNC: 12.9 G/DL (ref 12–16)
IMM GRANULOCYTES # BLD AUTO: 0.01 K/UL (ref 0–0.04)
IMM GRANULOCYTES NFR BLD AUTO: 0.1 % (ref 0–0.5)
LYMPHOCYTES # BLD AUTO: 2.3 K/UL (ref 1–4.8)
LYMPHOCYTES NFR BLD: 32.4 % (ref 18–48)
MCH RBC QN AUTO: 25 PG (ref 27–31)
MCHC RBC AUTO-ENTMCNC: 29.3 G/DL (ref 32–36)
MCV RBC AUTO: 85 FL (ref 82–98)
MONOCYTES # BLD AUTO: 0.6 K/UL (ref 0.3–1)
MONOCYTES NFR BLD: 7.9 % (ref 4–15)
NEUTROPHILS # BLD AUTO: 3.9 K/UL (ref 1.8–7.7)
NEUTROPHILS NFR BLD: 55.8 % (ref 38–73)
NRBC BLD-RTO: 0 /100 WBC
NT-PROBNP SERPL-MCNC: 689 PG/ML (ref 5–1800)
PLATELET # BLD AUTO: 178 K/UL (ref 150–450)
PMV BLD AUTO: 11.9 FL (ref 9.2–12.9)
POTASSIUM SERPL-SCNC: 3.1 MMOL/L (ref 3.5–5.1)
PROT SERPL-MCNC: 6.9 G/DL (ref 6–8.4)
RBC # BLD AUTO: 5.17 M/UL (ref 4–5.4)
SARS-COV-2 RDRP RESP QL NAA+PROBE: NEGATIVE
SODIUM SERPL-SCNC: 145 MMOL/L (ref 136–145)
TROPONIN I SERPL-MCNC: 0.03 NG/ML (ref 0.01–0.03)
UUN UR-MCNC: 17 MG/DL (ref 7–17)
WBC # BLD AUTO: 6.97 K/UL (ref 3.9–12.7)

## 2022-05-19 PROCEDURE — 25000242 PHARM REV CODE 250 ALT 637 W/ HCPCS: Mod: ER | Performed by: EMERGENCY MEDICINE

## 2022-05-19 PROCEDURE — 84484 ASSAY OF TROPONIN QUANT: CPT | Mod: ER | Performed by: EMERGENCY MEDICINE

## 2022-05-19 PROCEDURE — 93010 EKG 12-LEAD: ICD-10-PCS | Mod: ,,, | Performed by: INTERNAL MEDICINE

## 2022-05-19 PROCEDURE — 93005 ELECTROCARDIOGRAM TRACING: CPT | Mod: ER

## 2022-05-19 PROCEDURE — 99900035 HC TECH TIME PER 15 MIN (STAT): Mod: ER

## 2022-05-19 PROCEDURE — 94640 AIRWAY INHALATION TREATMENT: CPT | Mod: ER,XB

## 2022-05-19 PROCEDURE — 83880 ASSAY OF NATRIURETIC PEPTIDE: CPT | Mod: ER | Performed by: EMERGENCY MEDICINE

## 2022-05-19 PROCEDURE — 93010 ELECTROCARDIOGRAM REPORT: CPT | Mod: ,,, | Performed by: INTERNAL MEDICINE

## 2022-05-19 PROCEDURE — U0002 COVID-19 LAB TEST NON-CDC: HCPCS | Mod: ER | Performed by: EMERGENCY MEDICINE

## 2022-05-19 PROCEDURE — 94640 AIRWAY INHALATION TREATMENT: CPT | Mod: ER

## 2022-05-19 PROCEDURE — 63600175 PHARM REV CODE 636 W HCPCS: Mod: ER | Performed by: EMERGENCY MEDICINE

## 2022-05-19 PROCEDURE — 94760 N-INVAS EAR/PLS OXIMETRY 1: CPT | Mod: ER

## 2022-05-19 PROCEDURE — 99285 EMERGENCY DEPT VISIT HI MDM: CPT | Mod: 25,ER,CS

## 2022-05-19 PROCEDURE — 85025 COMPLETE CBC W/AUTO DIFF WBC: CPT | Mod: ER | Performed by: EMERGENCY MEDICINE

## 2022-05-19 PROCEDURE — 27000221 HC OXYGEN, UP TO 24 HOURS: Mod: ER

## 2022-05-19 PROCEDURE — 80053 COMPREHEN METABOLIC PANEL: CPT | Mod: ER | Performed by: EMERGENCY MEDICINE

## 2022-05-19 RX ORDER — IPRATROPIUM BROMIDE AND ALBUTEROL SULFATE 2.5; .5 MG/3ML; MG/3ML
3 SOLUTION RESPIRATORY (INHALATION) ONCE
Status: COMPLETED | OUTPATIENT
Start: 2022-05-19 | End: 2022-05-19

## 2022-05-19 RX ORDER — ALBUTEROL SULFATE 2.5 MG/.5ML
2.5 SOLUTION RESPIRATORY (INHALATION)
Status: COMPLETED | OUTPATIENT
Start: 2022-05-19 | End: 2022-05-19

## 2022-05-19 RX ORDER — PREDNISONE 20 MG/1
40 TABLET ORAL
Status: COMPLETED | OUTPATIENT
Start: 2022-05-19 | End: 2022-05-19

## 2022-05-19 RX ORDER — CLONIDINE HYDROCHLORIDE 0.1 MG/1
0.2 TABLET ORAL
Status: DISPENSED | OUTPATIENT
Start: 2022-05-19 | End: 2022-05-20

## 2022-05-19 RX ADMIN — PREDNISONE 40 MG: 20 TABLET ORAL at 09:05

## 2022-05-19 RX ADMIN — ALBUTEROL SULFATE 2.5 MG: 2.5 SOLUTION RESPIRATORY (INHALATION) at 09:05

## 2022-05-19 RX ADMIN — IPRATROPIUM BROMIDE AND ALBUTEROL SULFATE 3 ML: 2.5; .5 SOLUTION RESPIRATORY (INHALATION) at 10:05

## 2022-05-20 PROBLEM — E87.6 HYPOKALEMIA: Status: ACTIVE | Noted: 2022-05-20

## 2022-05-20 PROBLEM — G47.30 SLEEP APNEA: Status: ACTIVE | Noted: 2022-05-20

## 2022-05-20 PROBLEM — J44.1 COPD EXACERBATION: Status: ACTIVE | Noted: 2022-05-20

## 2022-05-20 LAB
POCT GLUCOSE: 268 MG/DL (ref 70–110)
POCT GLUCOSE: 316 MG/DL (ref 70–110)
POCT GLUCOSE: 390 MG/DL (ref 70–110)

## 2022-05-20 PROCEDURE — 25000003 PHARM REV CODE 250: Performed by: FAMILY MEDICINE

## 2022-05-20 PROCEDURE — C9399 UNCLASSIFIED DRUGS OR BIOLOG: HCPCS | Performed by: FAMILY MEDICINE

## 2022-05-20 PROCEDURE — G0378 HOSPITAL OBSERVATION PER HR: HCPCS

## 2022-05-20 PROCEDURE — 63600175 PHARM REV CODE 636 W HCPCS: Performed by: NURSE PRACTITIONER

## 2022-05-20 PROCEDURE — 63600175 PHARM REV CODE 636 W HCPCS: Performed by: FAMILY MEDICINE

## 2022-05-20 PROCEDURE — 96372 THER/PROPH/DIAG INJ SC/IM: CPT | Performed by: FAMILY MEDICINE

## 2022-05-20 PROCEDURE — 99900035 HC TECH TIME PER 15 MIN (STAT)

## 2022-05-20 PROCEDURE — 27000221 HC OXYGEN, UP TO 24 HOURS

## 2022-05-20 PROCEDURE — 94640 AIRWAY INHALATION TREATMENT: CPT | Mod: XB

## 2022-05-20 PROCEDURE — 96372 THER/PROPH/DIAG INJ SC/IM: CPT | Performed by: NURSE PRACTITIONER

## 2022-05-20 PROCEDURE — 25000242 PHARM REV CODE 250 ALT 637 W/ HCPCS: Performed by: NURSE PRACTITIONER

## 2022-05-20 PROCEDURE — 94761 N-INVAS EAR/PLS OXIMETRY MLT: CPT

## 2022-05-20 RX ORDER — ACETAMINOPHEN 325 MG/1
650 TABLET ORAL EVERY 8 HOURS PRN
Status: DISCONTINUED | OUTPATIENT
Start: 2022-05-20 | End: 2022-05-21 | Stop reason: HOSPADM

## 2022-05-20 RX ORDER — ZOLPIDEM TARTRATE 10 MG/1
TABLET ORAL
COMMUNITY

## 2022-05-20 RX ORDER — FLUTICASONE PROPIONATE 50 MCG
2 SPRAY, SUSPENSION (ML) NASAL DAILY
Status: DISCONTINUED | OUTPATIENT
Start: 2022-05-20 | End: 2022-05-21 | Stop reason: HOSPADM

## 2022-05-20 RX ORDER — ATORVASTATIN CALCIUM 20 MG/1
20 TABLET, FILM COATED ORAL NIGHTLY
Status: DISCONTINUED | OUTPATIENT
Start: 2022-05-20 | End: 2022-05-21 | Stop reason: HOSPADM

## 2022-05-20 RX ORDER — ENOXAPARIN SODIUM 100 MG/ML
40 INJECTION SUBCUTANEOUS EVERY 24 HOURS
Status: DISCONTINUED | OUTPATIENT
Start: 2022-05-20 | End: 2022-05-21 | Stop reason: HOSPADM

## 2022-05-20 RX ORDER — FUROSEMIDE 20 MG/1
20 TABLET ORAL DAILY
Status: DISCONTINUED | OUTPATIENT
Start: 2022-05-20 | End: 2022-05-21 | Stop reason: HOSPADM

## 2022-05-20 RX ORDER — POTASSIUM CHLORIDE 750 MG/1
40 TABLET, EXTENDED RELEASE ORAL ONCE
Status: COMPLETED | OUTPATIENT
Start: 2022-05-20 | End: 2022-05-20

## 2022-05-20 RX ORDER — INSULIN ASPART 100 [IU]/ML
0-5 INJECTION, SOLUTION INTRAVENOUS; SUBCUTANEOUS
Status: DISCONTINUED | OUTPATIENT
Start: 2022-05-20 | End: 2022-05-20

## 2022-05-20 RX ORDER — DOXYCYCLINE 100 MG/1
CAPSULE ORAL
Status: ON HOLD | COMMUNITY
End: 2022-05-20

## 2022-05-20 RX ORDER — IBUPROFEN 200 MG
16 TABLET ORAL
Status: DISCONTINUED | OUTPATIENT
Start: 2022-05-20 | End: 2022-05-21 | Stop reason: HOSPADM

## 2022-05-20 RX ORDER — ROSUVASTATIN CALCIUM 5 MG/1
5 TABLET, COATED ORAL DAILY
Status: ON HOLD | COMMUNITY
End: 2022-09-28 | Stop reason: SDUPTHER

## 2022-05-20 RX ORDER — IBUPROFEN 800 MG/1
TABLET ORAL
Status: ON HOLD | COMMUNITY
End: 2022-05-21 | Stop reason: HOSPADM

## 2022-05-20 RX ORDER — GLIPIZIDE 5 MG/1
5 TABLET ORAL 2 TIMES DAILY WITH MEALS
COMMUNITY

## 2022-05-20 RX ORDER — SULINDAC 150 MG/1
TABLET ORAL
Status: ON HOLD | COMMUNITY
End: 2022-05-21 | Stop reason: HOSPADM

## 2022-05-20 RX ORDER — GABAPENTIN 300 MG/1
300 CAPSULE ORAL 2 TIMES DAILY
Status: DISCONTINUED | OUTPATIENT
Start: 2022-05-20 | End: 2022-05-21 | Stop reason: HOSPADM

## 2022-05-20 RX ORDER — ERGOCALCIFEROL 1.25 MG/1
CAPSULE ORAL
COMMUNITY

## 2022-05-20 RX ORDER — INSULIN ASPART 100 [IU]/ML
4 INJECTION, SOLUTION INTRAVENOUS; SUBCUTANEOUS ONCE
Status: COMPLETED | OUTPATIENT
Start: 2022-05-20 | End: 2022-05-20

## 2022-05-20 RX ORDER — INSULIN ASPART 100 [IU]/ML
0-5 INJECTION, SOLUTION INTRAVENOUS; SUBCUTANEOUS
Status: DISCONTINUED | OUTPATIENT
Start: 2022-05-20 | End: 2022-05-21 | Stop reason: HOSPADM

## 2022-05-20 RX ORDER — MINOCYCLINE HYDROCHLORIDE 100 MG/1
CAPSULE ORAL
Status: ON HOLD | COMMUNITY
End: 2022-05-21 | Stop reason: HOSPADM

## 2022-05-20 RX ORDER — LOSARTAN POTASSIUM 100 MG/1
100 TABLET ORAL DAILY
COMMUNITY

## 2022-05-20 RX ORDER — DULOXETIN HYDROCHLORIDE 30 MG/1
30 CAPSULE, DELAYED RELEASE ORAL DAILY
Status: DISCONTINUED | OUTPATIENT
Start: 2022-05-20 | End: 2022-05-21 | Stop reason: HOSPADM

## 2022-05-20 RX ORDER — LEVOCETIRIZINE DIHYDROCHLORIDE 5 MG/1
TABLET, FILM COATED ORAL
COMMUNITY

## 2022-05-20 RX ORDER — ONDANSETRON 2 MG/ML
4 INJECTION INTRAMUSCULAR; INTRAVENOUS EVERY 12 HOURS PRN
Status: DISCONTINUED | OUTPATIENT
Start: 2022-05-20 | End: 2022-05-21 | Stop reason: HOSPADM

## 2022-05-20 RX ORDER — GABAPENTIN 300 MG/1
300 CAPSULE ORAL 3 TIMES DAILY
COMMUNITY

## 2022-05-20 RX ORDER — FUROSEMIDE 20 MG/1
20 TABLET ORAL DAILY
COMMUNITY
Start: 2022-02-18 | End: 2022-06-07

## 2022-05-20 RX ORDER — IBUPROFEN 200 MG
24 TABLET ORAL
Status: DISCONTINUED | OUTPATIENT
Start: 2022-05-20 | End: 2022-05-21 | Stop reason: HOSPADM

## 2022-05-20 RX ORDER — NAPROXEN SODIUM 220 MG/1
81 TABLET, FILM COATED ORAL DAILY
Status: DISCONTINUED | OUTPATIENT
Start: 2022-05-20 | End: 2022-05-21 | Stop reason: HOSPADM

## 2022-05-20 RX ORDER — GLUCAGON 1 MG
1 KIT INJECTION
Status: DISCONTINUED | OUTPATIENT
Start: 2022-05-20 | End: 2022-05-21 | Stop reason: HOSPADM

## 2022-05-20 RX ORDER — SODIUM CHLORIDE 0.9 % (FLUSH) 0.9 %
3 SYRINGE (ML) INJECTION
Status: DISCONTINUED | OUTPATIENT
Start: 2022-05-20 | End: 2022-05-21 | Stop reason: HOSPADM

## 2022-05-20 RX ORDER — ALBUTEROL SULFATE 2.5 MG/.5ML
2.5 SOLUTION RESPIRATORY (INHALATION) EVERY 4 HOURS PRN
Status: DISCONTINUED | OUTPATIENT
Start: 2022-05-20 | End: 2022-05-21 | Stop reason: HOSPADM

## 2022-05-20 RX ORDER — HYDROCHLOROTHIAZIDE 12.5 MG/1
12.5 TABLET ORAL DAILY
Status: ON HOLD | COMMUNITY
Start: 2022-02-18 | End: 2022-09-24 | Stop reason: HOSPADM

## 2022-05-20 RX ORDER — GLUCAGON 1 MG
1 KIT INJECTION
Status: DISCONTINUED | OUTPATIENT
Start: 2022-05-20 | End: 2022-05-20

## 2022-05-20 RX ORDER — AMOXICILLIN 500 MG/1
CAPSULE ORAL
Status: ON HOLD | COMMUNITY
End: 2022-05-21 | Stop reason: HOSPADM

## 2022-05-20 RX ORDER — IBUPROFEN 200 MG
24 TABLET ORAL
Status: DISCONTINUED | OUTPATIENT
Start: 2022-05-20 | End: 2022-05-20

## 2022-05-20 RX ORDER — IBUPROFEN 200 MG
16 TABLET ORAL
Status: DISCONTINUED | OUTPATIENT
Start: 2022-05-20 | End: 2022-05-20

## 2022-05-20 RX ORDER — HYDROCODONE BITARTRATE AND ACETAMINOPHEN 10; 325 MG/1; MG/1
TABLET ORAL
COMMUNITY

## 2022-05-20 RX ORDER — IPRATROPIUM BROMIDE AND ALBUTEROL SULFATE 2.5; .5 MG/3ML; MG/3ML
3 SOLUTION RESPIRATORY (INHALATION) EVERY 4 HOURS
Status: DISCONTINUED | OUTPATIENT
Start: 2022-05-20 | End: 2022-05-21 | Stop reason: HOSPADM

## 2022-05-20 RX ORDER — NEOMYCIN SULFATE, POLYMYXIN B SULFATE AND HYDROCORTISONE 10; 3.5; 1 MG/ML; MG/ML; [USP'U]/ML
SUSPENSION/ DROPS AURICULAR (OTIC)
COMMUNITY

## 2022-05-20 RX ORDER — PREGABALIN 100 MG/1
CAPSULE ORAL
COMMUNITY

## 2022-05-20 RX ORDER — PREDNISONE 20 MG/1
40 TABLET ORAL DAILY
Status: DISCONTINUED | OUTPATIENT
Start: 2022-05-20 | End: 2022-05-21 | Stop reason: HOSPADM

## 2022-05-20 RX ORDER — LOSARTAN POTASSIUM 50 MG/1
50 TABLET ORAL DAILY
Status: DISCONTINUED | OUTPATIENT
Start: 2022-05-20 | End: 2022-05-21 | Stop reason: HOSPADM

## 2022-05-20 RX ORDER — AMLODIPINE BESYLATE 5 MG/1
10 TABLET ORAL DAILY
Status: DISCONTINUED | OUTPATIENT
Start: 2022-05-20 | End: 2022-05-21 | Stop reason: HOSPADM

## 2022-05-20 RX ADMIN — IPRATROPIUM BROMIDE AND ALBUTEROL SULFATE 3 ML: 2.5; .5 SOLUTION RESPIRATORY (INHALATION) at 03:05

## 2022-05-20 RX ADMIN — IPRATROPIUM BROMIDE AND ALBUTEROL SULFATE 3 ML: 2.5; .5 SOLUTION RESPIRATORY (INHALATION) at 11:05

## 2022-05-20 RX ADMIN — GABAPENTIN 300 MG: 300 CAPSULE ORAL at 08:05

## 2022-05-20 RX ADMIN — LOSARTAN POTASSIUM 50 MG: 50 TABLET, FILM COATED ORAL at 09:05

## 2022-05-20 RX ADMIN — POTASSIUM BICARBONATE 25 MEQ: 978 TABLET, EFFERVESCENT ORAL at 02:05

## 2022-05-20 RX ADMIN — ASPIRIN 81 MG CHEWABLE TABLET 81 MG: 81 TABLET CHEWABLE at 09:05

## 2022-05-20 RX ADMIN — FUROSEMIDE 20 MG: 20 TABLET ORAL at 09:05

## 2022-05-20 RX ADMIN — ENOXAPARIN SODIUM 40 MG: 100 INJECTION SUBCUTANEOUS at 04:05

## 2022-05-20 RX ADMIN — GABAPENTIN 300 MG: 300 CAPSULE ORAL at 09:05

## 2022-05-20 RX ADMIN — IPRATROPIUM BROMIDE AND ALBUTEROL SULFATE 3 ML: 2.5; .5 SOLUTION RESPIRATORY (INHALATION) at 08:05

## 2022-05-20 RX ADMIN — IPRATROPIUM BROMIDE AND ALBUTEROL SULFATE 3 ML: 2.5; .5 SOLUTION RESPIRATORY (INHALATION) at 07:05

## 2022-05-20 RX ADMIN — INSULIN ASPART 4 UNITS: 100 INJECTION, SOLUTION INTRAVENOUS; SUBCUTANEOUS at 10:05

## 2022-05-20 RX ADMIN — PREDNISONE 40 MG: 20 TABLET ORAL at 09:05

## 2022-05-20 RX ADMIN — DULOXETINE 30 MG: 30 CAPSULE, DELAYED RELEASE ORAL at 02:05

## 2022-05-20 RX ADMIN — ATORVASTATIN CALCIUM 20 MG: 20 TABLET, FILM COATED ORAL at 08:05

## 2022-05-20 RX ADMIN — INSULIN DETEMIR 5 UNITS: 100 INJECTION, SOLUTION SUBCUTANEOUS at 08:05

## 2022-05-20 RX ADMIN — AMLODIPINE BESYLATE 10 MG: 5 TABLET ORAL at 09:05

## 2022-05-20 RX ADMIN — POTASSIUM CHLORIDE 40 MEQ: 750 TABLET, EXTENDED RELEASE ORAL at 09:05

## 2022-05-20 NOTE — ED PROVIDER NOTES
Encounter Date: 5/19/2022       History     Chief Complaint   Patient presents with    Shortness of Breath     SOB for about 1 week, cough with white sputum , chronic back pain      82-year-old  female with history of arthritis, cardiomyopathy, diabetes, hypertension and sleep apnea currently not using a CPAP machine who presents to the ED today with worsening shortness of breath over the last week.  She states she has had a cough with white sputum.  She states that her shortness of breath is worse with moving around.  She feels like she has some tightness in her chest.  She states that her daughter gave her an inhaler to use but has not improved her symptoms.  She denies any other complaints at this time.        Review of patient's allergies indicates:   Allergen Reactions    Codeine Nausea And Vomiting     Pt reports can take percocet    Hydrocodone-acetaminophen Nausea And Vomiting    Propoxyphene Nausea And Vomiting     Past Medical History:   Diagnosis Date    Arthritis     Cardiomyopathy     Depression     Diabetes mellitus     Hypertension     Lipids abnormal     Lumbar herniated disc     Obesity     RBBB     Sleep apnea      Past Surgical History:   Procedure Laterality Date    APPENDECTOMY      BACK SURGERY  2014,2016,2017    BREAST SURGERY Bilateral     reduction    EYE SURGERY Bilateral     HIP ARTHROPLASTY Left 11/5/2020    Procedure: ARTHROPLASTY, HIP;  Surgeon: Isaiah Mccormick MD;  Location: [facility];  Service: Orthopedics;  Laterality: Left;    JOINT REPLACEMENT Right 2006    hip    REVISION TOTAL HIP ARTHROPLASTY Left 11/30/2020    Procedure: REVISION, TOTAL ARTHROPLASTY, HIP;  Surgeon: Isaiah Mccormick MD;  Location: [facility] OR;  Service: Orthopedics;  Laterality: Left;    TOTAL HIP ARTHROPLASTY Right 2006    TOTAL KNEE ARTHROPLASTY Left 7/18/2019    Procedure: ARTHROPLASTY, KNEE, TOTAL;  Surgeon: Isaiah Mccormick MD;  Location: [facility] OR;  Service:  Orthopedics;  Laterality: Left;     History reviewed. No pertinent family history.  Social History     Tobacco Use    Smoking status: Never Smoker    Smokeless tobacco: Never Used   Substance Use Topics    Alcohol use: No     Review of Systems   Constitutional: Negative for fever.   HENT: Negative for sore throat.    Respiratory: Positive for cough, chest tightness and shortness of breath.    Cardiovascular: Negative for chest pain.   Gastrointestinal: Negative for nausea.   Genitourinary: Negative for dysuria.   Musculoskeletal: Negative for back pain.   Skin: Negative for rash.   Neurological: Negative for weakness.   Hematological: Does not bruise/bleed easily.   All other systems reviewed and are negative.      Physical Exam     Initial Vitals [05/19/22 2044]   BP Pulse Resp Temp SpO2   (!) 217/108 104 (!) 24 98.5 °F (36.9 °C) 95 %      MAP       --         Physical Exam    Nursing note and vitals reviewed.  Constitutional: She appears well-developed and well-nourished. She is not diaphoretic. No distress.   HENT:   Head: Normocephalic and atraumatic.   Mouth/Throat: Oropharynx is clear and moist. No oropharyngeal exudate.   Eyes: EOM are normal. Pupils are equal, round, and reactive to light.   Neck: Neck supple.   Normal range of motion.  Cardiovascular: Normal rate, regular rhythm, normal heart sounds and intact distal pulses. Exam reveals no gallop and no friction rub.    No murmur heard.  Pulmonary/Chest: No stridor. No respiratory distress. She has wheezes. She has no rhonchi. She has no rales. She exhibits no tenderness.   Decreased air movement and expiratory wheezing noted bilateral lung fields.   Abdominal: Abdomen is soft. Bowel sounds are normal. She exhibits no distension and no mass. There is no abdominal tenderness. There is no rebound and no guarding.   Musculoskeletal:         General: Edema present. No tenderness. Normal range of motion.      Cervical back: Normal range of motion and neck  supple.      Comments: 1-2+ pitting edema bilateral lower extremities     Neurological: She is alert and oriented to person, place, and time.   Skin: Skin is warm and dry.   Psychiatric: She has a normal mood and affect. Her behavior is normal. Thought content normal.         ED Course   Procedures  Labs Reviewed   CBC W/ AUTO DIFFERENTIAL - Abnormal; Notable for the following components:       Result Value    MCH 25.0 (*)     MCHC 29.3 (*)     RDW 14.6 (*)     All other components within normal limits   COMPREHENSIVE METABOLIC PANEL - Abnormal; Notable for the following components:    Potassium 3.1 (*)     CO2 33 (*)     Glucose 222 (*)     All other components within normal limits   TROPONIN I   NT-PRO NATRIURETIC PEPTIDE   SARS-COV-2 RNA AMPLIFICATION, QUAL    Narrative:     Is the patient symptomatic?->Yes   D DIMER, QUANTITATIVE     EKG Readings: (Independently Interpreted)   Initial Reading: No STEMI. Previous EKG: Compared with most recent EKG Previous EKG Date: 6/15/2021. Rhythm: Normal Sinus Rhythm. Heart Rate: 98. Conduction: RBBB.       Imaging Results          X-Ray Chest 1 View (Final result)  Result time 05/19/22 21:48:38    Final result by Rafita Metzger MD (05/19/22 21:48:38)                 Impression:      No acute abnormality.      Electronically signed by: Rafita Metzger  Date:    05/19/2022  Time:    21:48             Narrative:    EXAMINATION:  XR CHEST 1 VIEW    CLINICAL HISTORY:  shortness of breath;    TECHNIQUE:  Single frontal view of the chest was performed.    COMPARISON:  06/15/2021    FINDINGS:  The lungs are clear, with normal appearance of pulmonary vasculature and no pleural effusion or pneumothorax.    The cardiac silhouette is normal in size. The hilar and mediastinal contours are unremarkable.    Bones are intact.                                 Medications   cloNIDine tablet 0.2 mg (0.2 mg Oral Not Given 5/19/22 2139)   predniSONE tablet 40 mg (40 mg Oral Given 5/19/22 2136)    albuterol sulfate nebulizer solution 2.5 mg (2.5 mg Nebulization Given 5/19/22 2113)   albuterol-ipratropium 2.5 mg-0.5 mg/3 mL nebulizer solution 3 mL (3 mLs Nebulization Given 5/19/22 2217)     Medical Decision Making:   Initial Assessment:   82-year-old  female with history of arthritis, cardiomyopathy, diabetes, hypertension and sleep apnea currently not using a CPAP machine who presents to the ED today with worsening shortness of breath over the last week.   Differential Diagnosis:   Includes but not limited to COPD/asthma exacerbation versus ACS versus CHF versus hypertensive urgency/emergency versus metabolic derangement.  ED Management:  Will get cardiac workup and treat symptoms.  Will reassess.  Disposition pending results.    9:50 PM May 19, 2022  Best Florence MD      Update note:  With reassessment, patient states she feels improved with neb treatment but continued to have some shortness of breath.  Cardiac workup negative.  Will give additional nebs and reassess.    10:24 PM 5/19/2022  Best Florence MD    Update note:  Patient with negative cardiac workup.  Patient given additional nebs.  Patient continues to have some shortness of breath and desats to 93% on room air with ambulation with increased work of breathing.  Discussed admission for further management with suspected COPD/reactive airway exacerbation.  Patient agrees to admission.  Will consult for admission with Hospital Medicine.    12:10 AM 5/20/2022  Best Florence MD    Update note:  Spoke with Hospital Medicine.  Dr. Patel will be the accepting MD.  Will await EMS transfer.  Patient verbalized understanding agrees plan of care.      12:46 AM 5/20/2022  Best Florence MD        Update note:  EMS has arrived with transport and process.  Patient is stable.    5:46 AM May 20, 2022  Best Florence MD                DISCLAIMER: This note was prepared with [employer] voice recognition transcription software.  Garbled syntax, mangled pronouns, and other bizarre constructions may be attributed to that software system                         Clinical Impression:   Final diagnoses:  [R06.02] Shortness of breath          ED Disposition Condition    Observation               Ana Florence MD  05/20/22 0516

## 2022-05-20 NOTE — PLAN OF CARE
VN cued into pt's room for introduction with pt's permission.  VN role explained and informed pt that VN would be working with bedside nurse and the rest of the care team.  Fall risk and bed alarm protocol education provided.  Instructed pt to call for assistance and agreeable.  Allowed time for questions. NAD noted.  Will cont to be available as needed.      05/20/22 1040   Admission   Initial VN Admission Questions Complete   Communication Issues? None   Shift   Virtual Nurse - Patient Verbalized Approval Of Camera Use   Safety/Activity   Patient Rounds call light in patient/parent reach;visualized patient   Safety Promotion/Fall Prevention Fall Risk reviewed with patient/family;instructed to call staff for mobility

## 2022-05-20 NOTE — ED NOTES
Attempted to call report.  Nurse not available will call back in 10 mins.   
Attempted to call report.  Was left on hold.  Will attempt to try again later.   
Call made to [facility] to get an updated ETA.  Was told that ambulance was in route now.   
DJ, RT at bedside administering a breathing treatment.   
[facility] is here to transport patient.   Report given to medic.   
MD removed patient from oxygen.   
Pt ambulates to restroom with standby assist.   Pt uses a walking cane to ambulate. Gait is steady.   
Pt aox4, resting comfortably on stretcher.  Breathing is even and non-labored.  NADN.  Call light is within reach.  Siderails up x2.  Bed in lowest/locked position.  Daughter at bedside.  WCM.   
Pt becomes winded upon returning from the restroom and asks to sit on the side of the stretcher for a few minutes to catch her breath.   Daughter at bedside.   
Pt has increased work of breathing with ambulation. Desats into the 90's on RA.   
Pt resting comfortably on stretcher at this time.  Breathing is even and non-labored.  NADN.  Call light within reach.  Siderails up x2.  Bed in lowest/locked position.  Daughter at bedside.  WCM.   
Report given to BRITNEY Paige.   
Weakness

## 2022-05-20 NOTE — H&P
[facility]  History & Physical    Patient Name: Jayne You  MRN: [id number]  Patient Class: OP- Observation  Admission Date: 5/19/2022  Attending Physician: Renee Galdamez MD  Primary Care Provider: Mehrdad Cisneros MD         Patient information was obtained from patient and ER records.     Subjective:     Principal Problem:COPD exacerbation    Chief Complaint:   Chief Complaint   Patient presents with    Shortness of Breath     SOB for about 1 week, cough with white sputum , chronic back pain         HPI: Jayne You is an 83 y/o F with PMH of arthritis, cardiomyopathy, diabetes mellitus II, hypertension, depression, obesity, hyperlipidemia, and sleep apnea currently not using a CPAP machine presented to [facility] with 1 week history of worsening shortness of breath.  There is associated productive cough, postnasal drip and headache. She denies fever, chills, nausea, or vomiting. She tried inhaler without any relief.   CTA chest, CXR- Unchanged cardiomegaly with central vascular congestion which may be slightly improved from recent prior.      In the ED patient was tachypneic with noted hypertensive crisis requiring 2 LNC. Labs remarkable for K 3.1. cardiac work up negative. CXR: No acute abnormality. Given duo neb treatment x2, prednisone 40 mg with some noted improvement however patient continued to have some shortness of breath and desats to 93% on room air with ambulation with increased work of breathing. Patient will be admitted for observation to [facility] for further evaluation of SOB and treatment.      Past Medical History:   Diagnosis Date    Arthritis     Cardiomyopathy     Depression     Diabetes mellitus     Hypertension     Lipids abnormal     Lumbar herniated disc     Obesity     RBBB     Sleep apnea        Past Surgical History:   Procedure Laterality Date    APPENDECTOMY      BACK SURGERY  2014,2016,2017    BREAST SURGERY  Bilateral     reduction    EYE SURGERY Bilateral     HIP ARTHROPLASTY Left 11/5/2020    Procedure: ARTHROPLASTY, HIP;  Surgeon: Isaiah Mccormick MD;  Location: [facility];  Service: Orthopedics;  Laterality: Left;    JOINT REPLACEMENT Right 2006    hip    REVISION TOTAL HIP ARTHROPLASTY Left 11/30/2020    Procedure: REVISION, TOTAL ARTHROPLASTY, HIP;  Surgeon: Isaiah Mccormick MD;  Location: [facility];  Service: Orthopedics;  Laterality: Left;    TOTAL HIP ARTHROPLASTY Right 2006    TOTAL KNEE ARTHROPLASTY Left 7/18/2019    Procedure: ARTHROPLASTY, KNEE, TOTAL;  Surgeon: Isaiah Mccormick MD;  Location: [facility] OR;  Service: Orthopedics;  Laterality: Left;       Review of patient's allergies indicates:   Allergen Reactions    Codeine Nausea And Vomiting     Pt reports can take percocet    Hydrocodone-acetaminophen Nausea And Vomiting    Propoxyphene Nausea And Vomiting       No current facility-administered medications on file prior to encounter.     Current Outpatient Medications on File Prior to Encounter   Medication Sig    acetaminophen (TYLENOL) 650 MG TbSR Tylenol arthritis 650 mg by mouth twice daily as needed for pain.    amLODIPine (NORVASC) 10 MG tablet Take 10 mg by mouth once daily.    amoxicillin (AMOXIL) 500 MG capsule amoxicillin 500 mg capsule   TAKE 1 CAPSULE BY MOUTH TWICE DAILY    aspirin 81 MG Chew Take 1 tablet (81 mg total) by mouth once daily.    baclofen (LIORESAL) 10 MG tablet Take 1 tablet (10 mg total) by mouth every evening.    benzonatate (TESSALON) 100 MG capsule Take 100 mg by mouth 3 (three) times daily as needed for Cough.    diclofenac sodium (VOLTAREN) 1 % Gel Apply 2 g topically 2 (two) times daily. To left shoulder    doxycycline (MONODOX) 100 MG capsule doxycycline monohydrate 100 mg capsule    doxycycline (VIBRAMYCIN) 100 MG Cap doxycycline hyclate 100 mg capsule   TAKE 1 CAPSULE BY MOUTH TWICE DAILY    DULoxetine (CYMBALTA) 30 MG capsule Take 30 mg by mouth  once daily.    ergocalciferol (ERGOCALCIFEROL) 50,000 unit Cap ergocalciferol (vitamin D2) 1,250 mcg (50,000 unit) capsule   TAKE 1 CAPSULE BY MOUTH 1 TIME A WEEK    furosemide (LASIX) 20 MG tablet Take 20 mg by mouth once daily.    gabapentin (NEURONTIN) 300 MG capsule gabapentin 300 mg capsule    glipiZIDE (GLUCOTROL) 5 MG tablet glipizide 5 mg tablet    hydroCHLOROthiazide (HYDRODIURIL) 12.5 MG Tab Take 12.5 mg by mouth once daily.    HYDROcodone-acetaminophen (NORCO)  mg per tablet hydrocodone 10 mg-acetaminophen 325 mg tablet   TAKE 1 TABLET BY MOUTH EVERY 6 TO 8 HOURS AS NEEDED FOR PAIN    ibuprofen (ADVIL,MOTRIN) 800 MG tablet ibuprofen 800 mg tablet    levocetirizine (XYZAL) 5 MG tablet levocetirizine 5 mg tablet    lisinopril (PRINIVIL,ZESTRIL) 40 MG tablet Take 40 mg by mouth once daily.    losartan (COZAAR) 100 MG tablet losartan 100 mg tablet    melatonin (MELATIN) 3 mg tablet Take 2 tablets (6 mg total) by mouth nightly as needed for Insomnia.    minocycline (MINOCIN,DYNACIN) 100 MG capsule minocycline 100 mg capsule   TAKE 1 CAPSULE BY MOUTH TWICE DAILY    neomycin-polymyxin-hydrocortisone (CORTISPORIN) 3.5-10,000-1 mg/mL-unit/mL-% otic suspension neomycin-polymyxin-hydrocort 3.5 mg-10,000 unit/mL-1 % ear drops,susp   SHAKE LIQUID AND INSTILL 1 DROP TO AFFECTED EAR FOUR TIMES DAILY FOR 10 DAYS    omeprazole (PRILOSEC) 20 MG capsule Take 20 mg by mouth once daily.    prednisoLONE acetate (PRED FORTE) 1 % DrpS 1 drop 4 (four) times daily.    pregabalin (LYRICA) 100 MG capsule pregabalin 100 mg capsule   TAKE ONE CAPSULE BY MOUTH TWICE DAILY    rosuvastatin (CRESTOR) 5 MG tablet rosuvastatin 5 mg tablet    sulindac (CLINORIL) 150 MG tablet sulindac 150 mg tablet   TAKE 1 TABLET BY MOUTH TWICE DAILY    zolpidem (AMBIEN) 10 mg Tab zolpidem 10 mg tablet   TAKE 1 TABLET BY MOUTH EVERY DAY AT BEDTIME AS NEEDED     Family History    None       Tobacco Use    Smoking status: Never  Smoker    Smokeless tobacco: Never Used   Substance and Sexual Activity    Alcohol use: No    Drug use: Not on file    Sexual activity: Not on file     Review of Systems   Constitutional:  Negative for chills, fatigue and fever.   HENT:  Positive for postnasal drip. Negative for congestion, rhinorrhea and tinnitus.    Eyes:  Negative for photophobia, pain, discharge, redness, itching and visual disturbance.   Respiratory:  Positive for cough, shortness of breath and wheezing. Negative for chest tightness.    Gastrointestinal:  Negative for abdominal pain, nausea and vomiting.   Endocrine: Negative for polyphagia and polyuria.   Genitourinary:  Negative for dysuria and pelvic pain.   Musculoskeletal:  Negative for back pain.   Skin:  Positive for color change and rash.   Neurological:  Positive for headaches.   Psychiatric/Behavioral:  Negative for agitation.    Objective:     Vital Signs (Most Recent):  Temp: 98.1 °F (36.7 °C) (05/20/22 0636)  Pulse: 102 (05/20/22 0636)  Resp: (!) 21 (05/20/22 0636)  BP: 127/71 (05/20/22 0636)  SpO2: 96 % (05/20/22 0636)   Vital Signs (24h Range):  Temp:  [98.1 °F (36.7 °C)-98.5 °F (36.9 °C)] 98.1 °F (36.7 °C)  Pulse:  [] 102  Resp:  [21-25] 21  SpO2:  [92 %-99 %] 96 %  BP: (127-217)/() 127/71     Weight: 122.4 kg (269 lb 13.5 oz)  Body mass index is 46.32 kg/m².    Physical Exam  Constitutional:       Appearance: She is well-developed. She is obese.   HENT:      Head: Normocephalic and atraumatic.      Right Ear: There is no impacted cerumen.      Left Ear: There is no impacted cerumen.      Nose: No congestion or rhinorrhea.      Mouth/Throat:      Pharynx: No oropharyngeal exudate or posterior oropharyngeal erythema.   Eyes:      Pupils: Pupils are equal, round, and reactive to light.   Cardiovascular:      Rate and Rhythm: Normal rate and regular rhythm.      Heart sounds: Normal heart sounds. No murmur heard.    No friction rub. No gallop.   Pulmonary:       Effort: Pulmonary effort is normal. Tachypnea present.      Breath sounds: Wheezing present.   Abdominal:      General: Bowel sounds are normal. There is no distension.      Palpations: Abdomen is soft.      Tenderness: There is no abdominal tenderness.      Comments: obese   Musculoskeletal:      Cervical back: Neck supple.      Right lower leg: No edema.      Left lower leg: No edema.   Skin:     General: Skin is warm.      Capillary Refill: Capillary refill takes less than 2 seconds.   Neurological:      General: No focal deficit present.      Mental Status: She is alert and oriented to person, place, and time.   Psychiatric:         Speech: Speech normal.         Behavior: Behavior normal.         Thought Content: Thought content normal.         CRANIAL NERVES     CN III, IV, VI   Pupils are equal, round, and reactive to light.     Significant Labs: A1C: No results for input(s): HGBA1C in the last 4320 hours.  Blood Culture: No results for input(s): LABBLOO in the last 48 hours.  CBC:   Recent Labs   Lab 05/19/22 2108   WBC 6.97   HGB 12.9   HCT 44.0        CMP:   Recent Labs   Lab 05/19/22 2108      K 3.1*      CO2 33*   *   BUN 17   CREATININE 0.66   CALCIUM 9.2   PROT 6.9   ALBUMIN 3.9   BILITOT 0.3   ALKPHOS 102   AST 34   ALT 35   ANIONGAP 12   EGFRNONAA >60.0     Lactic Acid: No results for input(s): LACTATE in the last 48 hours.  Lipase: No results for input(s): LIPASE in the last 48 hours.  Lipid Panel: No results for input(s): CHOL, HDL, LDLCALC, TRIG, CHOLHDL in the last 48 hours.  Magnesium: No results for input(s): MG in the last 48 hours.  Troponin:   Recent Labs   Lab 05/19/22 2108   TROPONINI 0.027     TSH: No results for input(s): TSH in the last 4320 hours.  Urine Culture: No results for input(s): LABURIN in the last 48 hours.  Urine Studies: No results for input(s): COLORU, APPEARANCEUA, PHUR, SPECGRAV, PROTEINUA, GLUCUA, KETONESU, BILIRUBINUA, OCCULTUA, NITRITE,  UROBILINOGEN, LEUKOCYTESUR, RBCUA, WBCUA, BACTERIA, SQUAMEPITHEL, HYALINECASTS in the last 48 hours.    Invalid input(s): KIMBRELIR    Significant Imaging: I have reviewed all pertinent imaging results/findings within the past 24 hours.    Assessment/Plan:     * COPD exacerbation  Continue Prednisone  Continue neb  Supplemental oxygen -on 2 L NC   Flonase    Hypokalemia  replace      Sleep apnea  Denies use of home CPAP  -CPAP qHS      Abnormality of gait  Fall precautions      Morbid obesity    -encourage lifestyle modifications (healthy diet, exercise)         Hyperlipidemia  Resume home statin      Type 2 diabetes mellitus without complication, without long-term current use of insulin  Hemoglobin A1C   Date Value Ref Range Status   06/16/2021 6.6 (H) 4.0 - 5.6 % Final   -A1c pending  -hold home meds while inpatient  -SSI  -Accucheck ACHS        Essential hypertension  Hypertensive crisis noted in ED  BP improved without BP medication intervention  Takes amlodipine, lisinopril at home  Resume and monitor        VTE Risk Mitigation (From admission, onward)         Ordered     enoxaparin injection 40 mg  Daily         05/20/22 1447     IP VTE HIGH RISK PATIENT  Once         05/20/22 0713     Place sequential compression device  Until discontinued         05/20/22 0713                   Renee Galdamez MD  Department of Hospital Medicine   [facility]

## 2022-05-20 NOTE — SUBJECTIVE & OBJECTIVE
Past Medical History:   Diagnosis Date    Arthritis     Cardiomyopathy     Depression     Diabetes mellitus     Hypertension     Lipids abnormal     Lumbar herniated disc     Obesity     RBBB     Sleep apnea        Past Surgical History:   Procedure Laterality Date    APPENDECTOMY      BACK SURGERY  2014,2016,2017    BREAST SURGERY Bilateral     reduction    EYE SURGERY Bilateral     HIP ARTHROPLASTY Left 11/5/2020    Procedure: ARTHROPLASTY, HIP;  Surgeon: Isaiah Mccormick MD;  Location: [facility];  Service: Orthopedics;  Laterality: Left;    JOINT REPLACEMENT Right 2006    hip    REVISION TOTAL HIP ARTHROPLASTY Left 11/30/2020    Procedure: REVISION, TOTAL ARTHROPLASTY, HIP;  Surgeon: Isaiah Mccormick MD;  Location: [facility];  Service: Orthopedics;  Laterality: Left;    TOTAL HIP ARTHROPLASTY Right 2006    TOTAL KNEE ARTHROPLASTY Left 7/18/2019    Procedure: ARTHROPLASTY, KNEE, TOTAL;  Surgeon: Isaiah Mccormick MD;  Location: [facility];  Service: Orthopedics;  Laterality: Left;       Review of patient's allergies indicates:   Allergen Reactions    Codeine Nausea And Vomiting     Pt reports can take percocet    Hydrocodone-acetaminophen Nausea And Vomiting    Propoxyphene Nausea And Vomiting       No current facility-administered medications on file prior to encounter.     Current Outpatient Medications on File Prior to Encounter   Medication Sig    acetaminophen (TYLENOL) 650 MG TbSR Tylenol arthritis 650 mg by mouth twice daily as needed for pain.    amLODIPine (NORVASC) 10 MG tablet Take 10 mg by mouth once daily.    amoxicillin (AMOXIL) 500 MG capsule amoxicillin 500 mg capsule   TAKE 1 CAPSULE BY MOUTH TWICE DAILY    aspirin 81 MG Chew Take 1 tablet (81 mg total) by mouth once daily.    baclofen (LIORESAL) 10 MG tablet Take 1 tablet (10 mg total) by mouth every evening.    benzonatate (TESSALON) 100 MG capsule Take 100 mg by mouth 3 (three) times daily as needed for Cough.    diclofenac sodium (VOLTAREN) 1 %  Gel Apply 2 g topically 2 (two) times daily. To left shoulder    doxycycline (MONODOX) 100 MG capsule doxycycline monohydrate 100 mg capsule    doxycycline (VIBRAMYCIN) 100 MG Cap doxycycline hyclate 100 mg capsule   TAKE 1 CAPSULE BY MOUTH TWICE DAILY    DULoxetine (CYMBALTA) 30 MG capsule Take 30 mg by mouth once daily.    ergocalciferol (ERGOCALCIFEROL) 50,000 unit Cap ergocalciferol (vitamin D2) 1,250 mcg (50,000 unit) capsule   TAKE 1 CAPSULE BY MOUTH 1 TIME A WEEK    furosemide (LASIX) 20 MG tablet Take 20 mg by mouth once daily.    gabapentin (NEURONTIN) 300 MG capsule gabapentin 300 mg capsule    glipiZIDE (GLUCOTROL) 5 MG tablet glipizide 5 mg tablet    hydroCHLOROthiazide (HYDRODIURIL) 12.5 MG Tab Take 12.5 mg by mouth once daily.    HYDROcodone-acetaminophen (NORCO)  mg per tablet hydrocodone 10 mg-acetaminophen 325 mg tablet   TAKE 1 TABLET BY MOUTH EVERY 6 TO 8 HOURS AS NEEDED FOR PAIN    ibuprofen (ADVIL,MOTRIN) 800 MG tablet ibuprofen 800 mg tablet    levocetirizine (XYZAL) 5 MG tablet levocetirizine 5 mg tablet    lisinopril (PRINIVIL,ZESTRIL) 40 MG tablet Take 40 mg by mouth once daily.    losartan (COZAAR) 100 MG tablet losartan 100 mg tablet    melatonin (MELATIN) 3 mg tablet Take 2 tablets (6 mg total) by mouth nightly as needed for Insomnia.    minocycline (MINOCIN,DYNACIN) 100 MG capsule minocycline 100 mg capsule   TAKE 1 CAPSULE BY MOUTH TWICE DAILY    neomycin-polymyxin-hydrocortisone (CORTISPORIN) 3.5-10,000-1 mg/mL-unit/mL-% otic suspension neomycin-polymyxin-hydrocort 3.5 mg-10,000 unit/mL-1 % ear drops,susp   SHAKE LIQUID AND INSTILL 1 DROP TO AFFECTED EAR FOUR TIMES DAILY FOR 10 DAYS    omeprazole (PRILOSEC) 20 MG capsule Take 20 mg by mouth once daily.    prednisoLONE acetate (PRED FORTE) 1 % DrpS 1 drop 4 (four) times daily.    pregabalin (LYRICA) 100 MG capsule pregabalin 100 mg capsule   TAKE ONE CAPSULE BY MOUTH TWICE DAILY    rosuvastatin (CRESTOR) 5 MG tablet rosuvastatin 5  mg tablet    sulindac (CLINORIL) 150 MG tablet sulindac 150 mg tablet   TAKE 1 TABLET BY MOUTH TWICE DAILY    zolpidem (AMBIEN) 10 mg Tab zolpidem 10 mg tablet   TAKE 1 TABLET BY MOUTH EVERY DAY AT BEDTIME AS NEEDED     Family History    None       Tobacco Use    Smoking status: Never Smoker    Smokeless tobacco: Never Used   Substance and Sexual Activity    Alcohol use: No    Drug use: Not on file    Sexual activity: Not on file     Review of Systems   Constitutional:  Negative for chills, fatigue and fever.   HENT:  Positive for postnasal drip. Negative for congestion, rhinorrhea and tinnitus.    Eyes:  Negative for photophobia, pain, discharge, redness, itching and visual disturbance.   Respiratory:  Positive for cough, shortness of breath and wheezing. Negative for chest tightness.    Gastrointestinal:  Negative for abdominal pain, nausea and vomiting.   Endocrine: Negative for polyphagia and polyuria.   Genitourinary:  Negative for dysuria and pelvic pain.   Musculoskeletal:  Negative for back pain.   Skin:  Positive for color change and rash.   Neurological:  Positive for headaches.   Psychiatric/Behavioral:  Negative for agitation.    Objective:     Vital Signs (Most Recent):  Temp: 98.1 °F (36.7 °C) (05/20/22 0636)  Pulse: 102 (05/20/22 0636)  Resp: (!) 21 (05/20/22 0636)  BP: 127/71 (05/20/22 0636)  SpO2: 96 % (05/20/22 0636)   Vital Signs (24h Range):  Temp:  [98.1 °F (36.7 °C)-98.5 °F (36.9 °C)] 98.1 °F (36.7 °C)  Pulse:  [] 102  Resp:  [21-25] 21  SpO2:  [92 %-99 %] 96 %  BP: (127-217)/() 127/71     Weight: 122.4 kg (269 lb 13.5 oz)  Body mass index is 46.32 kg/m².    Physical Exam  Constitutional:       Appearance: She is well-developed. She is obese.   HENT:      Head: Normocephalic and atraumatic.      Right Ear: There is no impacted cerumen.      Left Ear: There is no impacted cerumen.      Nose: No congestion or rhinorrhea.      Mouth/Throat:      Pharynx: No oropharyngeal exudate or  posterior oropharyngeal erythema.   Eyes:      Pupils: Pupils are equal, round, and reactive to light.   Cardiovascular:      Rate and Rhythm: Normal rate and regular rhythm.      Heart sounds: Normal heart sounds. No murmur heard.    No friction rub. No gallop.   Pulmonary:      Effort: Pulmonary effort is normal. Tachypnea present.      Breath sounds: Wheezing present.   Abdominal:      General: Bowel sounds are normal. There is no distension.      Palpations: Abdomen is soft.      Tenderness: There is no abdominal tenderness.      Comments: obese   Musculoskeletal:      Cervical back: Neck supple.      Right lower leg: No edema.      Left lower leg: No edema.   Skin:     General: Skin is warm.      Capillary Refill: Capillary refill takes less than 2 seconds.   Neurological:      General: No focal deficit present.      Mental Status: She is alert and oriented to person, place, and time.   Psychiatric:         Speech: Speech normal.         Behavior: Behavior normal.         Thought Content: Thought content normal.         CRANIAL NERVES     CN III, IV, VI   Pupils are equal, round, and reactive to light.     Significant Labs: A1C: No results for input(s): HGBA1C in the last 4320 hours.  Blood Culture: No results for input(s): LABBLOO in the last 48 hours.  CBC:   Recent Labs   Lab 05/19/22 2108   WBC 6.97   HGB 12.9   HCT 44.0        CMP:   Recent Labs   Lab 05/19/22 2108      K 3.1*      CO2 33*   *   BUN 17   CREATININE 0.66   CALCIUM 9.2   PROT 6.9   ALBUMIN 3.9   BILITOT 0.3   ALKPHOS 102   AST 34   ALT 35   ANIONGAP 12   EGFRNONAA >60.0     Lactic Acid: No results for input(s): LACTATE in the last 48 hours.  Lipase: No results for input(s): LIPASE in the last 48 hours.  Lipid Panel: No results for input(s): CHOL, HDL, LDLCALC, TRIG, CHOLHDL in the last 48 hours.  Magnesium: No results for input(s): MG in the last 48 hours.  Troponin:   Recent Labs   Lab 05/19/22 2108   TROPONINI  0.027     TSH: No results for input(s): TSH in the last 4320 hours.  Urine Culture: No results for input(s): LABURIN in the last 48 hours.  Urine Studies: No results for input(s): COLORU, APPEARANCEUA, PHUR, SPECGRAV, PROTEINUA, GLUCUA, KETONESU, BILIRUBINUA, OCCULTUA, NITRITE, UROBILINOGEN, LEUKOCYTESUR, RBCUA, WBCUA, BACTERIA, SQUAMEPITHEL, HYALINECASTS in the last 48 hours.    Invalid input(s): ASHLYN    Significant Imaging: I have reviewed all pertinent imaging results/findings within the past 24 hours.

## 2022-05-20 NOTE — PLAN OF CARE
CM met with pt - lives alone but daughter Shawne Phoenix  [phone]  lives nearby and will be able to  pt at d/c.   pt has had [facility] in the past - not currently      pt reports she has a rollator and a built in shower seat - wants a BSC - request sent to MD       pt gets household help for light cleaning every Wed. per [facility]      Ms. You is on the [email or web address] wait list for CPAP  --- she just checked with the company last week.       CM obtained f/u apt with pcp for Mon. 5/23 at 7:50 am  Dr. Cristobal -- [facility]  booked per [facility].    No home O2.         05/20/22 1446   Discharge Planning   Assessment Type Discharge Planning Brief Assessment   Resource/Environmental Concerns none   Support Systems Children   Equipment Currently Used at Home rollator   Current Living Arrangements home/apartment/condo   Patient/Family Anticipates Transition to home   Patient/Family Anticipated Services at Transition none   DME Needed Upon Discharge  bedside commode   Discharge Plan A Home;Home with family

## 2022-05-20 NOTE — HPI
Jayne You is an 81 y/o F with PMH of arthritis, cardiomyopathy, diabetes mellitus II, hypertension, depression, obesity, hyperlipidemia, and sleep apnea currently not using a CPAP machine presented to [facility] with 1 week history of worsening shortness of breath.  There is associated productive cough, postnasal drip and headache. She denies fever, chills, nausea, or vomiting. She tried inhaler without any relief.   CTA chest, CXR- Unchanged cardiomegaly with central vascular congestion which may be slightly improved from recent prior.      In the ED patient was tachypneic with noted hypertensive crisis requiring 2 LNC. Labs remarkable for K 3.1. cardiac work up negative. CXR: No acute abnormality. Given duo neb treatment x2, prednisone 40 mg with some noted improvement however patient continued to have some shortness of breath and desats to 93% on room air with ambulation with increased work of breathing. Patient will be admitted for observation to [facility] for further evaluation of SOB and treatment.

## 2022-05-20 NOTE — ASSESSMENT & PLAN NOTE
Hypertensive crisis noted in ED  BP improved without BP medication intervention  Takes amlodipine, lisinopril at home  Resume and monitor

## 2022-05-20 NOTE — ASSESSMENT & PLAN NOTE
Hemoglobin A1C   Date Value Ref Range Status   06/16/2021 6.6 (H) 4.0 - 5.6 % Final   -A1c pending  -hold home meds while inpatient  -[facility]  -Saul SIERRA

## 2022-05-20 NOTE — PLAN OF CARE
The proper method of use, as well as anticipated side effects, of this aerosol treatment are discussed and demonstrated to the patient. Patient on oxygen with documented flow.  Will attempt to wean per O2 order protocol. Will continue to monitor.     Vaccine Information Statement(s) was given today. This has been reviewed, questions answered, and verbal consent given by Parent for injection(s) and administration of Influenza (Inactivated).    1. Does the patient have a moderate to severe fever?  No  2. Has the patient had a serious reaction to a flu shot before?   No  3. Has the patient ever had Guillian Latonia Syndrome within 6 weeks of a previous flu shot?  No  4. Is the patient less that 6 months of age?  No    Patient is eligible to receive the vaccine based on all questions being answered as 'No'.    Patient tolerated without incident. See immunization grid for documentation.

## 2022-05-21 VITALS
HEART RATE: 104 BPM | DIASTOLIC BLOOD PRESSURE: 70 MMHG | WEIGHT: 266.75 LBS | RESPIRATION RATE: 24 BRPM | SYSTOLIC BLOOD PRESSURE: 152 MMHG | HEIGHT: 64 IN | OXYGEN SATURATION: 94 % | TEMPERATURE: 98 F | BODY MASS INDEX: 45.54 KG/M2

## 2022-05-21 LAB
ANION GAP SERPL CALC-SCNC: 12 MMOL/L (ref 8–16)
ANISOCYTOSIS BLD QL SMEAR: SLIGHT
BASOPHILS # BLD AUTO: 0.02 K/UL (ref 0–0.2)
BASOPHILS NFR BLD: 0.2 % (ref 0–1.9)
BUN SERPL-MCNC: 15 MG/DL (ref 8–23)
CALCIUM SERPL-MCNC: 9.3 MG/DL (ref 8.7–10.5)
CHLORIDE SERPL-SCNC: 101 MMOL/L (ref 95–110)
CO2 SERPL-SCNC: 30 MMOL/L (ref 23–29)
CREAT SERPL-MCNC: 0.8 MG/DL (ref 0.5–1.4)
DACRYOCYTES BLD QL SMEAR: ABNORMAL
DIFFERENTIAL METHOD: ABNORMAL
EOSINOPHIL # BLD AUTO: 0 K/UL (ref 0–0.5)
EOSINOPHIL NFR BLD: 0.2 % (ref 0–8)
ERYTHROCYTE [DISTWIDTH] IN BLOOD BY AUTOMATED COUNT: 14.8 % (ref 11.5–14.5)
EST. GFR  (AFRICAN AMERICAN): >60 ML/MIN/1.73 M^2
EST. GFR  (NON AFRICAN AMERICAN): >60 ML/MIN/1.73 M^2
ESTIMATED AVG GLUCOSE: 217 MG/DL (ref 68–131)
GLUCOSE SERPL-MCNC: 244 MG/DL (ref 70–110)
HBA1C MFR BLD: 9.2 % (ref 4–5.6)
HCT VFR BLD AUTO: 43.7 % (ref 37–48.5)
HGB BLD-MCNC: 13 G/DL (ref 12–16)
HYPOCHROMIA BLD QL SMEAR: ABNORMAL
IMM GRANULOCYTES # BLD AUTO: 0.03 K/UL (ref 0–0.04)
IMM GRANULOCYTES NFR BLD AUTO: 0.3 % (ref 0–0.5)
LYMPHOCYTES # BLD AUTO: 2 K/UL (ref 1–4.8)
LYMPHOCYTES NFR BLD: 19 % (ref 18–48)
MAGNESIUM SERPL-MCNC: 1.9 MG/DL (ref 1.6–2.6)
MCH RBC QN AUTO: 25 PG (ref 27–31)
MCHC RBC AUTO-ENTMCNC: 29.7 G/DL (ref 32–36)
MCV RBC AUTO: 84 FL (ref 82–98)
MONOCYTES # BLD AUTO: 0.8 K/UL (ref 0.3–1)
MONOCYTES NFR BLD: 7.1 % (ref 4–15)
NEUTROPHILS # BLD AUTO: 7.8 K/UL (ref 1.8–7.7)
NEUTROPHILS NFR BLD: 73.2 % (ref 38–73)
NRBC BLD-RTO: 0 /100 WBC
PHOSPHATE SERPL-MCNC: 3.1 MG/DL (ref 2.7–4.5)
PLATELET # BLD AUTO: 148 K/UL (ref 150–450)
PLATELET BLD QL SMEAR: ABNORMAL
PMV BLD AUTO: 13 FL (ref 9.2–12.9)
POCT GLUCOSE: 241 MG/DL (ref 70–110)
POIKILOCYTOSIS BLD QL SMEAR: SLIGHT
POLYCHROMASIA BLD QL SMEAR: ABNORMAL
POTASSIUM SERPL-SCNC: 3.7 MMOL/L (ref 3.5–5.1)
RBC # BLD AUTO: 5.21 M/UL (ref 4–5.4)
SODIUM SERPL-SCNC: 143 MMOL/L (ref 136–145)
WBC # BLD AUTO: 10.7 K/UL (ref 3.9–12.7)

## 2022-05-21 PROCEDURE — 36415 COLL VENOUS BLD VENIPUNCTURE: CPT | Performed by: NURSE PRACTITIONER

## 2022-05-21 PROCEDURE — 99900035 HC TECH TIME PER 15 MIN (STAT)

## 2022-05-21 PROCEDURE — 83735 ASSAY OF MAGNESIUM: CPT | Performed by: NURSE PRACTITIONER

## 2022-05-21 PROCEDURE — 96372 THER/PROPH/DIAG INJ SC/IM: CPT | Performed by: NURSE PRACTITIONER

## 2022-05-21 PROCEDURE — 80048 BASIC METABOLIC PNL TOTAL CA: CPT | Performed by: NURSE PRACTITIONER

## 2022-05-21 PROCEDURE — 94640 AIRWAY INHALATION TREATMENT: CPT | Mod: XB

## 2022-05-21 PROCEDURE — 63600175 PHARM REV CODE 636 W HCPCS: Performed by: NURSE PRACTITIONER

## 2022-05-21 PROCEDURE — 94761 N-INVAS EAR/PLS OXIMETRY MLT: CPT

## 2022-05-21 PROCEDURE — 25000242 PHARM REV CODE 250 ALT 637 W/ HCPCS: Performed by: FAMILY MEDICINE

## 2022-05-21 PROCEDURE — G0378 HOSPITAL OBSERVATION PER HR: HCPCS

## 2022-05-21 PROCEDURE — 25000242 PHARM REV CODE 250 ALT 637 W/ HCPCS: Performed by: NURSE PRACTITIONER

## 2022-05-21 PROCEDURE — 83036 HEMOGLOBIN GLYCOSYLATED A1C: CPT | Performed by: NURSE PRACTITIONER

## 2022-05-21 PROCEDURE — 25000003 PHARM REV CODE 250: Performed by: FAMILY MEDICINE

## 2022-05-21 PROCEDURE — 85025 COMPLETE CBC W/AUTO DIFF WBC: CPT | Performed by: NURSE PRACTITIONER

## 2022-05-21 PROCEDURE — 84100 ASSAY OF PHOSPHORUS: CPT | Performed by: NURSE PRACTITIONER

## 2022-05-21 PROCEDURE — 94660 CPAP INITIATION&MGMT: CPT

## 2022-05-21 RX ORDER — PREDNISONE 20 MG/1
40 TABLET ORAL DAILY
Qty: 8 TABLET | Refills: 0 | Status: SHIPPED | OUTPATIENT
Start: 2022-05-21 | End: 2022-05-25

## 2022-05-21 RX ADMIN — GABAPENTIN 300 MG: 300 CAPSULE ORAL at 08:05

## 2022-05-21 RX ADMIN — IPRATROPIUM BROMIDE AND ALBUTEROL SULFATE 3 ML: 2.5; .5 SOLUTION RESPIRATORY (INHALATION) at 03:05

## 2022-05-21 RX ADMIN — LOSARTAN POTASSIUM 50 MG: 50 TABLET, FILM COATED ORAL at 08:05

## 2022-05-21 RX ADMIN — DULOXETINE 30 MG: 30 CAPSULE, DELAYED RELEASE ORAL at 08:05

## 2022-05-21 RX ADMIN — INSULIN ASPART 2 UNITS: 100 INJECTION, SOLUTION INTRAVENOUS; SUBCUTANEOUS at 08:05

## 2022-05-21 RX ADMIN — FLUTICASONE PROPIONATE 100 MCG: 50 SPRAY, METERED NASAL at 08:05

## 2022-05-21 RX ADMIN — IPRATROPIUM BROMIDE AND ALBUTEROL SULFATE 3 ML: 2.5; .5 SOLUTION RESPIRATORY (INHALATION) at 08:05

## 2022-05-21 RX ADMIN — FUROSEMIDE 20 MG: 20 TABLET ORAL at 08:05

## 2022-05-21 RX ADMIN — ASPIRIN 81 MG CHEWABLE TABLET 81 MG: 81 TABLET CHEWABLE at 08:05

## 2022-05-21 RX ADMIN — AMLODIPINE BESYLATE 10 MG: 5 TABLET ORAL at 08:05

## 2022-05-21 RX ADMIN — PREDNISONE 40 MG: 20 TABLET ORAL at 08:05

## 2022-05-21 NOTE — DISCHARGE SUMMARY
[facility]  Discharge Summary      Patient Name: Jayne You  MRN: [id number]  Patient Class: OP- Observation  Admission Date: 5/19/2022  Hospital Length of Stay: 0 days  Discharge Date and Time: 5/21/2022 11:35 AM  Attending Physician: No att. providers found   Discharging Provider: Renee Galdamez MD  Primary Care Provider: Mehrdad Cisneros MD      HPI:   Jayne You is an 83 y/o F with PMH of arthritis, cardiomyopathy, diabetes mellitus II, hypertension, depression, obesity, hyperlipidemia, and sleep apnea currently not using a CPAP machine presented to [facility] with 1 week history of worsening shortness of breath.  There is associated productive cough, postnasal drip and headache. She denies fever, chills, nausea, or vomiting. She tried inhaler without any relief.   CTA chest, CXR- Unchanged cardiomegaly with central vascular congestion which may be slightly improved from recent prior.      In the ED patient was tachypneic with noted hypertensive crisis requiring 2 LNC. Labs remarkable for K 3.1. cardiac work up negative. CXR: No acute abnormality. Given duo neb treatment x2, prednisone 40 mg with some noted improvement however patient continued to have some shortness of breath and desats to 93% on room air with ambulation with increased work of breathing. Patient will be admitted for observation to [facility] for further evaluation of SOB and treatment.      * No surgery found *      Hospital Course:   No notes on file     Goals of Care Treatment Preferences:  Code Status: Full Code      Consults:     * COPD exacerbation  Continue Prednisone  Continue neb  Supplemental oxygen -on 2 L NC   Flonase    Hypokalemia  replace      Sleep apnea  Denies use of home CPAP  -CPAP qHS      Abnormality of gait  Fall precautions      Morbid obesity    -encourage lifestyle modifications (healthy diet, exercise)         Hyperlipidemia  Resume home statin      Type 2  "diabetes mellitus without complication, without long-term current use of insulin  Hemoglobin A1C   Date Value Ref Range Status   06/16/2021 6.6 (H) 4.0 - 5.6 % Final   -A1c pending  -hold home meds while inpatient  -[facility]  -Accucheck ACHS        Essential hypertension  Hypertensive crisis noted in ED  BP improved without BP medication intervention  Takes amlodipine, lisinopril at home  Resume and monitor        Final Active Diagnoses:    Diagnosis Date Noted POA    PRINCIPAL PROBLEM:  COPD exacerbation [J44.1] 05/20/2022 Yes    Sleep apnea [G47.30] 05/20/2022 Yes    Hypokalemia [E87.6] 05/20/2022 Yes    Abnormality of gait [R26.9] 09/27/2021 Yes    Essential hypertension [I10] 06/16/2021 Yes    Type 2 diabetes mellitus without complication, without long-term current use of insulin [E11.9] 06/16/2021 Yes    Morbid obesity [E66.01] 06/16/2021 Yes    Hyperlipidemia [E78.5] 06/16/2021 Yes      Problems Resolved During this Admission:       Discharged Condition: stable    Disposition: Home or Self Care    Follow Up:   Follow-up Information     Mehrdad Cisneros MD Follow up on 5/23/2022.    Specialty: Internal Medicine  Why: 7:50 AM    --  Contact information:  [address] [place] [address]  [address]  [facility] [id number] [phone]             [facility] Follow up.    Specialty: Home Health Services  Contact information:  [address]  [address]  [place] [id number] [phone]                       Patient Instructions:      COMMODE FOR HOME USE     Order Specific Question Answer Comments   Type: Standard    Height: 5' 4" (1.626 m)    Weight: 122.4 kg (269 lb 13.5 oz)    Length of need (1-99 months): 99      Diet Cardiac     Diet diabetic     Activity as tolerated       [id number]}    Pending Diagnostic Studies:     None         Medications:  Reconciled Home Medications:      Medication List      START taking these medications    predniSONE 20 MG tablet  Commonly known as: DELTASONE  Take 2 tablets " (40 mg total) by mouth once daily. for 4 days        CONTINUE taking these medications    acetaminophen 650 MG Tbsr  Commonly known as: TYLENOL  Tylenol arthritis 650 mg by mouth twice daily as needed for pain.     amLODIPine 10 MG tablet  Commonly known as: NORVASC  Take 10 mg by mouth once daily.     aspirin 81 MG Chew  Take 1 tablet (81 mg total) by mouth once daily.     baclofen 10 MG tablet  Commonly known as: LIORESAL  Take 1 tablet (10 mg total) by mouth every evening.     benzonatate 100 MG capsule  Commonly known as: TESSALON  Take 100 mg by mouth 3 (three) times daily as needed for Cough.     diclofenac sodium 1 % Gel  Commonly known as: VOLTAREN  Apply 2 g topically 2 (two) times daily. To left shoulder     DULoxetine 30 MG capsule  Commonly known as: CYMBALTA  Take 30 mg by mouth once daily.     ergocalciferol 50,000 unit Cap  Commonly known as: ERGOCALCIFEROL  ergocalciferol (vitamin D2) 1,250 mcg (50,000 unit) capsule   TAKE 1 CAPSULE BY MOUTH 1 TIME A WEEK     furosemide 20 MG tablet  Commonly known as: LASIX  Take 20 mg by mouth once daily.     gabapentin 300 MG capsule  Commonly known as: NEURONTIN  gabapentin 300 mg capsule     glipiZIDE 5 MG tablet  Commonly known as: GLUCOTROL  glipizide 5 mg tablet     hydroCHLOROthiazide 12.5 MG Tab  Commonly known as: HYDRODIURIL  Take 12.5 mg by mouth once daily.     HYDROcodone-acetaminophen  mg per tablet  Commonly known as: NORCO  hydrocodone 10 mg-acetaminophen 325 mg tablet   TAKE 1 TABLET BY MOUTH EVERY 6 TO 8 HOURS AS NEEDED FOR PAIN     levocetirizine 5 MG tablet  Commonly known as: XYZAL  levocetirizine 5 mg tablet     losartan 100 MG tablet  Commonly known as: COZAAR  losartan 100 mg tablet     melatonin 3 mg tablet  Commonly known as: MELATIN  Take 2 tablets (6 mg total) by mouth nightly as needed for Insomnia.     neomycin-polymyxin-hydrocortisone 3.5-10,000-1 mg/mL-unit/mL-% otic suspension  Commonly known as:  CORTISPORIN  neomycin-polymyxin-hydrocort 3.5 mg-10,000 unit/mL-1 % ear drops,susp   SHAKE LIQUID AND INSTILL 1 DROP TO AFFECTED EAR FOUR TIMES DAILY FOR 10 DAYS     omeprazole 20 MG capsule  Commonly known as: PRILOSEC  Take 20 mg by mouth once daily.     prednisoLONE acetate 1 % Drps  Commonly known as: PRED FORTE  1 drop 4 (four) times daily.     pregabalin 100 MG capsule  Commonly known as: LYRICA  pregabalin 100 mg capsule   TAKE ONE CAPSULE BY MOUTH TWICE DAILY     rosuvastatin 5 MG tablet  Commonly known as: CRESTOR  rosuvastatin 5 mg tablet     zolpidem 10 mg Tab  Commonly known as: AMBIEN  zolpidem 10 mg tablet   TAKE 1 TABLET BY MOUTH EVERY DAY AT BEDTIME AS NEEDED        STOP taking these medications    amoxicillin 500 MG capsule  Commonly known as: AMOXIL     doxycycline 100 MG Cap  Commonly known as: VIBRAMYCIN     doxycycline 100 MG capsule  Commonly known as: MONODOX     ibuprofen 800 MG tablet  Commonly known as: ADVIL,MOTRIN     lisinopriL 40 MG tablet  Commonly known as: PRINIVIL,ZESTRIL     minocycline 100 MG capsule  Commonly known as: MINOCIN,DYNACIN     sulindac 150 MG tablet  Commonly known as: CLINORIL            Indwelling Lines/Drains at time of discharge:   Lines/Drains/Airways     None                 Time spent on the discharge of patient: 35 minutes         Renee Galdamez MD  Department of Hospital Medicine  [facility]

## 2022-05-21 NOTE — SUBJECTIVE & OBJECTIVE
Interval History: awake and alert, feels a lot better.     Possible discharge today.       Review of Systems   Constitutional:  Negative for chills, fatigue and fever.   HENT:  Negative for congestion, postnasal drip, rhinorrhea and tinnitus.    Respiratory:  Positive for cough. Negative for chest tightness, shortness of breath and wheezing.    Gastrointestinal:  Negative for abdominal pain, nausea and vomiting.   Genitourinary:  Negative for dysuria and pelvic pain.   Musculoskeletal:  Negative for back pain.   Skin:  Negative for color change and rash.   Neurological:  Negative for headaches.   Psychiatric/Behavioral:  Negative for agitation.    Objective:     Vital Signs (Most Recent):  Temp: 97.9 °F (36.6 °C) (05/21/22 0913)  Pulse: 104 (05/21/22 0913)  Resp: (!) 24 (05/21/22 0913)  BP: (!) 152/70 (05/21/22 0913)  SpO2: (!) 94 % (05/21/22 0913)   Vital Signs (24h Range):  Temp:  [97.9 °F (36.6 °C)-98.7 °F (37.1 °C)] 97.9 °F (36.6 °C)  Pulse:  [] 104  Resp:  [16-24] 24  SpO2:  [92 %-97 %] 94 %  BP: (129-152)/(56-79) 152/70     Weight: 121 kg (266 lb 12.1 oz)  Body mass index is 45.79 kg/m².  No intake or output data in the 24 hours ending 05/21/22 0936   Physical Exam  Constitutional:       Appearance: She is well-developed. She is obese.   HENT:      Head: Normocephalic and atraumatic.   Cardiovascular:      Rate and Rhythm: Normal rate and regular rhythm.      Heart sounds: Normal heart sounds. No murmur heard.    No friction rub. No gallop.   Pulmonary:      Effort: Pulmonary effort is normal. Tachypnea present.      Breath sounds: No wheezing.   Abdominal:      General: Bowel sounds are normal. There is no distension.      Palpations: Abdomen is soft.      Tenderness: There is no abdominal tenderness.      Comments: obese   Musculoskeletal:      Cervical back: Neck supple.      Right lower leg: No edema.      Left lower leg: No edema.   Skin:     General: Skin is warm.      Capillary Refill: Capillary  refill takes less than 2 seconds.   Neurological:      General: No focal deficit present.      Mental Status: She is alert and oriented to person, place, and time.   Psychiatric:         Speech: Speech normal.         Behavior: Behavior normal.         Thought Content: Thought content normal.       Significant Labs: A1C:   Recent Labs   Lab 05/21/22 0427   HGBA1C 9.2*     Blood Culture: No results for input(s): LABBLOO in the last 48 hours.  CBC:   Recent Labs   Lab 05/19/22 2108 05/21/22 0427   WBC 6.97 10.70   HGB 12.9 13.0   HCT 44.0 43.7    148*     CMP:   Recent Labs   Lab 05/19/22 2108 05/21/22 0427    143   K 3.1* 3.7    101   CO2 33* 30*   * 244*   BUN 17 15   CREATININE 0.66 0.8   CALCIUM 9.2 9.3   PROT 6.9  --    ALBUMIN 3.9  --    BILITOT 0.3  --    ALKPHOS 102  --    AST 34  --    ALT 35  --    ANIONGAP 12 12   EGFRNONAA >60.0 >60     Lactic Acid: No results for input(s): LACTATE in the last 48 hours.  Lipase: No results for input(s): LIPASE in the last 48 hours.  Lipid Panel: No results for input(s): CHOL, HDL, LDLCALC, TRIG, CHOLHDL in the last 48 hours.  Magnesium:   Recent Labs   Lab 05/21/22 0427   MG 1.9     POCT Glucose:   Recent Labs   Lab 05/20/22  2037 05/20/22  2345 05/21/22  0531   POCTGLUCOSE 316* 268* 241*     Troponin:   Recent Labs   Lab 05/19/22 2108   TROPONINI 0.027     TSH: No results for input(s): TSH in the last 4320 hours.  Urine Culture: No results for input(s): LABURIN in the last 48 hours.  Urine Studies: No results for input(s): COLORU, APPEARANCEUA, PHUR, SPECGRAV, PROTEINUA, GLUCUA, KETONESU, BILIRUBINUA, OCCULTUA, NITRITE, UROBILINOGEN, LEUKOCYTESUR, RBCUA, WBCUA, BACTERIA, SQUAMEPITHEL, HYALINECASTS in the last 48 hours.    Invalid input(s): ASHLYN    Significant Imaging: I have reviewed all pertinent imaging results/findings within the past 24 hours.

## 2022-05-21 NOTE — NURSING
Pt blood glucose checked and reported to nurse 316mg/dL EUGENIO Manzo notified new orders given for 4units of aspart subQ with recheck in one hour. Recheck blood glucose 268mg/dL and reported to EUGENIO Manzo no new orders given. Will continue to monitor pt.

## 2022-05-21 NOTE — PLAN OF CARE
The sw spoke to the pt via phone in her room and she states her dtr Shawne Phoenix [phone] is here to transport her home and she received her bsc. The pt has no further Case Management needs.        05/21/22 0858   Final Note   Assessment Type Final Discharge Note   Anticipated Discharge Disposition Home   What phone number can be called within the next 1-3 days to see how you are doing after discharge? [phone]   Hospital Resources/Appts/Education Provided Appointments scheduled and added to AVS   Post-Acute Status   Discharge Delays None known at this time

## 2022-05-21 NOTE — PLAN OF CARE
Discharge orders noted. Additional clinical references attached.    Patient's discharge instructions given by bedside RN and reviewed via this VN.  Education provided on new medication, diagnosis, and follow-up appointments.  Teach back method used. Patient verbalized understanding. All questions answered. DaughterThuan will come  patient. Floor nurse notified.

## 2022-05-21 NOTE — ASSESSMENT & PLAN NOTE
Hemoglobin A1C   Date Value Ref Range Status   06/16/2021 6.6 (H) 4.0 - 5.6 % Final   -A1c pending  -hold home meds while inpatient  -[facility]  -Saul SIERRA

## 2022-06-07 ENCOUNTER — HOSPITAL ENCOUNTER (EMERGENCY)
Facility: HOSPITAL | Age: 82
Discharge: HOME | End: 2022-06-07
Attending: EMERGENCY MEDICINE
Payer: MEDICARE

## 2022-06-07 VITALS
RESPIRATION RATE: 18 BRPM | HEIGHT: 64 IN | TEMPERATURE: 98 F | DIASTOLIC BLOOD PRESSURE: 85 MMHG | WEIGHT: 266 LBS | OXYGEN SATURATION: 96 % | HEART RATE: 85 BPM | SYSTOLIC BLOOD PRESSURE: 177 MMHG | BODY MASS INDEX: 45.41 KG/M2

## 2022-06-07 DIAGNOSIS — R06.02 SOB (SHORTNESS OF BREATH): ICD-10-CM

## 2022-06-07 LAB
ALBUMIN SERPL BCP-MCNC: 3.1 G/DL (ref 3.5–5.2)
ALP SERPL-CCNC: 98 U/L (ref 55–135)
ALT SERPL W/O P-5'-P-CCNC: 54 U/L (ref 10–44)
ANION GAP SERPL CALC-SCNC: 12 MMOL/L (ref 8–16)
AST SERPL-CCNC: 47 U/L (ref 10–40)
BASOPHILS # BLD AUTO: 0.05 K/UL (ref 0–0.2)
BASOPHILS NFR BLD: 0.8 % (ref 0–1.9)
BILIRUB SERPL-MCNC: 0.2 MG/DL (ref 0.1–1)
BNP SERPL-MCNC: 147 PG/ML (ref 0–99)
BUN SERPL-MCNC: 13 MG/DL (ref 8–23)
CALCIUM SERPL-MCNC: 9.3 MG/DL (ref 8.7–10.5)
CHLORIDE SERPL-SCNC: 106 MMOL/L (ref 95–110)
CO2 SERPL-SCNC: 25 MMOL/L (ref 23–29)
CREAT SERPL-MCNC: 0.8 MG/DL (ref 0.5–1.4)
DIFFERENTIAL METHOD: ABNORMAL
EOSINOPHIL # BLD AUTO: 0.1 K/UL (ref 0–0.5)
EOSINOPHIL NFR BLD: 2 % (ref 0–8)
ERYTHROCYTE [DISTWIDTH] IN BLOOD BY AUTOMATED COUNT: 14.6 % (ref 11.5–14.5)
EST. GFR  (AFRICAN AMERICAN): >60 ML/MIN/1.73 M^2
EST. GFR  (NON AFRICAN AMERICAN): >60 ML/MIN/1.73 M^2
GLUCOSE SERPL-MCNC: 235 MG/DL (ref 70–110)
HCT VFR BLD AUTO: 44.7 % (ref 37–48.5)
HGB BLD-MCNC: 13 G/DL (ref 12–16)
IMM GRANULOCYTES # BLD AUTO: 0.02 K/UL (ref 0–0.04)
IMM GRANULOCYTES NFR BLD AUTO: 0.3 % (ref 0–0.5)
INR PPP: 1 (ref 0.8–1.2)
LYMPHOCYTES # BLD AUTO: 1.5 K/UL (ref 1–4.8)
LYMPHOCYTES NFR BLD: 25.4 % (ref 18–48)
MCH RBC QN AUTO: 24.8 PG (ref 27–31)
MCHC RBC AUTO-ENTMCNC: 29.1 G/DL (ref 32–36)
MCV RBC AUTO: 85 FL (ref 82–98)
MONOCYTES # BLD AUTO: 0.6 K/UL (ref 0.3–1)
MONOCYTES NFR BLD: 10 % (ref 4–15)
NEUTROPHILS # BLD AUTO: 3.7 K/UL (ref 1.8–7.7)
NEUTROPHILS NFR BLD: 61.5 % (ref 38–73)
NRBC BLD-RTO: 0 /100 WBC
PLATELET # BLD AUTO: 152 K/UL (ref 150–450)
PMV BLD AUTO: 11.6 FL (ref 9.2–12.9)
POCT GLUCOSE: 266 MG/DL (ref 70–110)
POTASSIUM SERPL-SCNC: 3.5 MMOL/L (ref 3.5–5.1)
PROT SERPL-MCNC: 6.5 G/DL (ref 6–8.4)
PROTHROMBIN TIME: 10.3 SEC (ref 9–12.5)
RBC # BLD AUTO: 5.24 M/UL (ref 4–5.4)
SODIUM SERPL-SCNC: 143 MMOL/L (ref 136–145)
TROPONIN I SERPL DL<=0.01 NG/ML-MCNC: 0.01 NG/ML (ref 0–0.03)
WBC # BLD AUTO: 5.98 K/UL (ref 3.9–12.7)

## 2022-06-07 PROCEDURE — 84484 ASSAY OF TROPONIN QUANT: CPT | Performed by: EMERGENCY MEDICINE

## 2022-06-07 PROCEDURE — 82962 GLUCOSE BLOOD TEST: CPT

## 2022-06-07 PROCEDURE — 93010 ELECTROCARDIOGRAM REPORT: CPT | Mod: ,,, | Performed by: INTERNAL MEDICINE

## 2022-06-07 PROCEDURE — 99285 EMERGENCY DEPT VISIT HI MDM: CPT | Mod: 25

## 2022-06-07 PROCEDURE — 99285 EMERGENCY DEPT VISIT HI MDM: CPT | Mod: ,,, | Performed by: EMERGENCY MEDICINE

## 2022-06-07 PROCEDURE — 85025 COMPLETE CBC W/AUTO DIFF WBC: CPT | Performed by: EMERGENCY MEDICINE

## 2022-06-07 PROCEDURE — 85610 PROTHROMBIN TIME: CPT | Performed by: EMERGENCY MEDICINE

## 2022-06-07 PROCEDURE — 93010 EKG 12-LEAD: ICD-10-PCS | Mod: ,,, | Performed by: INTERNAL MEDICINE

## 2022-06-07 PROCEDURE — 99285 PR EMERGENCY DEPT VISIT,LEVEL V: ICD-10-PCS | Mod: ,,, | Performed by: EMERGENCY MEDICINE

## 2022-06-07 PROCEDURE — 93005 ELECTROCARDIOGRAM TRACING: CPT

## 2022-06-07 PROCEDURE — 80053 COMPREHEN METABOLIC PANEL: CPT | Performed by: EMERGENCY MEDICINE

## 2022-06-07 PROCEDURE — 83880 ASSAY OF NATRIURETIC PEPTIDE: CPT | Performed by: EMERGENCY MEDICINE

## 2022-06-07 RX ORDER — FUROSEMIDE 20 MG/1
20 TABLET ORAL 2 TIMES DAILY
Qty: 60 TABLET | Refills: 5 | Status: ON HOLD | OUTPATIENT
Start: 2022-06-07 | End: 2022-09-24

## 2022-06-07 RX ORDER — FUROSEMIDE 20 MG/1
20 TABLET ORAL 2 TIMES DAILY
Qty: 60 TABLET | Refills: 0 | Status: SHIPPED | OUTPATIENT
Start: 2022-06-07 | End: 2022-06-07 | Stop reason: SDUPTHER

## 2022-06-08 NOTE — ED NOTES
Patient identifiers verified and correct for   LOC: The patient is awake, alert and aware of environment with an appropriate affect, the patient is oriented x 3 and speaking appropriately.   APPEARANCE: Patient appears comfortable and in no acute distress, patient is clean and well groomed.  SKIN: The skin is warm and dry, color consistent with ethnicity, patient has normal skin turgor and moist mucus membranes, skin intact, no breakdown or bruising noted.   MUSCULOSKELETAL: Patient moving all extremities spontaneously, no swelling noted.  RESPIRATORY: Airway is open and patent, respirations are spontaneous, patient has a normal effort and rate, no accessory muscle use noted, pt placed on continuous pulse ox with O2 sats noted at 97% on room air.  CARDIAC: Pt placed on cardiac monitor. Patient has a normal rate and regular rhythm, no edema noted, capillary refill < 3 seconds.   GASTRO: Soft and non tender to palpation, no distention noted, normoactive bowel sounds present in all four quadrants. Pt states bowel movements have been regular.  : Pt denies any pain or frequency with urination.  NEURO: Pt opens eyes spontaneously, behavior appropriate to situation, follows commands, facial expression symmetrical, bilateral hand grasp equal and even, purposeful motor response noted, normal sensation in all extremities when touched with a finger.

## 2022-06-08 NOTE — ED PROVIDER NOTES
SCRIBE #1 NOTE: I, Bandar Dudley, am scribing for, and in the presence of,  Sohan Guo MD. I have scribed the following portions of the note - Other sections scribed: HPI,ROS,PE.   SCRIBE #2 NOTE: I, Joannathelma Caldera, am scribing for, and in the presence of,  Sohan Guo MD. I have scribed the following portions of the note - Other sections scribed: HPI,ROS,PE.      Source of History:  Patient and relative     Chief complaint:  Multiple complaints (Admitted last week and told copd, wheezing never stopped and inhaler not working, sugar was 300)      HPI:  Jayne You is a 82 y.o. female presenting with wheezing onset two weeks ago and high blood pressure.  After patient returned from an appointment today home health evaluated her and noticed that she was hypertensive in wheezing, recommended she come here for evaluation.  She was admitted two and a half weeks ago and was admitted for COPD, she was not given an inhaler but was given breathing treatments at [facility] and discharged with prednisone r. She had been experiencing swelling in her arms for about two months and the lasix is not helping. The patient's relative states that she has been taking the lasix for a while but that she thinks that it is old. Today the patient started to notice swelling bilaterally in her legs and ankles, in addition the upper extremity swelling which has been ongoing for about the same amount of time as the wheezing. Patient reports that she has been experiencing shortness of breath while walking. She is not a smoker.      ROS: As per HPI and below:    General: No fever.  No chills.  Eyes: No visual changes.  Head: No headache.    Integument: No rashes or lesions.  Chest: + shortness of breath. +Wheezing   Cardiovascular: No chest pain.  Abdomen: No abdominal pain.  No nausea or vomiting.  Urinary: No abnormal urination.  Neurologic: No focal weakness.  No numbness.  Hematologic: No easy bruising.  Endocrine: No  excessive thirst or urination.  Musculoskeletal:+ Bilateral arm, foot, and ankle swelling      Review of patient's allergies indicates:   Allergen Reactions    Codeine Nausea And Vomiting     Pt reports can take percocet    Hydrocodone-acetaminophen Nausea And Vomiting    Propoxyphene Nausea And Vomiting       No current facility-administered medications on file prior to encounter.     Current Outpatient Medications on File Prior to Encounter   Medication Sig Dispense Refill    acetaminophen (TYLENOL) 650 MG TbSR Tylenol arthritis 650 mg by mouth twice daily as needed for pain.  0    amLODIPine (NORVASC) 10 MG tablet Take 10 mg by mouth once daily.      aspirin 81 MG Chew Take 1 tablet (81 mg total) by mouth once daily.  0    baclofen (LIORESAL) 10 MG tablet Take 1 tablet (10 mg total) by mouth every evening.      benzonatate (TESSALON) 100 MG capsule Take 100 mg by mouth 3 (three) times daily as needed for Cough.      diclofenac sodium (VOLTAREN) 1 % Gel Apply 2 g topically 2 (two) times daily. To left shoulder 100 g 1    DULoxetine (CYMBALTA) 30 MG capsule Take 30 mg by mouth once daily.      ergocalciferol (ERGOCALCIFEROL) 50,000 unit Cap ergocalciferol (vitamin D2) 1,250 mcg (50,000 unit) capsule   TAKE 1 CAPSULE BY MOUTH 1 TIME A WEEK      gabapentin (NEURONTIN) 300 MG capsule gabapentin 300 mg capsule      glipiZIDE (GLUCOTROL) 5 MG tablet glipizide 5 mg tablet      hydroCHLOROthiazide (HYDRODIURIL) 12.5 MG Tab Take 12.5 mg by mouth once daily.      HYDROcodone-acetaminophen (NORCO)  mg per tablet hydrocodone 10 mg-acetaminophen 325 mg tablet   TAKE 1 TABLET BY MOUTH EVERY 6 TO 8 HOURS AS NEEDED FOR PAIN      levocetirizine (XYZAL) 5 MG tablet levocetirizine 5 mg tablet      losartan (COZAAR) 100 MG tablet losartan 100 mg tablet      melatonin (MELATIN) 3 mg tablet Take 2 tablets (6 mg total) by mouth nightly as needed for Insomnia.  0    neomycin-polymyxin-hydrocortisone (CORTISPORIN)  3.5-10,000-1 mg/mL-unit/mL-% otic suspension neomycin-polymyxin-hydrocort 3.5 mg-10,000 unit/mL-1 % ear drops,susp   SHAKE LIQUID AND INSTILL 1 DROP TO AFFECTED EAR FOUR TIMES DAILY FOR 10 DAYS      omeprazole (PRILOSEC) 20 MG capsule Take 20 mg by mouth once daily.      prednisoLONE acetate (PRED FORTE) 1 % DrpS 1 drop 4 (four) times daily.      pregabalin (LYRICA) 100 MG capsule pregabalin 100 mg capsule   TAKE ONE CAPSULE BY MOUTH TWICE DAILY      rosuvastatin (CRESTOR) 5 MG tablet rosuvastatin 5 mg tablet      zolpidem (AMBIEN) 10 mg Tab zolpidem 10 mg tablet   TAKE 1 TABLET BY MOUTH EVERY DAY AT BEDTIME AS NEEDED      [DISCONTINUED] furosemide (LASIX) 20 MG tablet Take 20 mg by mouth once daily.      [DISCONTINUED] lisinopril (PRINIVIL,ZESTRIL) 40 MG tablet Take 40 mg by mouth once daily.         PMH:  As per HPI and below:  Past Medical History:   Diagnosis Date    Arthritis     Cardiomyopathy     Depression     Diabetes mellitus     Hypertension     Lipids abnormal     Lumbar herniated disc     Obesity     RBBB     Sleep apnea      Past Surgical History:   Procedure Laterality Date    APPENDECTOMY      BACK SURGERY  2014,2016,2017    BREAST SURGERY Bilateral     reduction    EYE SURGERY Bilateral     HIP ARTHROPLASTY Left 11/5/2020    Procedure: ARTHROPLASTY, HIP;  Surgeon: Isaiah Mccormick MD;  Location: [facility];  Service: Orthopedics;  Laterality: Left;    JOINT REPLACEMENT Right 2006    hip    REVISION TOTAL HIP ARTHROPLASTY Left 11/30/2020    Procedure: REVISION, TOTAL ARTHROPLASTY, HIP;  Surgeon: Isaiah Mccormick MD;  Location: [facility] OR;  Service: Orthopedics;  Laterality: Left;    TOTAL HIP ARTHROPLASTY Right 2006    TOTAL KNEE ARTHROPLASTY Left 7/18/2019    Procedure: ARTHROPLASTY, KNEE, TOTAL;  Surgeon: Isaiah Mccormick MD;  Location: [facility];  Service: Orthopedics;  Laterality: Left;       Social History     Socioeconomic History    Marital status:    Tobacco  Use    Smoking status: Never Smoker    Smokeless tobacco: Never Used   Substance and Sexual Activity    Alcohol use: No       No family history on file.    Physical Exam:    Vitals:    06/07/22 1804   BP: (!) 146/78   Pulse: 92   Resp: 18   Temp: 98.2 °F (36.8 °C)     Appearance: No acute distress.  Skin: No rashes seen.  Good turgor.  No abrasions.  No ecchymoses.  Eyes: No conjunctival injection. EOMI, PERRL.  ENT: Oropharynx clear.    Chest:  No increased work of breathing, bilateral chest rise. Clear breath sounds without wheezing in all lung fields.  Cardiovascular: Regular rate and rhythm.  Normal equal bilateral radial pulses.  Abdomen: Soft.  Not distended.  Nontender.  No guarding.  No rebound. No Masses  Musculoskeletal: Good range of motion all joints.  No deformities.  Neck supple, full range of motion, no obvious deformity.  Neurologic: Moves all extremities.  Normal sensation.  No facial droop.  Normal speech.    Mental Status:  Alert and oriented x 3.  Appropriate, conversant.          Laboratory Studies:  Labs Reviewed   CBC W/ AUTO DIFFERENTIAL - Abnormal; Notable for the following components:       Result Value    MCH 24.8 (*)     MCHC 29.1 (*)     RDW 14.6 (*)     All other components within normal limits   COMPREHENSIVE METABOLIC PANEL - Abnormal; Notable for the following components:    Glucose 235 (*)     Albumin 3.1 (*)     AST 47 (*)     ALT 54 (*)     All other components within normal limits   B-TYPE NATRIURETIC PEPTIDE - Abnormal; Notable for the following components:     (*)     All other components within normal limits   POCT GLUCOSE - Abnormal; Notable for the following components:    POCT Glucose 266 (*)     All other components within normal limits   TROPONIN I   PROTIME-INR       I decided to obtain the old medical records.  Reviewed and summarized the old medical record and it showed admission with diagnosis of COPD exacerbation about 2 weeks ago.  CTA at that time showed  no pulmonary embolism  I independently interpreted the CXR:  Mild pulmonary edema  I independently interpreted the EKG:  Normal sinus rhythm, no ST changes    Imaging Results          X-Ray Chest AP Portable (In process)                 Medications Given:  Medications - No data to display    Discussed with: pt and family    MDM:    82 y.o. female with wheezing and hypertension sent in for evaluation.  At the time of my evaluation her hypertension has resolved, she has no wheezing on auscultation, and is in no respiratory distress.  She has trace edema, but no significant swelling.  Her BNP is equivocal, but her chest x-ray is consistent with fluid overload.  Troponin is negative, other labs are noncontributory.  Do not suspect acute coronary syndrome, pneumonia, or COPD, but more likely I think this represents early CHF.  The patient is taking Lasix, however her prescription  2 years ago.  She was given a fresh prescription today, advised to take it twice daily, and advised to follow-up with primary doctor.  Advised pt to follow up with PCP or return if concerning symptoms arise. Pt understands and agrees with plan. Will d/c home.          Diagnostic Impression:    1. SOB (shortness of breath)      Scribe Attestation:  Scribe #1: I performed the above scribed service and the documentation accurately describes the services I performed. I attest to the accuracy of the note.  Scribe #2: I performed the above scribed service and the documentation accurately describes the services I performed. I attest to the accuracy of the note.     Sohan Guo MD  22       Sohan Guo MD  22

## 2022-09-23 ENCOUNTER — HOSPITAL ENCOUNTER (OUTPATIENT)
Facility: HOSPITAL | Age: 82
LOS: 1 days | Discharge: HOME | End: 2022-09-28
Attending: FAMILY MEDICINE
Payer: MEDICARE

## 2022-09-23 DIAGNOSIS — R41.82 ALTERED MENTAL STATUS, UNSPECIFIED ALTERED MENTAL STATUS TYPE: Primary | ICD-10-CM

## 2022-09-23 DIAGNOSIS — I10 PRIMARY HYPERTENSION: ICD-10-CM

## 2022-09-23 DIAGNOSIS — I63.9 STROKE: ICD-10-CM

## 2022-09-23 DIAGNOSIS — R29.90 STROKE-LIKE SYMPTOMS: ICD-10-CM

## 2022-09-23 LAB
ALBUMIN SERPL BCP-MCNC: 4.4 G/DL (ref 3.5–5.2)
ALP SERPL-CCNC: 99 U/L (ref 38–126)
ALT SERPL W/O P-5'-P-CCNC: 27 U/L (ref 10–44)
AMPHET+METHAMPHET UR QL: NEGATIVE
ANION GAP SERPL CALC-SCNC: 11 MMOL/L (ref 8–16)
AST SERPL-CCNC: 34 U/L (ref 15–46)
BARBITURATES UR QL SCN>200 NG/ML: NEGATIVE
BASOPHILS # BLD AUTO: 0.04 K/UL (ref 0–0.2)
BASOPHILS NFR BLD: 0.8 % (ref 0–1.9)
BENZODIAZ UR QL SCN>200 NG/ML: NEGATIVE
BILIRUB SERPL-MCNC: 0.9 MG/DL (ref 0.1–1)
BILIRUB UR QL STRIP: ABNORMAL
BZE UR QL SCN: NEGATIVE
CALCIUM SERPL-MCNC: 9.6 MG/DL (ref 8.7–10.5)
CANNABINOIDS UR QL SCN: NEGATIVE
CHLORIDE SERPL-SCNC: 99 MMOL/L (ref 95–110)
CLARITY UR REFRACT.AUTO: CLEAR
CO2 SERPL-SCNC: 34 MMOL/L (ref 23–29)
COLOR UR AUTO: YELLOW
CREAT SERPL-MCNC: 0.82 MG/DL (ref 0.5–1.4)
CREAT UR-MCNC: 196.8 MG/DL (ref 15–325)
DIFFERENTIAL METHOD: ABNORMAL
EOSINOPHIL # BLD AUTO: 0 K/UL (ref 0–0.5)
EOSINOPHIL NFR BLD: 0.8 % (ref 0–8)
ERYTHROCYTE [DISTWIDTH] IN BLOOD BY AUTOMATED COUNT: 15.6 % (ref 11.5–14.5)
EST. GFR  (NO RACE VARIABLE): >60 ML/MIN/1.73 M^2
GLUCOSE SERPL-MCNC: 244 MG/DL (ref 70–110)
GLUCOSE UR QL STRIP: NEGATIVE
HCT VFR BLD AUTO: 50.7 % (ref 37–48.5)
HGB BLD-MCNC: 15.2 G/DL (ref 12–16)
HGB UR QL STRIP: NEGATIVE
IMM GRANULOCYTES # BLD AUTO: 0.01 K/UL (ref 0–0.04)
IMM GRANULOCYTES NFR BLD AUTO: 0.2 % (ref 0–0.5)
INR PPP: 1.1 (ref 0.8–1.2)
KETONES UR QL STRIP: ABNORMAL
LEUKOCYTE ESTERASE UR QL STRIP: NEGATIVE
LYMPHOCYTES # BLD AUTO: 1.3 K/UL (ref 1–4.8)
LYMPHOCYTES NFR BLD: 27.2 % (ref 18–48)
MCH RBC QN AUTO: 24.8 PG (ref 27–31)
MCHC RBC AUTO-ENTMCNC: 30 G/DL (ref 32–36)
MCV RBC AUTO: 83 FL (ref 82–98)
METHADONE UR QL SCN>300 NG/ML: NEGATIVE
MONOCYTES # BLD AUTO: 0.6 K/UL (ref 0.3–1)
MONOCYTES NFR BLD: 11.4 % (ref 4–15)
NEUTROPHILS # BLD AUTO: 2.9 K/UL (ref 1.8–7.7)
NEUTROPHILS NFR BLD: 59.6 % (ref 38–73)
NITRITE UR QL STRIP: NEGATIVE
NRBC BLD-RTO: 0 /100 WBC
OPIATES UR QL SCN: NEGATIVE
PCP UR QL SCN>25 NG/ML: NEGATIVE
PH UR STRIP: 8 [PH] (ref 5–8)
PLATELET # BLD AUTO: 173 K/UL (ref 150–450)
PMV BLD AUTO: ABNORMAL FL (ref 9.2–12.9)
POCT GLUCOSE: 230 MG/DL (ref 70–110)
POTASSIUM SERPL-SCNC: 3.5 MMOL/L (ref 3.5–5.1)
PROT SERPL-MCNC: 7.6 G/DL (ref 6–8.4)
PROT UR QL STRIP: ABNORMAL
PROTHROMBIN TIME: 11.8 SEC (ref 9–12.5)
RBC # BLD AUTO: 6.13 M/UL (ref 4–5.4)
SARS-COV-2 RDRP RESP QL NAA+PROBE: NEGATIVE
SODIUM SERPL-SCNC: 144 MMOL/L (ref 136–145)
SP GR UR STRIP: 1.01 (ref 1–1.03)
TOXICOLOGY INFORMATION: NORMAL
TSH SERPL DL<=0.005 MIU/L-ACNC: 1.94 UIU/ML (ref 0.4–4)
URN SPEC COLLECT METH UR: ABNORMAL
UROBILINOGEN UR STRIP-ACNC: NEGATIVE EU/DL
UUN UR-MCNC: 13 MG/DL (ref 7–17)
WBC # BLD AUTO: 4.93 K/UL (ref 3.9–12.7)

## 2022-09-23 PROCEDURE — 93010 EKG 12-LEAD: ICD-10-PCS | Mod: ,,, | Performed by: INTERNAL MEDICINE

## 2022-09-23 PROCEDURE — 82962 GLUCOSE BLOOD TEST: CPT | Mod: ER

## 2022-09-23 PROCEDURE — 85610 PROTHROMBIN TIME: CPT | Mod: ER | Performed by: FAMILY MEDICINE

## 2022-09-23 PROCEDURE — 81003 URINALYSIS AUTO W/O SCOPE: CPT | Mod: ER | Performed by: FAMILY MEDICINE

## 2022-09-23 PROCEDURE — 80307 DRUG TEST PRSMV CHEM ANLYZR: CPT | Mod: ER | Performed by: FAMILY MEDICINE

## 2022-09-23 PROCEDURE — 25000003 PHARM REV CODE 250: Mod: ER | Performed by: FAMILY MEDICINE

## 2022-09-23 PROCEDURE — 93005 ELECTROCARDIOGRAM TRACING: CPT | Mod: ER

## 2022-09-23 PROCEDURE — U0002 COVID-19 LAB TEST NON-CDC: HCPCS | Mod: ER | Performed by: FAMILY MEDICINE

## 2022-09-23 PROCEDURE — 80053 COMPREHEN METABOLIC PANEL: CPT | Mod: ER | Performed by: FAMILY MEDICINE

## 2022-09-23 PROCEDURE — 63600175 PHARM REV CODE 636 W HCPCS: Mod: ER | Performed by: FAMILY MEDICINE

## 2022-09-23 PROCEDURE — 25500020 PHARM REV CODE 255: Mod: ER | Performed by: FAMILY MEDICINE

## 2022-09-23 PROCEDURE — 99285 EMERGENCY DEPT VISIT HI MDM: CPT | Mod: 25,ER

## 2022-09-23 PROCEDURE — 93010 ELECTROCARDIOGRAM REPORT: CPT | Mod: ,,, | Performed by: INTERNAL MEDICINE

## 2022-09-23 PROCEDURE — 99900035 HC TECH TIME PER 15 MIN (STAT): Mod: ER

## 2022-09-23 PROCEDURE — 96375 TX/PRO/DX INJ NEW DRUG ADDON: CPT | Mod: ER

## 2022-09-23 PROCEDURE — 94760 N-INVAS EAR/PLS OXIMETRY 1: CPT | Mod: ER

## 2022-09-23 PROCEDURE — 84443 ASSAY THYROID STIM HORMONE: CPT | Mod: ER | Performed by: FAMILY MEDICINE

## 2022-09-23 PROCEDURE — 85025 COMPLETE CBC W/AUTO DIFF WBC: CPT | Mod: ER | Performed by: FAMILY MEDICINE

## 2022-09-23 RX ORDER — AMOXICILLIN 500 MG/1
500 CAPSULE ORAL EVERY 8 HOURS
Status: ON HOLD | COMMUNITY
Start: 2022-09-19 | End: 2022-09-28 | Stop reason: HOSPADM

## 2022-09-23 RX ORDER — IPRATROPIUM BROMIDE AND ALBUTEROL SULFATE 2.5; .5 MG/3ML; MG/3ML
SOLUTION RESPIRATORY (INHALATION)
COMMUNITY
Start: 2022-07-29

## 2022-09-23 RX ORDER — AMLODIPINE BESYLATE 5 MG/1
10 TABLET ORAL
Status: COMPLETED | OUTPATIENT
Start: 2022-09-23 | End: 2022-09-23

## 2022-09-23 RX ORDER — TRAMADOL HYDROCHLORIDE 50 MG/1
TABLET ORAL
COMMUNITY

## 2022-09-23 RX ORDER — ALBUTEROL SULFATE 90 UG/1
AEROSOL, METERED RESPIRATORY (INHALATION)
COMMUNITY

## 2022-09-23 RX ORDER — KETOROLAC TROMETHAMINE 30 MG/ML
15 INJECTION, SOLUTION INTRAMUSCULAR; INTRAVENOUS
Status: COMPLETED | OUTPATIENT
Start: 2022-09-23 | End: 2022-09-23

## 2022-09-23 RX ADMIN — IOHEXOL 100 ML: 350 INJECTION, SOLUTION INTRAVENOUS at 08:09

## 2022-09-23 RX ADMIN — AMLODIPINE BESYLATE 10 MG: 5 TABLET ORAL at 10:09

## 2022-09-23 RX ADMIN — KETOROLAC TROMETHAMINE 15 MG: 30 INJECTION, SOLUTION INTRAMUSCULAR; INTRAVENOUS at 10:09

## 2022-09-24 PROBLEM — R29.90 STROKE-LIKE SYMPTOMS: Status: ACTIVE | Noted: 2022-09-24

## 2022-09-24 PROBLEM — G93.40 ACUTE ENCEPHALOPATHY: Status: ACTIVE | Noted: 2022-09-24

## 2022-09-24 PROBLEM — I63.9 STROKE: Status: ACTIVE | Noted: 2022-09-24

## 2022-09-24 LAB
ALBUMIN SERPL BCP-MCNC: 3.9 G/DL (ref 3.5–5.2)
ALP SERPL-CCNC: 96 U/L (ref 55–135)
ALT SERPL W/O P-5'-P-CCNC: 19 U/L (ref 10–44)
AMMONIA PLAS-SCNC: 48 UMOL/L (ref 10–50)
ANION GAP SERPL CALC-SCNC: 11 MMOL/L (ref 8–16)
ANION GAP SERPL CALC-SCNC: 12 MMOL/L (ref 8–16)
ANION GAP SERPL CALC-SCNC: 16 MMOL/L (ref 8–16)
ANISOCYTOSIS BLD QL SMEAR: SLIGHT
AORTIC ROOT ANNULUS: 3.81 CM
AST SERPL-CCNC: 21 U/L (ref 10–40)
AV INDEX (PROSTH): 0.76
AV MEAN GRADIENT: 4 MMHG
AV PEAK GRADIENT: 7 MMHG
AV VALVE AREA: 3.84 CM2
AV VELOCITY RATIO: 0.63
BASOPHILS # BLD AUTO: 0.04 K/UL (ref 0–0.2)
BASOPHILS NFR BLD: 0.7 % (ref 0–1.9)
BILIRUB SERPL-MCNC: 0.7 MG/DL (ref 0.1–1)
BSA FOR ECHO PROCEDURE: 2.35 M2
BUN SERPL-MCNC: 12 MG/DL (ref 8–23)
BUN SERPL-MCNC: 15 MG/DL (ref 8–23)
BUN SERPL-MCNC: 9 MG/DL (ref 8–23)
CALCIUM SERPL-MCNC: 9.6 MG/DL (ref 8.7–10.5)
CALCIUM SERPL-MCNC: 9.8 MG/DL (ref 8.7–10.5)
CALCIUM SERPL-MCNC: 9.9 MG/DL (ref 8.7–10.5)
CHLORIDE SERPL-SCNC: 95 MMOL/L (ref 95–110)
CHLORIDE SERPL-SCNC: 96 MMOL/L (ref 95–110)
CHLORIDE SERPL-SCNC: 99 MMOL/L (ref 95–110)
CHOLEST SERPL-MCNC: 150 MG/DL (ref 120–199)
CHOLEST/HDLC SERPL: 3 {RATIO} (ref 2–5)
CK MB SERPL-MCNC: 2.7 NG/ML (ref 0.1–6.5)
CK MB SERPL-RTO: 1.3 % (ref 0–5)
CK SERPL-CCNC: 206 U/L (ref 20–180)
CO2 SERPL-SCNC: 28 MMOL/L (ref 23–29)
CO2 SERPL-SCNC: 31 MMOL/L (ref 23–29)
CO2 SERPL-SCNC: 36 MMOL/L (ref 23–29)
CREAT SERPL-MCNC: 0.8 MG/DL (ref 0.5–1.4)
CREAT SERPL-MCNC: 0.8 MG/DL (ref 0.5–1.4)
CREAT SERPL-MCNC: 0.9 MG/DL (ref 0.5–1.4)
CV ECHO LV RWT: 0.51 CM
DIFFERENTIAL METHOD: ABNORMAL
DOP CALC AO PEAK VEL: 1.34 M/S
DOP CALC AO VTI: 20.7 CM
DOP CALC LVOT AREA: 5 CM2
DOP CALC LVOT DIAMETER: 2.53 CM
DOP CALC LVOT PEAK VEL: 0.84 M/S
DOP CALC LVOT STROKE VOLUME: 79.39 CM3
DOP CALCLVOT PEAK VEL VTI: 15.8 CM
ECHO LV POSTERIOR WALL: 1.29 CM (ref 0.6–1.1)
EJECTION FRACTION: 60 %
EOSINOPHIL # BLD AUTO: 0.1 K/UL (ref 0–0.5)
EOSINOPHIL NFR BLD: 1.3 % (ref 0–8)
ERYTHROCYTE [DISTWIDTH] IN BLOOD BY AUTOMATED COUNT: 14.3 % (ref 11.5–14.5)
EST. GFR  (NO RACE VARIABLE): >60 ML/MIN/1.73 M^2
ESTIMATED AVG GLUCOSE: 192 MG/DL (ref 68–131)
ETHANOL SERPL-MCNC: <10 MG/DL
FOLATE SERPL-MCNC: 17.1 NG/ML (ref 4–24)
FRACTIONAL SHORTENING: 31 % (ref 28–44)
GIANT PLATELETS BLD QL SMEAR: PRESENT
GLUCOSE SERPL-MCNC: 202 MG/DL (ref 70–110)
GLUCOSE SERPL-MCNC: 211 MG/DL (ref 70–110)
GLUCOSE SERPL-MCNC: 327 MG/DL (ref 70–110)
HBA1C MFR BLD: 8.3 % (ref 4–5.6)
HCT VFR BLD AUTO: 50.3 % (ref 37–48.5)
HDLC SERPL-MCNC: 50 MG/DL (ref 40–75)
HDLC SERPL: 33.3 % (ref 20–50)
HGB BLD-MCNC: 15 G/DL (ref 12–16)
IMM GRANULOCYTES # BLD AUTO: 0.01 K/UL (ref 0–0.04)
IMM GRANULOCYTES NFR BLD AUTO: 0.2 % (ref 0–0.5)
INTERVENTRICULAR SEPTUM: 1.57 CM (ref 0.6–1.1)
IVC DIAMETER: 1.72 CM
IVRT: 108.47 MSEC
LA MAJOR: 4.98 CM
LA MINOR: 4.98 CM
LDLC SERPL CALC-MCNC: 79.4 MG/DL (ref 63–159)
LEFT ATRIUM SIZE: 3.94 CM
LEFT ATRIUM VOLUME INDEX MOD: 25.6 ML/M2
LEFT ATRIUM VOLUME MOD: 56.93 CM3
LEFT INTERNAL DIMENSION IN SYSTOLE: 3.46 CM (ref 2.1–4)
LEFT VENTRICLE DIASTOLIC VOLUME INDEX: 53.8 ML/M2
LEFT VENTRICLE DIASTOLIC VOLUME: 119.44 ML
LEFT VENTRICLE MASS INDEX: 136 G/M2
LEFT VENTRICLE SYSTOLIC VOLUME INDEX: 22.3 ML/M2
LEFT VENTRICLE SYSTOLIC VOLUME: 49.5 ML
LEFT VENTRICULAR INTERNAL DIMENSION IN DIASTOLE: 5.02 CM (ref 3.5–6)
LEFT VENTRICULAR MASS: 302.45 G
LVOT MG: 1.33 MMHG
LVOT MV: 0.52 CM/S
LYMPHOCYTES # BLD AUTO: 1.5 K/UL (ref 1–4.8)
LYMPHOCYTES NFR BLD: 27.4 % (ref 18–48)
MAGNESIUM SERPL-MCNC: 1.8 MG/DL (ref 1.6–2.6)
MCH RBC QN AUTO: 24.8 PG (ref 27–31)
MCHC RBC AUTO-ENTMCNC: 29.8 G/DL (ref 32–36)
MCV RBC AUTO: 83 FL (ref 82–98)
MONOCYTES # BLD AUTO: 0.5 K/UL (ref 0.3–1)
MONOCYTES NFR BLD: 9.4 % (ref 4–15)
NEUTROPHILS # BLD AUTO: 3.3 K/UL (ref 1.8–7.7)
NEUTROPHILS NFR BLD: 61 % (ref 38–73)
NONHDLC SERPL-MCNC: 100 MG/DL
NRBC BLD-RTO: 0 /100 WBC
PHOSPHATE SERPL-MCNC: 2.7 MG/DL (ref 2.7–4.5)
PLATELET # BLD AUTO: 150 K/UL (ref 150–450)
PLATELET BLD QL SMEAR: ABNORMAL
PMV BLD AUTO: 14.4 FL (ref 9.2–12.9)
POCT GLUCOSE: 213 MG/DL (ref 70–110)
POCT GLUCOSE: 221 MG/DL (ref 70–110)
POCT GLUCOSE: 226 MG/DL (ref 70–110)
POCT GLUCOSE: 304 MG/DL (ref 70–110)
POTASSIUM SERPL-SCNC: 2.8 MMOL/L (ref 3.5–5.1)
POTASSIUM SERPL-SCNC: 3.5 MMOL/L (ref 3.5–5.1)
POTASSIUM SERPL-SCNC: 3.7 MMOL/L (ref 3.5–5.1)
PROT SERPL-MCNC: 7.7 G/DL (ref 6–8.4)
PULM VEIN S/D RATIO: 1.41
PV PEAK D VEL: 0.27 M/S
PV PEAK S VEL: 0.38 M/S
RA MAJOR: 3.94 CM
RA PRESSURE: 3 MMHG
RBC # BLD AUTO: 6.06 M/UL (ref 4–5.4)
RIGHT VENTRICULAR END-DIASTOLIC DIMENSION: 1.92 CM
SODIUM SERPL-SCNC: 139 MMOL/L (ref 136–145)
SODIUM SERPL-SCNC: 142 MMOL/L (ref 136–145)
SODIUM SERPL-SCNC: 143 MMOL/L (ref 136–145)
TRIGL SERPL-MCNC: 103 MG/DL (ref 30–150)
TROPONIN I SERPL DL<=0.01 NG/ML-MCNC: 0.04 NG/ML (ref 0–0.03)
TROPONIN I SERPL DL<=0.01 NG/ML-MCNC: 0.04 NG/ML (ref 0–0.03)
VIT B12 SERPL-MCNC: 484 PG/ML (ref 210–950)
WBC # BLD AUTO: 5.43 K/UL (ref 3.9–12.7)

## 2022-09-24 PROCEDURE — 82140 ASSAY OF AMMONIA: CPT | Performed by: INTERNAL MEDICINE

## 2022-09-24 PROCEDURE — 94761 N-INVAS EAR/PLS OXIMETRY MLT: CPT

## 2022-09-24 PROCEDURE — 83036 HEMOGLOBIN GLYCOSYLATED A1C: CPT | Performed by: NURSE PRACTITIONER

## 2022-09-24 PROCEDURE — 25000003 PHARM REV CODE 250: Performed by: INTERNAL MEDICINE

## 2022-09-24 PROCEDURE — 82607 VITAMIN B-12: CPT | Performed by: NURSE PRACTITIONER

## 2022-09-24 PROCEDURE — 97162 PT EVAL MOD COMPLEX 30 MIN: CPT

## 2022-09-24 PROCEDURE — 36415 COLL VENOUS BLD VENIPUNCTURE: CPT | Performed by: INTERNAL MEDICINE

## 2022-09-24 PROCEDURE — 80061 LIPID PANEL: CPT | Performed by: NURSE PRACTITIONER

## 2022-09-24 PROCEDURE — 97166 OT EVAL MOD COMPLEX 45 MIN: CPT

## 2022-09-24 PROCEDURE — 82553 CREATINE MB FRACTION: CPT | Performed by: NURSE PRACTITIONER

## 2022-09-24 PROCEDURE — 93010 EKG 12-LEAD: ICD-10-PCS | Mod: ,,, | Performed by: INTERNAL MEDICINE

## 2022-09-24 PROCEDURE — 63600175 PHARM REV CODE 636 W HCPCS: Performed by: NURSE PRACTITIONER

## 2022-09-24 PROCEDURE — 25000003 PHARM REV CODE 250: Performed by: NURSE PRACTITIONER

## 2022-09-24 PROCEDURE — 84484 ASSAY OF TROPONIN QUANT: CPT | Mod: 91 | Performed by: INTERNAL MEDICINE

## 2022-09-24 PROCEDURE — 85025 COMPLETE CBC W/AUTO DIFF WBC: CPT | Performed by: NURSE PRACTITIONER

## 2022-09-24 PROCEDURE — 84484 ASSAY OF TROPONIN QUANT: CPT | Performed by: NURSE PRACTITIONER

## 2022-09-24 PROCEDURE — 83735 ASSAY OF MAGNESIUM: CPT | Performed by: NURSE PRACTITIONER

## 2022-09-24 PROCEDURE — 82077 ASSAY SPEC XCP UR&BREATH IA: CPT | Performed by: NURSE PRACTITIONER

## 2022-09-24 PROCEDURE — 82746 ASSAY OF FOLIC ACID SERUM: CPT | Performed by: NURSE PRACTITIONER

## 2022-09-24 PROCEDURE — G0378 HOSPITAL OBSERVATION PER HR: HCPCS

## 2022-09-24 PROCEDURE — 80053 COMPREHEN METABOLIC PANEL: CPT | Performed by: NURSE PRACTITIONER

## 2022-09-24 PROCEDURE — 36415 COLL VENOUS BLD VENIPUNCTURE: CPT | Performed by: NURSE PRACTITIONER

## 2022-09-24 PROCEDURE — 80048 BASIC METABOLIC PNL TOTAL CA: CPT | Mod: XB | Performed by: INTERNAL MEDICINE

## 2022-09-24 PROCEDURE — 93005 ELECTROCARDIOGRAM TRACING: CPT

## 2022-09-24 PROCEDURE — 96372 THER/PROPH/DIAG INJ SC/IM: CPT | Performed by: NURSE PRACTITIONER

## 2022-09-24 PROCEDURE — 97530 THERAPEUTIC ACTIVITIES: CPT

## 2022-09-24 PROCEDURE — 25000003 PHARM REV CODE 250: Mod: ER | Performed by: FAMILY MEDICINE

## 2022-09-24 PROCEDURE — 93010 ELECTROCARDIOGRAM REPORT: CPT | Mod: ,,, | Performed by: INTERNAL MEDICINE

## 2022-09-24 PROCEDURE — 97535 SELF CARE MNGMENT TRAINING: CPT

## 2022-09-24 PROCEDURE — 92610 EVALUATE SWALLOWING FUNCTION: CPT

## 2022-09-24 PROCEDURE — 84100 ASSAY OF PHOSPHORUS: CPT | Performed by: NURSE PRACTITIONER

## 2022-09-24 RX ORDER — TALC
6 POWDER (GRAM) TOPICAL NIGHTLY PRN
Status: DISCONTINUED | OUTPATIENT
Start: 2022-09-24 | End: 2022-09-28 | Stop reason: HOSPADM

## 2022-09-24 RX ORDER — IBUPROFEN 200 MG
24 TABLET ORAL
Status: DISCONTINUED | OUTPATIENT
Start: 2022-09-24 | End: 2022-09-24

## 2022-09-24 RX ORDER — CLOPIDOGREL BISULFATE 75 MG/1
75 TABLET ORAL DAILY
Status: DISCONTINUED | OUTPATIENT
Start: 2022-09-24 | End: 2022-09-24

## 2022-09-24 RX ORDER — ENOXAPARIN SODIUM 100 MG/ML
40 INJECTION SUBCUTANEOUS EVERY 24 HOURS
Status: DISCONTINUED | OUTPATIENT
Start: 2022-09-24 | End: 2022-09-26

## 2022-09-24 RX ORDER — LABETALOL HYDROCHLORIDE 5 MG/ML
10 INJECTION, SOLUTION INTRAVENOUS
Status: DISCONTINUED | OUTPATIENT
Start: 2022-09-24 | End: 2022-09-28 | Stop reason: HOSPADM

## 2022-09-24 RX ORDER — IBUPROFEN 200 MG
16 TABLET ORAL
Status: DISCONTINUED | OUTPATIENT
Start: 2022-09-24 | End: 2022-09-28 | Stop reason: HOSPADM

## 2022-09-24 RX ORDER — FUROSEMIDE 20 MG/1
20 TABLET ORAL 2 TIMES DAILY
Qty: 60 TABLET | Refills: 0 | Status: SHIPPED | OUTPATIENT
Start: 2022-09-24 | End: 2022-10-24

## 2022-09-24 RX ORDER — CLONIDINE HYDROCHLORIDE 0.1 MG/1
0.1 TABLET ORAL
Status: COMPLETED | OUTPATIENT
Start: 2022-09-24 | End: 2022-09-24

## 2022-09-24 RX ORDER — INSULIN ASPART 100 [IU]/ML
0-5 INJECTION, SOLUTION INTRAVENOUS; SUBCUTANEOUS
Status: DISCONTINUED | OUTPATIENT
Start: 2022-09-24 | End: 2022-09-24 | Stop reason: SDUPTHER

## 2022-09-24 RX ORDER — SODIUM CHLORIDE 0.9 % (FLUSH) 0.9 %
10 SYRINGE (ML) INJECTION
Status: DISCONTINUED | OUTPATIENT
Start: 2022-09-24 | End: 2022-09-28 | Stop reason: HOSPADM

## 2022-09-24 RX ORDER — GLUCAGON 1 MG
1 KIT INJECTION
Status: DISCONTINUED | OUTPATIENT
Start: 2022-09-24 | End: 2022-09-24

## 2022-09-24 RX ORDER — GLUCAGON 1 MG
1 KIT INJECTION
Status: DISCONTINUED | OUTPATIENT
Start: 2022-09-24 | End: 2022-09-28 | Stop reason: HOSPADM

## 2022-09-24 RX ORDER — ONDANSETRON 2 MG/ML
4 INJECTION INTRAMUSCULAR; INTRAVENOUS EVERY 12 HOURS PRN
Status: DISCONTINUED | OUTPATIENT
Start: 2022-09-24 | End: 2022-09-28 | Stop reason: HOSPADM

## 2022-09-24 RX ORDER — IBUPROFEN 200 MG
24 TABLET ORAL
Status: DISCONTINUED | OUTPATIENT
Start: 2022-09-24 | End: 2022-09-28 | Stop reason: HOSPADM

## 2022-09-24 RX ORDER — INSULIN ASPART 100 [IU]/ML
0-5 INJECTION, SOLUTION INTRAVENOUS; SUBCUTANEOUS
Status: DISCONTINUED | OUTPATIENT
Start: 2022-09-24 | End: 2022-09-28 | Stop reason: HOSPADM

## 2022-09-24 RX ORDER — NAPROXEN SODIUM 220 MG/1
81 TABLET, FILM COATED ORAL DAILY
Status: DISCONTINUED | OUTPATIENT
Start: 2022-09-24 | End: 2022-09-28 | Stop reason: HOSPADM

## 2022-09-24 RX ORDER — NAPROXEN SODIUM 220 MG/1
81 TABLET, FILM COATED ORAL DAILY
Qty: 30 TABLET | Refills: 0 | Status: SHIPPED | OUTPATIENT
Start: 2022-09-24 | End: 2023-09-24

## 2022-09-24 RX ORDER — ATORVASTATIN CALCIUM 40 MG/1
40 TABLET, FILM COATED ORAL DAILY
Status: DISCONTINUED | OUTPATIENT
Start: 2022-09-24 | End: 2022-09-28 | Stop reason: HOSPADM

## 2022-09-24 RX ORDER — IBUPROFEN 200 MG
16 TABLET ORAL
Status: DISCONTINUED | OUTPATIENT
Start: 2022-09-24 | End: 2022-09-24

## 2022-09-24 RX ORDER — ATORVASTATIN CALCIUM 20 MG/1
20 TABLET, FILM COATED ORAL DAILY
Status: DISCONTINUED | OUTPATIENT
Start: 2022-09-24 | End: 2022-09-24

## 2022-09-24 RX ORDER — ACETAMINOPHEN 325 MG/1
650 TABLET ORAL EVERY 6 HOURS PRN
Status: DISCONTINUED | OUTPATIENT
Start: 2022-09-24 | End: 2022-09-28 | Stop reason: HOSPADM

## 2022-09-24 RX ADMIN — POTASSIUM BICARBONATE 50 MEQ: 978 TABLET, EFFERVESCENT ORAL at 12:09

## 2022-09-24 RX ADMIN — ENOXAPARIN SODIUM 40 MG: 100 INJECTION SUBCUTANEOUS at 05:09

## 2022-09-24 RX ADMIN — ATORVASTATIN CALCIUM 40 MG: 40 TABLET, FILM COATED ORAL at 12:09

## 2022-09-24 RX ADMIN — POTASSIUM BICARBONATE 50 MEQ: 978 TABLET, EFFERVESCENT ORAL at 03:09

## 2022-09-24 RX ADMIN — CLONIDINE HYDROCHLORIDE 0.1 MG: 0.1 TABLET ORAL at 01:09

## 2022-09-24 RX ADMIN — ASPIRIN 81 MG: 81 TABLET, CHEWABLE ORAL at 12:09

## 2022-09-24 RX ADMIN — ACETAMINOPHEN 650 MG: 325 TABLET ORAL at 08:09

## 2022-09-24 RX ADMIN — INSULIN ASPART 2 UNITS: 100 INJECTION, SOLUTION INTRAVENOUS; SUBCUTANEOUS at 05:09

## 2022-09-24 NOTE — PROGRESS NOTES
09/24/22 0500   Admission   Initial VN Admission Questions Complete   Shift   Virtual Nurse - Patient Verbalized Approval Of Camera Use;VN Rounding   Safety/Activity   Patient Rounds bed in low position;call light in patient/parent reach;clutter free environment maintained;visualized patient;placement of personal items at bedside   Safety Promotion/Fall Prevention assistive device/personal item within reach;bed alarm set;Fall Risk reviewed with patient/family;side rails raised x 2;room near unit station;/camera at bedside   Positioning   Body Position supine   Head of Bed (HOB) Positioning HOB at 30-45 degrees   VN cued in to pt's room with permission. Admission questions completed. Plan of care reviewed with pt. Pt requesting food. Bedside nurseCornelius, notified.  Call briceno w/in reach. Instructed to call for needs/assist oob.      Pt unable to verify home medications at this time. Reports her daughter has her medication list and that she will be coming. Bedside nurseCornelius, notified and VN requested that bedside nursing notify VN when pt's daughter arrives so that VN can call back in to room to review home medications with daughter

## 2022-09-24 NOTE — SUBJECTIVE & OBJECTIVE
Past Medical History:   Diagnosis Date    Arthritis     Cardiomyopathy     Depression     Diabetes mellitus     Hypertension     Lipids abnormal     Lumbar herniated disc     Obesity     RBBB     Sleep apnea        Past Surgical History:   Procedure Laterality Date    APPENDECTOMY      BACK SURGERY  ,,2017    BREAST SURGERY Bilateral     reduction    EYE SURGERY Bilateral     HIP ARTHROPLASTY Left 2020    Procedure: ARTHROPLASTY, HIP;  Surgeon: Isaiah Mccormick MD;  Location: [facility];  Service: Orthopedics;  Laterality: Left;    JOINT REPLACEMENT Right     hip    REVISION TOTAL HIP ARTHROPLASTY Left 2020    Procedure: REVISION, TOTAL ARTHROPLASTY, HIP;  Surgeon: Isaiah Mccormick MD;  Location: [facility];  Service: Orthopedics;  Laterality: Left;    TOTAL HIP ARTHROPLASTY Right     TOTAL KNEE ARTHROPLASTY Left 2019    Procedure: ARTHROPLASTY, KNEE, TOTAL;  Surgeon: Isaiah Mccormick MD;  Location: [facility];  Service: Orthopedics;  Laterality: Left;       Review of patient's allergies indicates:   Allergen Reactions    Codeine Nausea And Vomiting     Pt reports can take percocet    Hydrocodone-acetaminophen Nausea And Vomiting    Propoxyphene Nausea And Vomiting       No current facility-administered medications on file prior to encounter.     Current Outpatient Medications on File Prior to Encounter   Medication Sig    acetaminophen (TYLENOL) 650 MG TbSR Tylenol arthritis 650 mg by mouth twice daily as needed for pain.    albuterol (PROVENTIL/VENTOLIN HFA) 90 mcg/actuation inhaler albuterol sulfate HFA 90 mcg/actuation aerosol inhaler   INHALE 2 PUFFS BY MOUTH EVERY 6 HOURS AS NEEDED FOR COUGH OR WHEEZING OR SHORTNESS OF BREATH    albuterol-ipratropium (DUO-NEB) 2.5 mg-0.5 mg/3 mL nebulizer solution SMARTSI Vial(s) By Mouth Every 4-6 Hours PRN    amLODIPine (NORVASC) 10 MG tablet Take 10 mg by mouth once daily.    amoxicillin (AMOXIL) 500 MG capsule Take 500 mg by mouth every 8  (eight) hours.    aspirin 81 MG Chew Take 1 tablet (81 mg total) by mouth once daily.    baclofen (LIORESAL) 10 MG tablet Take 1 tablet (10 mg total) by mouth every evening.    benzonatate (TESSALON) 100 MG capsule Take 100 mg by mouth 3 (three) times daily as needed for Cough.    diclofenac sodium (VOLTAREN) 1 % Gel Apply 2 g topically 2 (two) times daily. To left shoulder    DULoxetine (CYMBALTA) 30 MG capsule Take 30 mg by mouth once daily.    ergocalciferol (ERGOCALCIFEROL) 50,000 unit Cap ergocalciferol (vitamin D2) 1,250 mcg (50,000 unit) capsule   TAKE 1 CAPSULE BY MOUTH 1 TIME A WEEK    furosemide (LASIX) 20 MG tablet Take 1 tablet (20 mg total) by mouth 2 (two) times a day.    gabapentin (NEURONTIN) 300 MG capsule gabapentin 300 mg capsule    glipiZIDE (GLUCOTROL) 5 MG tablet glipizide 5 mg tablet    hydroCHLOROthiazide (HYDRODIURIL) 12.5 MG Tab Take 12.5 mg by mouth once daily.    HYDROcodone-acetaminophen (NORCO)  mg per tablet hydrocodone 10 mg-acetaminophen 325 mg tablet   TAKE 1 TABLET BY MOUTH EVERY 6 TO 8 HOURS AS NEEDED FOR PAIN    levocetirizine (XYZAL) 5 MG tablet levocetirizine 5 mg tablet    losartan (COZAAR) 100 MG tablet losartan 100 mg tablet    melatonin (MELATIN) 3 mg tablet Take 2 tablets (6 mg total) by mouth nightly as needed for Insomnia.    neomycin-polymyxin-hydrocortisone (CORTISPORIN) 3.5-10,000-1 mg/mL-unit/mL-% otic suspension neomycin-polymyxin-hydrocort 3.5 mg-10,000 unit/mL-1 % ear drops,susp   SHAKE LIQUID AND INSTILL 1 DROP TO AFFECTED EAR FOUR TIMES DAILY FOR 10 DAYS    omeprazole (PRILOSEC) 20 MG capsule Take 20 mg by mouth once daily.    prednisoLONE acetate (PRED FORTE) 1 % DrpS 1 drop 4 (four) times daily.    pregabalin (LYRICA) 100 MG capsule pregabalin 100 mg capsule   TAKE ONE CAPSULE BY MOUTH TWICE DAILY    rosuvastatin (CRESTOR) 5 MG tablet rosuvastatin 5 mg tablet    traMADoL (ULTRAM) 50 mg tablet tramadol 50 mg tablet   TAKE 2 TABLETS BY MOUTH THREE TIMES  DAILY.    zolpidem (AMBIEN) 10 mg Tab zolpidem 10 mg tablet   TAKE 1 TABLET BY MOUTH EVERY DAY AT BEDTIME AS NEEDED    [DISCONTINUED] lisinopril (PRINIVIL,ZESTRIL) 40 MG tablet Take 40 mg by mouth once daily.     Family History    None       Tobacco Use    Smoking status: Never    Smokeless tobacco: Never   Substance and Sexual Activity    Alcohol use: No    Drug use: Not on file    Sexual activity: Not on file     Review of Systems   Constitutional: Negative.    HENT: Negative.     Eyes: Negative.    Respiratory: Negative.     Cardiovascular: Negative.    Gastrointestinal: Negative.    Genitourinary: Negative.    Musculoskeletal: Negative.    Skin: Negative.    Neurological:  Positive for headaches. Negative for dizziness, facial asymmetry, speech difficulty, weakness, light-headedness and numbness.   Psychiatric/Behavioral: Negative.     Objective:     Vital Signs (Most Recent):  Temp: 97.7 °F (36.5 °C) (09/24/22 0340)  Pulse: 99 (09/24/22 0340)  Resp: 20 (09/24/22 0340)  BP: (!) 134/98 (09/24/22 0340)  SpO2: 95 % (09/24/22 0340)   Vital Signs (24h Range):  Temp:  [97.7 °F (36.5 °C)-99 °F (37.2 °C)] 97.7 °F (36.5 °C)  Pulse:  [82-99] 99  Resp:  [16-24] 20  SpO2:  [94 %-98 %] 95 %  BP: (134-201)/(78-98) 134/98     Weight: 122.5 kg (270 lb)  Body mass index is 46.35 kg/m².    Physical Exam  Vitals and nursing note reviewed.   Constitutional:       Appearance: She is obese. She is not ill-appearing.   HENT:      Head: Normocephalic and atraumatic.      Mouth/Throat:      Mouth: Mucous membranes are dry.   Eyes:      Pupils: Pupils are equal, round, and reactive to light.   Cardiovascular:      Rate and Rhythm: Normal rate and regular rhythm.      Pulses: Normal pulses.      Heart sounds: Normal heart sounds. No murmur heard.  Pulmonary:      Effort: Pulmonary effort is normal. No respiratory distress.      Breath sounds: Normal breath sounds.   Abdominal:      General: Bowel sounds are normal. There is no distension.       Palpations: Abdomen is soft.   Musculoskeletal:         General: No swelling. Normal range of motion.      Cervical back: Normal range of motion.   Skin:     General: Skin is warm.      Capillary Refill: Capillary refill takes 2 to 3 seconds.   Neurological:      General: No focal deficit present.      Mental Status: She is alert. She is disoriented and confused.      Cranial Nerves: No facial asymmetry.      Sensory: No sensory deficit.      Motor: No weakness or pronator drift.      Comments: Confusion present with repeated sentences and words, patient with some expressive aphasia at times during assessment, oriented to person, place, and current president however disoriented to year and month   Psychiatric:         Mood and Affect: Mood normal.         Behavior: Behavior normal.         Thought Content: Thought content normal.         Judgment: Judgment normal.         CRANIAL NERVES     CN III, IV, VI   Pupils are equal, round, and reactive to light.     Significant Labs: All pertinent labs within the past 24 hours have been reviewed.    Significant Imaging: I have reviewed all pertinent imaging results/findings within the past 24 hours.

## 2022-09-24 NOTE — ASSESSMENT & PLAN NOTE
Patient's FSGs are uncontrolled due to hyperglycemia on current medication regimen.  Last A1c reviewed-   Lab Results   Component Value Date    HGBA1C 9.2 (H) 05/21/2022     Most recent fingerstick glucose reviewed-   Recent Labs   Lab 09/23/22  1903   POCTGLUCOSE 230*     Current correctional scale  Low  Maintain anti-hyperglycemic dose as follows-   Antihyperglycemics (From admission, onward)    None        Hold Oral hypoglycemics while patient is in the hospital.

## 2022-09-24 NOTE — PT/OT/SLP EVAL
Speech Language Pathology Evaluation  Bedside Swallow    Patient Name:  Jayne You   MRN:  [id number]  Admitting Diagnosis: Acute encephalopathy    Recommendations:                 General Recommendations:  Follow-up not indicated  Diet recommendations:  Regular, Thin   Aspiration Precautions: 1 bite/sip at a time, Alternating bites/sips, HOB to 90 degrees, Meds whole 1 at a time, Monitor for s/s of aspiration, Remain upright 30 minutes post meal, Small bites/sips, and Standard aspiration precautions   General Precautions: Standard, fall  Communication strategies:  none    History:     HPI: Jayne You is an 82-year-old female with a history of diabetes, cardiomyopathy, hypertension, Back pain, Hyperlipidemia, Arthritis, Sleep apnea, and Morbid obesity. She presented to the ED for evaluation of acute encephalopathy. She was having confusion since 3:00 p.m. yesterday when she called her son. She went to see her primary care physician yesterday morning. She was normal last at 6:00 a.m. As per family. Her son claims she called and sounded confused and did know where she was located. At 6:00 p.m. she was driving [place] when she stopped in the middle of the road. EMS was called for her. Patient claimed she was weak and not feeling well and was asking the same questions repeatedly with inability to remember things.     On arrival to ED adequate neuro exam was done and old stroke was called. NIH stroke scale 2. Patient mainly is confused and has slurring of speech. Lab work pretty unremarkable, case discussed with vascular neurologist who thought this may be stroke mimic and recommended a CTA with was without acute abnormality. CTH negative. Admit to [facility] for further care and neurology evaluation.       Past Medical History:   Diagnosis Date    Arthritis     Cardiomyopathy     Depression     Diabetes mellitus     Hypertension     Lipids abnormal     Lumbar herniated disc     Obesity     RBBB      "Sleep apnea        Past Surgical History:   Procedure Laterality Date    APPENDECTOMY      BACK SURGERY  2014,2016,2017    BREAST SURGERY Bilateral     reduction    EYE SURGERY Bilateral     HIP ARTHROPLASTY Left 11/5/2020    Procedure: ARTHROPLASTY, HIP;  Surgeon: Isaiah Mccormick MD;  Location: [facility];  Service: Orthopedics;  Laterality: Left;    JOINT REPLACEMENT Right 2006    hip    REVISION TOTAL HIP ARTHROPLASTY Left 11/30/2020    Procedure: REVISION, TOTAL ARTHROPLASTY, HIP;  Surgeon: Isaiah Mccormick MD;  Location: [facility];  Service: Orthopedics;  Laterality: Left;    TOTAL HIP ARTHROPLASTY Right 2006    TOTAL KNEE ARTHROPLASTY Left 7/18/2019    Procedure: ARTHROPLASTY, KNEE, TOTAL;  Surgeon: Isaiah Mccormick MD;  Location: [facility];  Service: Orthopedics;  Laterality: Left;       Social History: Patient lives alone.    Prior Intubation HX:  none this admit     Modified Barium Swallow: none per EMR    Chest X-Rays: 9/24 - No acute abnormality.    Prior diet: Regular/thin    Occupation/hobbies/homemaking: watching TV and being with friends/family     Subjective     RN approved ST for evaluation. ST entered the patients room with the patient laying in bed, talking on cell phone. The patient was able to greet ST and was agreeable to evaluation.   Patient goals: "I need help at home"    Pain/Comfort:  Pain Rating 1: 0/10    Respiratory Status: Room air    Objective:     Oral Musculature Evaluation  Oral Musculature: WFL  Dentition: present and adequate  Secretion Management: adequate  Mucosal Quality: good  Mandibular Strength and Mobility: WFL  Oral Labial Strength and Mobility: WFL  Lingual Strength and Mobility: WFL  Velar Elevation: WFL  Volitional Cough: intact  Volitional Swallow: intact  Voice Prior to PO Intake: audible, clear    Bedside Swallow Eval:   Consistencies Assessed:  Thin liquids x5 sips of water   Soft solids x3 bites of potatoes and carrots from lunch tray   Solids x3 bites of chicken " from lunch tray       Oral Phase:   WFL    Pharyngeal Phase:   no overt clinical signs/symptoms of aspiration  no overt clinical signs/symptoms of pharyngeal dysphagia    Compensatory Strategies  None    Treatment: Patient was able to consume all items on regular/thin lunch tray without overt s/s of aspiration. ST recommending patient continue current diet.     Patient AAOx4, accurately named all items, followed all commands and appropriately answered awareness questions.     ST completed extensive education with the patient on SLP role, POC, diagnostic information, importance of utilizing safe swallowing precautions and all results/recs. The patient verbalized good understanding. ST allowed time for all of the patients questions be asked/answered that were within ST's scope. ST notified RN of all results/recs.       Assessment:     Jayne You is a 82 y.o. female admitted with weakness. Patient is at baseline for speech, language, cognition and swallowing. Patient is safe to continue a regular/thin diet. ST will sign off.     Goals:   Multidisciplinary Problems       SLP Goals       Not on file              Multidisciplinary Problems (Resolved)          Problem: SLP    Goal Priority Disciplines Outcome   SLP Goal   (Resolved)     SLP Met   Description: Goals:   1. Patient will successfully participate in clinical swallow evaluation and tolerate po trials with no overt s/s of aspiration.                         Plan:     Plan of Care expires:  09/24/22  Plan of Care reviewed with:    patient  SLP Follow-Up:     NO     Discharge recommendations:   (no further ST needed)   Barriers to Discharge:  Decreased Care Giver Support    Time Tracking:     SLP Treatment Date:   09/24/22  Speech Start Time:  1210  Speech Stop Time:  1223     Speech Total Time (min):  13 min    Billable Minutes: Eval Swallow and Oral Function 13    09/24/2022     Hector Hendrickson M.S., CCC-SLP   Speech Language Pathologist

## 2022-09-24 NOTE — PT/OT/SLP EVAL
"Occupational Therapy   Evaluation    Name: Jayne You  MRN: [id number]  Admitting Diagnosis:  Acute encephalopathy  Recent Surgery: * No surgery found *      Recommendations:     Discharge Recommendations: nursing facility, skilled  Discharge Equipment Recommendations:   (TBD)  Barriers to discharge:  Decreased caregiver support    Assessment:     Jayne You is a 82 y.o. female with a medical diagnosis of Acute encephalopathy.  She presents with deconditioning. Performance deficits affecting function: weakness, impaired endurance, impaired self care skills, impaired functional mobility, gait instability, impaired balance, decreased lower extremity function, decreased upper extremity function, impaired cognition, decreased safety awareness, decreased ROM.      Rehab Prognosis: Good; patient would benefit from acute skilled OT services to address these deficits and reach maximum level of function.       Plan:     Patient to be seen 3 x/week to address the above listed problems via self-care/home management, therapeutic activities, therapeutic exercises  Plan of Care Expires:    Plan of Care Reviewed with: patient    Subjective     Chief Complaint: Pt reports that it is getting harder to take care of herself. Pt stated, "I'm doing all that but barely" in regards to ADLs.  Pt states that she got lost going home from PT and stated, "They thought I had a heart attack."  Patient/Family Comments/goals: To return to PLOF    Occupational Profile:  Living Environment: Pt lives alone in [facility], [address] [facility], [facility] and tub/; uses [place] in [facility]  Previous level of function: Mod I ADLs and functional mobility but reports increased difficulty in caring for self  Roles and Routines: Caretaker to self and home. Cooks while seated on rollator, cleans, drives, orders groceries for delivery as she states she cannot  gorcery orders. Pt reports that she drives self to PT   Equipment Used at Home:  cane, quad, bedside commode, walker, " rolling, rollator, shower chair, none (hurry cane)  Assistance upon Discharge: Limited     Pain/Comfort:  Pain Rating 1: 0/10    Patients cultural, spiritual, Voodoo conflicts given the current situation:      Objective:     Communicated with: nsg prior to session.  Patient found HOB elevated with bed alarm, telemetry, peripheral IV, PureWick upon OT entry to room.    General Precautions: Standard, fall   Orthopedic Precautions:N/A   Braces: N/A  Respiratory Status: Room air    Occupational Performance:    Bed Mobility:    Patient completed Rolling/Turning to Left with  moderate assistance  Patient completed Scooting/Bridging with minimum assistance and moderate assistance  Patient completed Supine to Sit with moderate assistance  Patient completed Sit to Supine with minimum assistance    Functional Mobility/Transfers:  Patient completed Sit <> Stand Transfer with contact guard assistance and minimum assistance  with  hand-held assist and rolling walker   Functional Mobility: Pt with fair dynamic seated and standing balance.     Activities of Daily Living:  Upper Body Dressing: moderate assistance to don/doff gown as robe and to don/doff gown seated EOB  Lower Body Dressing: minimum assistance to don/doff B socks seated EOB    Cognitive/Visual Perceptual:  Cognitive/Psychosocial Skills:     -       Oriented to: Person, Place, Time and Situation   -       Follows Commands/attention:Follows multistep  commands  -       Communication: clear/fluent  -       Memory: No Deficits noted  -       Safety awareness/insight to disability: intact   -       Mood/Affect/Coping skills/emotional control: Appropriate to situation     Physical Exam:  Postural examination/scapula alignment:    -       No postural abnormalities identified  Skin integrity: Visible skin intact  Sensation:    -       Intact  Motor Planning:    -       WFL  Dominant hand:    -       R handed  Upper Extremity Range of Motion: BUE WFL except B shoulder  flexion ~0-90*    Upper Extremity Strength:  BUE grossly 2+ to 3+/5   Strength:  B hands WFL  Fine Motor Coordination:    -       Intact  Gross motor coordination:   WFL     AMPAC 6 Click ADL:  AMPAC Total Score: 19    Treatment & Education:  Pt educated on role of OT and POC.   Pt performing skills as listed above.     Patient left HOB elevated with all lines intact, call button in reach, bed alarm on, and nsg notified    GOALS:   Multidisciplinary Problems       Occupational Therapy Goals          Problem: Occupational Therapy    Goal Priority Disciplines Outcome Interventions   Occupational Therapy Goal     OT, PT/OT Ongoing, Progressing    Description: Goals to be met by: 10/24/2022      Patient will increase functional independence with ADLs by performing:    UE Dressing with Supervision.  LE Dressing with Supervision.  Grooming while standing with Supervision.  Toileting from toilet with Supervision for hygiene and clothing management.   Toilet transfer to toilet with Supervision.  Increased functional strength to WFL for self care.  Upper extremity exercise program x10 reps per handout, with independence.                          History:     Past Medical History:   Diagnosis Date    Arthritis     Cardiomyopathy     Depression     Diabetes mellitus     Hypertension     Lipids abnormal     Lumbar herniated disc     Obesity     RBBB     Sleep apnea          Past Surgical History:   Procedure Laterality Date    APPENDECTOMY      BACK SURGERY  2014,2016,2017    BREAST SURGERY Bilateral     reduction    EYE SURGERY Bilateral     HIP ARTHROPLASTY Left 11/5/2020    Procedure: ARTHROPLASTY, HIP;  Surgeon: Isaiah Mccormick MD;  Location: [facility];  Service: Orthopedics;  Laterality: Left;    JOINT REPLACEMENT Right 2006    hip    REVISION TOTAL HIP ARTHROPLASTY Left 11/30/2020    Procedure: REVISION, TOTAL ARTHROPLASTY, HIP;  Surgeon: Isaiah Mccormick MD;  Location: [facility];  Service: Orthopedics;  Laterality:  Left;    TOTAL HIP ARTHROPLASTY Right 2006    TOTAL KNEE ARTHROPLASTY Left 7/18/2019    Procedure: ARTHROPLASTY, KNEE, TOTAL;  Surgeon: Isaiah Mccormick MD;  Location: [facility];  Service: Orthopedics;  Laterality: Left;       Time Tracking:     OT Date of Treatment: 09/24/22  OT Start Time: 1621  OT Stop Time: 1652  OT Total Time (min): 31 min    Billable Minutes:Evaluation 16  Self Care/Home Management 15    9/24/2022

## 2022-09-24 NOTE — ASSESSMENT & PLAN NOTE
-Evaluated by vascular neurology for stroke  -Per neurologist: Confusion with no focal symptoms. Likely stroke mimic. Obtain CTA  -CTA: No acute abnormality. No high-grade stenosis or major vessel occlusion.  -unsure etiology  -CBG level 230  -CT head negative  -Labs reviewed and noted  -UA & UDS negative  -TSH 1.940  B12, folate, ETOH, ammonia pending  -Avoid anticholinergics if possible  -Delirium precautions  -consult neurology

## 2022-09-24 NOTE — ASSESSMENT & PLAN NOTE
-rule out stroke  -CTA brain negative  -CTH negative  -MRI pending  -ECHO pending  Antithrombotics for secondary stroke prevention: Antiplatelets: Aspirin: 81 mg daily    Statins for secondary stroke prevention and hyperlipidemia, if present:   Statins: Atorvastatin- 40 mg daily    Aggressive risk factor modification: HTN, DM, HLD, Obesity     Rehab efforts: The patient has been evaluated by a stroke team provider and the therapy needs have been fully considered based off the presenting complaints and exam findings. The following therapy evaluations are needed: None    Diagnostics ordered/pending: CTA Head to assess vasculature , HgbA1C to assess blood glucose levels, Lipid Profile to assess cholesterol levels, MRI head without contrast to assess brain parenchyma, TTE to assess cardiac function/status , TSH to assess thyroid function    VTE prophylaxis: Enoxaparin 40 mg SQ every 24 hours    BP parameters: Infarct: No intervention, SBP <220

## 2022-09-24 NOTE — ED PROVIDER NOTES
Encounter Date: 9/23/2022       History     Chief Complaint   Patient presents with    Weakness     Brought in by ems. Pt was driving her car stopped in [facility] due to feeling weak. Bystander called medics. Pt is confused to time and has some slurred speech. Equal hand grasps.      82-year-old female has been having confusion since 3:00 p.m. when she called her son.  She went to see her primary care physician this morning.  She was normal at the accident 6:00 a.m..  As per family.  Her son claims she called the p.m. and sounded confused and did know where she was located.  At 6:00 p.m. she was driving [place] when she stopped in the middle of the road.  EMS was called for.  Patient claims she is weak and not feeling well.  Asking same questions repeatedly and was unable to remember things.  On arrival to ED adequate neuro exam was done and old stroke was called.  NIH stroke scale2   Patient mainly is confused and has slurring of speech.  Accu-Chek 384 as per EMS.  Patient received on site candy from bystanders.  She has history of diabetes, cardiomyopathy, hypertension.  Back pain.  Hyperlipidemia.  Arthritis.  Sleep apnea.  Morbid obesity.    The history is provided by the patient, the EMS personnel and a relative.   Review of patient's allergies indicates:   Allergen Reactions    Codeine Nausea And Vomiting     Pt reports can take percocet    Hydrocodone-acetaminophen Nausea And Vomiting    Propoxyphene Nausea And Vomiting     Past Medical History:   Diagnosis Date    Arthritis     Cardiomyopathy     Depression     Diabetes mellitus     Hypertension     Lipids abnormal     Lumbar herniated disc     Obesity     RBBB     Sleep apnea      Past Surgical History:   Procedure Laterality Date    APPENDECTOMY      BACK SURGERY  2014,2016,2017    BREAST SURGERY Bilateral     reduction    EYE SURGERY Bilateral     HIP ARTHROPLASTY Left 11/5/2020    Procedure: ARTHROPLASTY, HIP;  Surgeon: Isaiah Mccormick MD;  Location:  [facility] OR;  Service: Orthopedics;  Laterality: Left;    JOINT REPLACEMENT Right 2006    hip    REVISION TOTAL HIP ARTHROPLASTY Left 11/30/2020    Procedure: REVISION, TOTAL ARTHROPLASTY, HIP;  Surgeon: sIaiah Mccormick MD;  Location: [facility] OR;  Service: Orthopedics;  Laterality: Left;    TOTAL HIP ARTHROPLASTY Right 2006    TOTAL KNEE ARTHROPLASTY Left 7/18/2019    Procedure: ARTHROPLASTY, KNEE, TOTAL;  Surgeon: Isaiah Mccormick MD;  Location: [facility] OR;  Service: Orthopedics;  Laterality: Left;     No family history on file.  Social History     Tobacco Use    Smoking status: Never    Smokeless tobacco: Never   Substance Use Topics    Alcohol use: No     Review of Systems   Constitutional:  Positive for activity change. Negative for appetite change, chills and fever.   HENT:  Negative for congestion, ear discharge, rhinorrhea, sinus pressure, sinus pain, sore throat and trouble swallowing.    Eyes:  Negative for photophobia, pain, discharge, redness, itching and visual disturbance.   Respiratory:  Negative for cough, chest tightness, shortness of breath and wheezing.    Cardiovascular:  Negative for chest pain, palpitations and leg swelling.   Gastrointestinal:  Negative for abdominal distention, abdominal pain, constipation, diarrhea, nausea and vomiting.   Genitourinary:  Negative for dysuria, flank pain, frequency and hematuria.   Musculoskeletal:  Negative for back pain, gait problem, neck pain and neck stiffness.   Skin:  Negative for rash and wound.   Neurological:  Positive for speech difficulty and weakness. Negative for dizziness, tremors, seizures, syncope, facial asymmetry, light-headedness, numbness and headaches.   Psychiatric/Behavioral:  Negative for behavioral problems, confusion, hallucinations and sleep disturbance. The patient is not nervous/anxious.    All other systems reviewed and are negative.    Physical Exam     Initial Vitals   BP Pulse Resp Temp SpO2   09/23/22 1907 09/23/22 1907 09/23/22  1907 09/23/22 1915 09/23/22 1907   (!) 169/86 84 16 99 °F (37.2 °C) 96 %      MAP       --                Physical Exam    Nursing note and vitals reviewed.  Constitutional: Vital signs are normal. She appears well-developed and well-nourished. She is active. No distress.   HENT:   Head: Normocephalic.   Nose: Nose normal.   Mouth/Throat: Oropharynx is clear and moist and mucous membranes are normal.   Eyes: Conjunctivae, EOM and lids are normal. Pupils are equal, round, and reactive to light.   Neck: Neck supple.   Normal range of motion.  Cardiovascular:  Normal rate, regular rhythm, S1 normal, S2 normal and normal heart sounds.           Pulmonary/Chest: Breath sounds normal. No respiratory distress. She has no wheezes. She has no rhonchi. She has no rales. She exhibits no tenderness.   Abdominal: Abdomen is soft. Bowel sounds are normal. She exhibits no distension. There is no abdominal tenderness. There is no rebound.   Musculoskeletal:         General: Normal range of motion.      Right upper arm: Normal.      Left upper arm: Normal.      Cervical back: Normal range of motion and neck supple.      Right lower leg: Normal.      Left lower leg: Normal.     Neurological: She is alert. She has normal strength. She is disoriented. She displays normal reflexes. No cranial nerve deficit or sensory deficit. GCS score is 15. GCS eye subscore is 4. GCS verbal subscore is 4. GCS motor subscore is 6.   Skin: Skin is warm. Capillary refill takes less than 2 seconds.   Psychiatric: She has a normal mood and affect. Her speech is normal and behavior is normal. Thought content normal. Cognition and memory are normal.       ED Course   Critical Care    Date/Time: 9/23/2022 8:50 PM  Performed by: Marco Agarwal MD  Authorized by: Geovanni Navarro MD   Direct patient critical care time: 10 minutes  Additional history critical care time: 10 minutes  Ordering / reviewing critical care time: 10 minutes  Documentation critical  care time: 10 minutes  Consulting other physicians critical care time: 10 minutes  Consult with family critical care time: 10 minutes  Other critical care time: 10 minutes  Total critical care time (exclusive of procedural time) : 70 minutes  Critical care was necessary to treat or prevent imminent or life-threatening deterioration of the following conditions: CNS failure or compromise.  Critical care was time spent personally by me on the following activities: review of old charts, re-evaluation of patient's condition, pulse oximetry, ordering and review of radiographic studies, ordering and review of laboratory studies, ordering and performing treatments and interventions, obtaining history from patient or surrogate, examination of patient, evaluation of patient's response to treatment and discussions with consultants.      Labs Reviewed   CBC W/ AUTO DIFFERENTIAL - Abnormal; Notable for the following components:       Result Value    RBC 6.13 (*)     Hematocrit 50.7 (*)     MCH 24.8 (*)     MCHC 30.0 (*)     RDW 15.6 (*)     All other components within normal limits   COMPREHENSIVE METABOLIC PANEL - Abnormal; Notable for the following components:    CO2 34 (*)     Glucose 244 (*)     All other components within normal limits   URINALYSIS, REFLEX TO URINE CULTURE - Abnormal; Notable for the following components:    Protein, UA Trace (*)     Ketones, UA Trace (*)     Bilirubin (UA) 1+ (*)     All other components within normal limits    Narrative:     Preferred Collection Type->Urine, Clean Catch  Specimen Source->Urine  Collection Type->Urine, Clean Catch   POCT GLUCOSE - Abnormal; Notable for the following components:    POCT Glucose 230 (*)     All other components within normal limits   PROTIME-INR   TSH   DRUG SCREEN PANEL, URINE EMERGENCY    Narrative:     Preferred Collection Type->Urine, Clean Catch  Specimen Source->Urine  Collection Type->Urine, Clean Catch   SARS-COV-2 RNA AMPLIFICATION, QUAL     Narrative:     Is the patient symptomatic?->Yes   POCT GLUCOSE, HAND-HELD DEVICE     EKG Readings: (Independently Interpreted)   Initial Reading: No STEMI. Rhythm: Normal Sinus Rhythm. Heart Rate: 94. Ectopy: No Ectopy PVCs. Conduction: RBBB. ST Segments: Normal ST Segments. T Waves: Normal. Clinical Impression: Normal Sinus Rhythm     Imaging Results              CTA Brain (Final result)  Result time 09/23/22 21:06:51      Final result by Rafita Metzger MD (09/23/22 21:06:51)                   Impression:      No acute abnormality. No high-grade stenosis or major vessel occlusion.    COMMUNICATION  This critical result was discovered/received at 20:45.  The critical information above was relayed directly by me by telephone to Dr. Agarwal With the emergency department on 09/23/2022 at 20:48.    All CT scans at this facility are performed  using dose modulation techniques as appropriate to performed exam including the following:  automated exposure control; adjustment of mA and/or kV according to the patients size (this includes techniques or standardized protocols for targeted exams where dose is matched to indication/reason for exam: i.e. extremities or head);  iterative reconstruction technique.      Electronically signed by: Rafita Metzger  Date:    09/23/2022  Time:    21:06               Narrative:    EXAMINATION:  CTA HEAD    CLINICAL HISTORY:  Memory loss;    TECHNIQUE:  CT angiogram was performed from the level of the bottom of C2 to the top of the head following the IV administration of 100 mL of Omnipaque 350.   maximum intensity projection reconstructions were performed.  Coronal and sagittal reformats were utilized.    COMPARISON:  None    FINDINGS:  Skull/Extracranial Contents (limited evaluation): No fracture. Mastoid air cells and paranasal sinuses are essentially clear.    Intracranial Arteries: Anterior circulation: MCA demonstrates no focal high-grade stenosis, occlusion, or aneurysm.    CHANTAL  demonstrates no focal high-grade stenosis, occlusion or aneurysm.  There is a common trunk of the CHANTAL which arises predominantly through the right left CHANTAL is slightly diminutive proximally.    Posterior circulation: Intracranial vertebral, basilar, and posterior cerebral arteries are intact without focal high-grade stenosis, occlusion, or aneurysm.    Venous structures (limited evaluation): Normal.                                       CT Head Without Contrast (Final result)  Result time 09/23/22 18:58:47      Final result by Rafita Metzger MD (09/23/22 18:58:47)                   Impression:      No acute intracranial CT abnormality on this motion degraded exam.    Similar senescent changes.    All CT scans at this facility are performed  using dose modulation techniques as appropriate to performed exam including the following:  automated exposure control; adjustment of mA and/or kV according to the patients size (this includes techniques or standardized protocols for targeted exams where dose is matched to indication/reason for exam: i.e. extremities or head);  iterative reconstruction technique.      Electronically signed by: Rafita Metzger  Date:    09/23/2022  Time:    18:58               Narrative:    EXAMINATION:  CT HEAD WITHOUT CONTRAST    CLINICAL HISTORY:  Mental status change, unknown cause;    TECHNIQUE:  Low dose axial CT images obtained throughout the head without intravenous contrast. Sagittal and coronal reconstructions were performed.    COMPARISON:  Multiple priors    FINDINGS:  Intracranial compartment:    Motion degrades the exam.    Ventricles and sulci are normal in size for age without evidence of hydrocephalus. No extra-axial blood or fluid collections.    Mild microvascular ischemic change.  Remote bilateral basal ganglia lacunar infarcts.  No parenchymal mass, hemorrhage, edema or major vascular distribution infarct.    Skull/extracranial contents (limited evaluation): No fracture.  Mastoid air cells and paranasal sinuses are essentially clear.                                       Medications   sodium chloride 0.9% flush 10 mL (has no administration in time range)   sodium chloride 0.9% bolus 500 mL (has no administration in time range)   labetaloL injection 10 mg (has no administration in time range)   enoxaparin injection 40 mg (has no administration in time range)   ondansetron injection 4 mg (has no administration in time range)   acetaminophen tablet 650 mg (has no administration in time range)   sodium chloride 0.9% flush 10 mL (has no administration in time range)   melatonin tablet 6 mg (has no administration in time range)   iohexoL (OMNIPAQUE 350) injection 100 mL (100 mLs Intravenous Given 9/23/22 2036)   ketorolac injection 15 mg (15 mg Intravenous Given 9/23/22 2258)   amLODIPine tablet 10 mg (10 mg Oral Given 9/23/22 2257)   cloNIDine tablet 0.1 mg (0.1 mg Oral Given 9/24/22 0137)     Medical Decision Making:   Initial Assessment:   82-year-old female with confusion and slurring of speech.  No facial deviation.  Upper and lower limbs with equal power.  Generalized weakness.  Stroke mimic in symptoms.  Code stroke initiated.  Differential Diagnosis:   UTI, medication side effects, delirium, CVA, TIA. Hypertension encephalopathy  Clinical Tests:   Lab Tests: Ordered and Reviewed  Radiological Study: Ordered and Reviewed  Medical Tests: Ordered and Reviewed  ED Management:  Tele stroke consult initiated.  Since symptoms of stroke moving advised for CTA.  No acute findings on CT or CTA of head  Patient continues to have confusion unknown if this is secondary to delirium, narcotic withdrawal as patient did not take any medication since this morning.  Elevated blood pressure since she is has not taken medication give her Norvasc in ED.  Observation for confusion of unknown origin.  Discussed with hospitalist accepted patient for observation.                        Clinical Impression:    Final diagnoses:  [I63.9] Stroke  [R41.82] Altered mental status, unspecified altered mental status type (Primary)  [I10] Primary hypertension        ED Disposition Condition    Observation Stable                Marco Agarwal MD  09/24/22 [id number]       Marco Agarwal MD  09/24/22 [id number]

## 2022-09-24 NOTE — PLAN OF CARE
Problem: SLP  Goal: SLP Goal  Description: Goals:   1. Patient will successfully participate in clinical swallow evaluation and tolerate po trials with no overt s/s of aspiration.    Outcome: Met   9/24/2022: BSE completed. Patient safe to continue a regular/thin diet. Speech, lang, cog, swallowing at baseline. ST will sign off.   Hector Hendrickson M.S., CCC-SLP   Speech Language Pathologist

## 2022-09-24 NOTE — HPI
Jayne You is an 82-year-old female with a history of diabetes, cardiomyopathy, hypertension, Back pain, Hyperlipidemia, Arthritis, Sleep apnea, and Morbid obesity. She presented to the ED for evaluation of acute encephalopathy. She was having confusion since 3:00 p.m. yesterday when she called her son. She went to see her primary care physician yesterday morning. She was normal last at 6:00 a.m. As per family. Her son claims she called and sounded confused and did know where she was located. At 6:00 p.m. she was driving [place] when she stopped in the middle of the road. EMS was called for her. Patient claimed she was weak and not feeling well and was asking the same questions repeatedly with inability to remember things.    On arrival to ED adequate neuro exam was done and old stroke was called. NIH stroke scale 2. Patient mainly is confused and has slurring of speech. Lab work pretty unremarkable, case discussed with vascular neurologist who thought this may be stroke mimic and recommended a CTA with was without acute abnormality. CTH negative. Admit to [facility] for further care and neurology evaluation.

## 2022-09-24 NOTE — PLAN OF CARE
Problem: Occupational Therapy  Goal: Occupational Therapy Goal  Description: Goals to be met by: 10/24/2022      Patient will increase functional independence with ADLs by performing:    UE Dressing with Supervision.  LE Dressing with Supervision.  Grooming while standing with Supervision.  Toileting from toilet with Supervision for hygiene and clothing management.   Toilet transfer to toilet with Supervision.  Increased functional strength to WFL for self care.  Upper extremity exercise program x10 reps per handout, with independence.     Outcome: Ongoing, Progressing   Pt would benefit from continued OT to address deficits in self care and functional mobility. Recommending SNF; DME needs TBD

## 2022-09-24 NOTE — CONSULTS
[facility] NEUROLOGY CONSULT  EVALUATION    Reason for consult:  Acute encephalopathy  Informant:  Primary Team    Other sources of information : Patient, chart review    CC:  Weakness (Brought in by ems. Pt was driving her car stopped in [facility] due to feeling weak. Bystander called medics. Pt is confused to time and has some slurred speech. Equal hand grasps. )       HPI: Jayne You is a 82 y.o. right handed female with  has a past medical history of Arthritis, Cardiomyopathy, Depression, Diabetes mellitus, Hypertension, Lipids abnormal, Lumbar herniated disc, Obesity, RBBB, and Sleep apnea. who presents for increasing confusion x 1 day.   Patient states she had recently received a injection to her back for back pain when she went to see her primary care. As the day progressed she started to exhibit increasing confusion while driving , stating she missed her exit when driving in [place]. Additionally states she stopped driving in the middle of the road as she wasn't feeling well. Bystanders had included a nurse who was able to get her some sweetened tea after which she states she mildly improved. EMS was called for her as she wasn't fully oriented and not completely at her baseline. Patient states she has had similar episodes in the past which resolved with glucose correction.    per EMS. BP elevated  169/94 on arrival.  CTH on arrival with no acute intracranial abnormalities. She continues to take ASA 81 mg daily. Telestroke was consulted with low concern for stroke. No TPA was administered.       Outpatient Neurologist: None          ROS:   12pt ROS negative other then mentioned above    Histories:     Allergies:  Codeine, Hydrocodone-acetaminophen, and Propoxyphene    Current Medications:    Current Facility-Administered Medications   Medication Dose Route Frequency Provider Last Rate Last Admin    acetaminophen tablet 650 mg  650 mg Oral Q6H PRN Anna Teran NP        aspirin chewable tablet 81 mg   81 mg Oral Daily Anna Teran, EUGENIO        atorvastatin tablet 40 mg  40 mg Oral Daily Anna Teran, NP        dextrose 10% bolus 125 mL  12.5 g Intravenous PRN Anna Teran NP        dextrose 10% bolus 250 mL  25 g Intravenous PRN Anna Teran, NP        enoxaparin injection 40 mg  40 mg Subcutaneous Daily Anna Teran, NP        glucagon (human recombinant) injection 1 mg  1 mg Intramuscular PRN Anna Teran, NP        glucose chewable tablet 16 g  16 g Oral PRN Anna Teran, NP        glucose chewable tablet 24 g  24 g Oral PRN Anna Teran, EUGENIO        insulin aspart U-100 pen 0-5 Units  0-5 Units Subcutaneous QID (AC + HS) PRN Anna Teran, EUGENIO        labetaloL injection 10 mg  10 mg Intravenous Q15 Min PRN Anna Teran NP        melatonin tablet 6 mg  6 mg Oral Nightly PRN Marco Agarwal MD        ondansetron injection 4 mg  4 mg Intravenous Q12H PRN Anna Teran, NP        potassium bicarbonate disintegrating tablet 50 mEq  50 mEq Oral Q2H Geovanni Navarro MD        sodium chloride 0.9% bolus 500 mL  500 mL Intravenous Continuous PRN Anna Teran NP        sodium chloride 0.9% flush 10 mL  10 mL Intravenous PRN Anna Teran NP        sodium chloride 0.9% flush 10 mL  10 mL Intravenous PRN Marco Agarwal MD             Past Medical/Surgical/Family/Social History:  PMHx:   Past Medical History:   Diagnosis Date    Arthritis     Cardiomyopathy     Depression     Diabetes mellitus     Hypertension     Lipids abnormal     Lumbar herniated disc     Obesity     RBBB     Sleep apnea       Surgeries:   Past Surgical History:   Procedure Laterality Date    APPENDECTOMY      BACK SURGERY  2014,2016,2017    BREAST SURGERY Bilateral     reduction    EYE SURGERY Bilateral     HIP ARTHROPLASTY Left 11/5/2020    Procedure: ARTHROPLASTY, HIP;  Surgeon: Isaiah Mccormick MD;  Location: [facility];  Service: Orthopedics;  Laterality:  Left;    JOINT REPLACEMENT Right 2006    hip    REVISION TOTAL HIP ARTHROPLASTY Left 11/30/2020    Procedure: REVISION, TOTAL ARTHROPLASTY, HIP;  Surgeon: Isaiah Mccormick MD;  Location: [facility];  Service: Orthopedics;  Laterality: Left;    TOTAL HIP ARTHROPLASTY Right 2006    TOTAL KNEE ARTHROPLASTY Left 7/18/2019    Procedure: ARTHROPLASTY, KNEE, TOTAL;  Surgeon: Isaiah Mccormick MD;  Location: [facility];  Service: Orthopedics;  Laterality: Left;      Family  Hx: No family history on file.   Social Hx:   Social History     Tobacco Use    Smoking status: Never    Smokeless tobacco: Never   Substance Use Topics    Alcohol use: No         Current Evaluation:     Vital Signs:   Vitals:    09/24/22 0900   BP:    Pulse: 103   Resp: 20   Temp:         General Exam  No apparent distress  Orientation  Alert, awake, oriented to self, place, and situation. States its 2024   Memory  Recent and remote memory grossly intact.  Language  Fluent speech. No dysarthria, No aphasia.   Cranial Nerves  PERRL, VF intact, EOMI, V1-V3 intact, symmetric facial expression, hearing grossly intact, SCM & TPZ 5/5, tongue midline, symmetric palate elevation.  Motor  Normal Bulk, Normal Tone  Right Upper Extremity: Normal 5/5 strength  Left Upper Extremity: Normal 5/5 strength  Right Lower Extremity: Normal 5/5 strength  Left Lower Extremity: Normal 5/5 strength  Sensory  Normal to light touch throughout  Normal vibration  [place] DOT  DTR  Upper Extremities:  +2/4, symmetric  Lower Extremities: Patellar and Achilles +2/4 and symmetric  Cerebellar/Gait  Normal finger to nose and heel to shin.   DOT gait and stance.     NIHSS ([employer] [place] [employer] Stroke Scale)   1a  Level of consciousness: 0=alert; keenly responsive   1b. LOC questions:  0 = Answers both questions correctly   1c. LOC commands: 0=Answers both tasks correctly   2.  Best Gaze: 0=normal   3. Visual: 0=No visual loss   4. Facial Palsy: 0=Normal symmetric movement   5a.  Motor left arm: 0=No drift, limb holds 90 (or 45) degrees for full 10 seconds   5b.  Motor right arm: 0=No drift, limb holds 90 (or 45) degrees for full 10 seconds   6a. motor left le=No drift, limb holds 90 (or 45) degrees for full 10 seconds   6b  Motor right le=No drift, limb holds 90 (or 45) degrees for full 10 seconds   7. Limb Ataxia: 0=Absent   8.  Sensory: 0=Normal; no sensory loss   9. Best Language:  0=No aphasia, normal   10. Dysarthria: 0=Normal   11. Extinction and Inattention: 0=No abnormality         Total:   0         LABORATORY STUDIES:  Labs:  Recent Labs   Lab 22  0356   WBC 4.93 5.43   HGB 15.2 15.0   HCT 50.7* 50.3*    150   MCV 83 83       Recent Labs   Lab 22  0403    143   K 3.5 2.8*   CL 99 99   CO2 34* 28   BUN 13 9   * 202*   CALCIUM 9.6 9.9   PROT 7.6 7.7   ALBUMIN 4.4 3.9   BILITOT 0.9 0.7   AST 34 21   ALKPHOS 99 96   ALT 27 19       Recent Labs   Lab 22  2200   INR 1.1       Recent Labs   Lab 22  0403   * 202*       Urine:   Lab Results   Component Value Date    SPECGRAV 1.010 2022    NITRITE Negative 2022    KETONESU Trace (A) 2022    UROBILINOGEN Negative 2022    WBCUA 0 2018       Recent Labs   Lab 22  0403   LDLCALC 79.4       Thyroid:   Recent Labs   Lab 22   TSH 1.940       FLP:   Recent Labs   Lab 22  0403   CHOL 150   HDL 50   LDLCALC 79.4   TRIG 103   CHOLHDL 33.3       Cardiac markers:  Recent Labs   Lab 22  0356   TROPONINI 0.039*       RADIOLOGY STUDIES:    MRI Brain   1. Within limitations of a motion compromised examination, no definite acute intracranial findings are identified.  2. No evidence of acute ischemia or recent infarction, as questioned.  3. Additional details, as provided in the body of the report.    Imaging Results              CTA Brain (Final result)  Result time 22 21:06:51      Final  result by Rafita Metzger MD (09/23/22 21:06:51)                   Impression:      No acute abnormality. No high-grade stenosis or major vessel occlusion.    COMMUNICATION  This critical result was discovered/received at 20:45.  The critical information above was relayed directly by me by telephone to Dr. Agarwal With the emergency department on 09/23/2022 at 20:48.    All CT scans at this facility are performed  using dose modulation techniques as appropriate to performed exam including the following:  automated exposure control; adjustment of mA and/or kV according to the patients size (this includes techniques or standardized protocols for targeted exams where dose is matched to indication/reason for exam: i.e. extremities or head);  iterative reconstruction technique.      Electronically signed by: Rafita Metzger  Date:    09/23/2022  Time:    21:06               Narrative:    EXAMINATION:  CTA HEAD    CLINICAL HISTORY:  Memory loss;    TECHNIQUE:  CT angiogram was performed from the level of the bottom of C2 to the top of the head following the IV administration of 100 mL of Omnipaque 350.   maximum intensity projection reconstructions were performed.  Coronal and sagittal reformats were utilized.    COMPARISON:  None    FINDINGS:  Skull/Extracranial Contents (limited evaluation): No fracture. Mastoid air cells and paranasal sinuses are essentially clear.    Intracranial Arteries: Anterior circulation: MCA demonstrates no focal high-grade stenosis, occlusion, or aneurysm.    CHANTAL demonstrates no focal high-grade stenosis, occlusion or aneurysm.  There is a common trunk of the CHANTAL which arises predominantly through the right left CHANTAL is slightly diminutive proximally.    Posterior circulation: Intracranial vertebral, basilar, and posterior cerebral arteries are intact without focal high-grade stenosis, occlusion, or aneurysm.    Venous structures (limited evaluation): Normal.                                        CT Head Without Contrast (Final result)  Result time 09/23/22 18:58:47      Final result by Rafita Metzger MD (09/23/22 18:58:47)                   Impression:      No acute intracranial CT abnormality on this motion degraded exam.    Similar senescent changes.    All CT scans at this facility are performed  using dose modulation techniques as appropriate to performed exam including the following:  automated exposure control; adjustment of mA and/or kV according to the patients size (this includes techniques or standardized protocols for targeted exams where dose is matched to indication/reason for exam: i.e. extremities or head);  iterative reconstruction technique.      Electronically signed by: Rafita Metzger  Date:    09/23/2022  Time:    18:58               Narrative:    EXAMINATION:  CT HEAD WITHOUT CONTRAST    CLINICAL HISTORY:  Mental status change, unknown cause;    TECHNIQUE:  Low dose axial CT images obtained throughout the head without intravenous contrast. Sagittal and coronal reconstructions were performed.    COMPARISON:  Multiple priors    FINDINGS:  Intracranial compartment:    Motion degrades the exam.    Ventricles and sulci are normal in size for age without evidence of hydrocephalus. No extra-axial blood or fluid collections.    Mild microvascular ischemic change.  Remote bilateral basal ganglia lacunar infarcts.  No parenchymal mass, hemorrhage, edema or major vascular distribution infarct.    Skull/extracranial contents (limited evaluation): No fracture. Mastoid air cells and paranasal sinuses are essentially clear.                                      OTHER STUDIES/PROCEDURES:  TTE  There is no evidence of intracardiac shunting. Limited visualization.  Concentric hypertrophy and normal systolic function.  The estimated ejection fraction is 60%.  Normal left ventricular diastolic function.  Normal right ventricular size with normal right ventricular systolic function.  Normal central  venous pressure (3 mmHg).  Technically limited echocardiogram.      Assessment/Plan:  P   82 y.o. right handed female with  has a past medical history of  Cardiomyopathy, Depression, Diabetes mellitus, Hypertension,HLD, Lumbar herniated disc, Obesity, RBBB, and Sleep apnea. who presents for increasing confusion x 1 day.   NIHSS 0,  Exam unremarkeable with return to baseline mentation per patient. Neuroimaging unrevealing.  Etiology of symptoms secondary to metabolic derangements .    - Continue ASA 81 mg for secondary stroke prevention   - Continue Atorvastatin 40mg for goal LDL<70  - Recommend BP <130/80  but would avoid aggressive management or sudden drops in BP which may cause decreased cerebral perfusion  would opt for range of 140-160 SBP  - recommend improved blood glucose management   Endocrinology outpatient followup       Differential diagnosis was explained to the patient. All questions were answered. Patient understood and agreed to adhere to plan.       No further intervention indicated at this time from Neurology Service. Please call if any further questions or any changes in neurologic condition.      Case discussed with Dr. Vizcaino    Thank you for your consult.    Electronically signed by:   Nunu Ramirez MD   9/24/2022 10:18 AM
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
## 2022-09-24 NOTE — PLAN OF CARE
Problem: Cognitive Impairment (Stroke, Ischemic/Transient Ischemic Attack)  Goal: Optimal Cognitive Function  Outcome: Ongoing, Progressing     Problem: Communication Impairment (Stroke, Ischemic/Transient Ischemic Attack)  Goal: Improved Communication Skills  Outcome: Ongoing, Progressing     Problem: Functional Ability Impaired (Stroke, Ischemic/Transient Ischemic Attack)  Goal: Optimal Functional Ability  Outcome: Ongoing, Progressing     Problem: Fall Injury Risk  Goal: Absence of Fall and Fall-Related Injury  Outcome: Ongoing, Progressing     Problem: Adult Inpatient Plan of Care  Goal: Plan of Care Review  Outcome: Ongoing, Progressing  Goal: Absence of Hospital-Acquired Illness or Injury  Outcome: Ongoing, Progressing  Goal: Optimal Comfort and Wellbeing  Outcome: Ongoing, Progressing  Goal: Readiness for Transition of Care  Outcome: Ongoing, Progressing     Problem: Diabetes Comorbidity  Goal: Blood Glucose Level Within Targeted Range  Outcome: Ongoing, Progressing     Problem: Skin Injury Risk Increased  Goal: Skin Health and Integrity  Outcome: Ongoing, Progressing

## 2022-09-24 NOTE — ASSESSMENT & PLAN NOTE
-hypertensive in ED  -given Antihypertensives in ED  -hold antihypertensives and allow permissive HTN until evaluated by Neurology  -PRN labetalol per stroke protocol

## 2022-09-24 NOTE — PLAN OF CARE
The sw spoke to the pt via phone to complete the assessment. The pt lives alone in [place] and still drives. The pt's independent with her adl's but states it's been getting a bit hard for her lately. The pt has a bsc,rollator,built in shower chair,nebulizer,glucometer,walking cane and blood pressure machine at home. The pt goes to [facility] in [place] on [address] for her medical care. The pt list her dtr Shawne Phoenix [phone] and close family friend Pepe Harris [phone] as her emergency contacts. The sw spoke to Teresa via phone with the pt's permissions and she confirmed all the info stated by the pt. The pt and her nurse state therapy recommends snf for her at d/c and she's amendable to go at d/c. Teresa states the pt went to [facility] snf about 2 years ago after a back surgery. The sw informed the pt and her dtr she will bring them a Post Acute SNF list with the quality star rating to choose 3 in order of preference. They both acknowledged understanding. The sw informed Dr. Navarro of this info mentioned above. The sw will completed the white board in the pt's room and gave her a d/c brochure with her name and contact info on it. The sw encouraged the pt and her dtr to call if they have any further questions or concerns. The sw will continue to follow the pt throughout her transitions of care and will assist with any d/c needs.        09/24/22 8564   Discharge Assessment   Assessment Type Discharge Planning Assessment   Confirmed/corrected address, phone number and insurance Yes   Confirmed Demographics Correct on Facesheet   Source of Information patient;family   If unable to respond/provide information was family/caregiver contacted? Yes   Contact Name/Number Shawne Phoenix(dtr)[phone]   When was your last doctors appointment? 09/23/22   Does patient/caregiver understand observation status Yes   Communicated AKIKO with patient/caregiver Date not available/Unable to determine   Reason For  Admission Acute encephalopathy   Lives With alone   Do you expect to return to your current living situation? No  (the pt states she's going to snf at d/c)   Do you have help at home or someone to help you manage your care at home? Yes   Who are your caregiver(s) and their phone number(s)? Shawne Phoenix(dtr)[phone]/Pepe Harris(friend)[phone]   Prior to hospitilization cognitive status: Alert/Oriented   Current cognitive status: Alert/Oriented   Walking or Climbing Stairs Difficulty ambulation difficulty, requires equipment;stair climbing difficulty, requires equipment;transferring difficulty, requires equipment   Dressing/Bathing Difficulty bathing difficulty, requires equipment   Home Accessibility wheelchair accessible   Home Layout Able to live on 1st floor   Equipment Currently Used at Home bedside commode;rollator;shower chair;nebulizer;glucometer;cane, straight  (blood pressure machine)   Readmission within 30 days? No   Patient currently being followed by outpatient case management? No   Do you currently have service(s) that help you manage your care at home? No   Do you take prescription medications? Yes   Do you have prescription coverage? Yes   Coverage [employer])   Do you have any problems affording any of your prescribed medications? No   Is the patient taking medications as prescribed? yes   Who is going to help you get home at discharge? Shawne Phoenix(dtr)[phone]/Pepe Harris(friend)[phone]   How do you get to doctors appointments? car, drives self   Are you on dialysis? No   Do you take coumadin? No   Discharge Plan A Skilled Nursing Facility   Discharge Plan B Home Health   DME Needed Upon Discharge  none   Discharge Plan discussed with: Patient;Adult children   Discharge Barriers Identified None

## 2022-09-24 NOTE — CONSULTS
9/24- RD working remotely for weekend coverage. Consulted for stroke pathway. Seen by SLP- recommending regular and thin liquids. Some aspiration precautions noted. Will attach diet edu to d/c instructions. Inpatient RD to follow up for diet edu.     Thanks for the consult! Re-consult as needed.   Viktoriya Ren RD, LDN

## 2022-09-25 LAB
ALBUMIN SERPL BCP-MCNC: 3.4 G/DL (ref 3.5–5.2)
ALP SERPL-CCNC: 92 U/L (ref 55–135)
ALT SERPL W/O P-5'-P-CCNC: 17 U/L (ref 10–44)
ANION GAP SERPL CALC-SCNC: 13 MMOL/L (ref 8–16)
AST SERPL-CCNC: 24 U/L (ref 10–40)
BASOPHILS # BLD AUTO: 0.04 K/UL (ref 0–0.2)
BASOPHILS NFR BLD: 0.7 % (ref 0–1.9)
BILIRUB SERPL-MCNC: 0.6 MG/DL (ref 0.1–1)
BUN SERPL-MCNC: 15 MG/DL (ref 8–23)
CALCIUM SERPL-MCNC: 9.5 MG/DL (ref 8.7–10.5)
CHLORIDE SERPL-SCNC: 97 MMOL/L (ref 95–110)
CO2 SERPL-SCNC: 31 MMOL/L (ref 23–29)
CREAT SERPL-MCNC: 0.8 MG/DL (ref 0.5–1.4)
DIFFERENTIAL METHOD: ABNORMAL
EOSINOPHIL # BLD AUTO: 0.1 K/UL (ref 0–0.5)
EOSINOPHIL NFR BLD: 1.7 % (ref 0–8)
ERYTHROCYTE [DISTWIDTH] IN BLOOD BY AUTOMATED COUNT: 14.2 % (ref 11.5–14.5)
EST. GFR  (NO RACE VARIABLE): >60 ML/MIN/1.73 M^2
GLUCOSE SERPL-MCNC: 229 MG/DL (ref 70–110)
HCT VFR BLD AUTO: 46.6 % (ref 37–48.5)
HGB BLD-MCNC: 14.2 G/DL (ref 12–16)
IMM GRANULOCYTES # BLD AUTO: 0.01 K/UL (ref 0–0.04)
IMM GRANULOCYTES NFR BLD AUTO: 0.2 % (ref 0–0.5)
LYMPHOCYTES # BLD AUTO: 2 K/UL (ref 1–4.8)
LYMPHOCYTES NFR BLD: 36.6 % (ref 18–48)
MAGNESIUM SERPL-MCNC: 1.8 MG/DL (ref 1.6–2.6)
MCH RBC QN AUTO: 24.8 PG (ref 27–31)
MCHC RBC AUTO-ENTMCNC: 30.5 G/DL (ref 32–36)
MCV RBC AUTO: 82 FL (ref 82–98)
MONOCYTES # BLD AUTO: 0.6 K/UL (ref 0.3–1)
MONOCYTES NFR BLD: 11.9 % (ref 4–15)
NEUTROPHILS # BLD AUTO: 2.6 K/UL (ref 1.8–7.7)
NEUTROPHILS NFR BLD: 48.9 % (ref 38–73)
NRBC BLD-RTO: 0 /100 WBC
PLATELET # BLD AUTO: 142 K/UL (ref 150–450)
PMV BLD AUTO: ABNORMAL FL (ref 9.2–12.9)
POCT GLUCOSE: 229 MG/DL (ref 70–110)
POCT GLUCOSE: 236 MG/DL (ref 70–110)
POCT GLUCOSE: 278 MG/DL (ref 70–110)
POTASSIUM SERPL-SCNC: 3.3 MMOL/L (ref 3.5–5.1)
PROT SERPL-MCNC: 6.7 G/DL (ref 6–8.4)
RBC # BLD AUTO: 5.72 M/UL (ref 4–5.4)
SODIUM SERPL-SCNC: 141 MMOL/L (ref 136–145)
WBC # BLD AUTO: 5.38 K/UL (ref 3.9–12.7)

## 2022-09-25 PROCEDURE — 25000003 PHARM REV CODE 250: Performed by: INTERNAL MEDICINE

## 2022-09-25 PROCEDURE — 25000003 PHARM REV CODE 250: Performed by: FAMILY MEDICINE

## 2022-09-25 PROCEDURE — 96375 TX/PRO/DX INJ NEW DRUG ADDON: CPT | Mod: 59

## 2022-09-25 PROCEDURE — 80053 COMPREHEN METABOLIC PANEL: CPT | Performed by: NURSE PRACTITIONER

## 2022-09-25 PROCEDURE — 99900035 HC TECH TIME PER 15 MIN (STAT)

## 2022-09-25 PROCEDURE — 85025 COMPLETE CBC W/AUTO DIFF WBC: CPT | Performed by: NURSE PRACTITIONER

## 2022-09-25 PROCEDURE — 94761 N-INVAS EAR/PLS OXIMETRY MLT: CPT

## 2022-09-25 PROCEDURE — 63600175 PHARM REV CODE 636 W HCPCS: Performed by: NURSE PRACTITIONER

## 2022-09-25 PROCEDURE — 83735 ASSAY OF MAGNESIUM: CPT | Performed by: NURSE PRACTITIONER

## 2022-09-25 PROCEDURE — 36415 COLL VENOUS BLD VENIPUNCTURE: CPT | Performed by: NURSE PRACTITIONER

## 2022-09-25 PROCEDURE — G0378 HOSPITAL OBSERVATION PER HR: HCPCS

## 2022-09-25 PROCEDURE — 96376 TX/PRO/DX INJ SAME DRUG ADON: CPT

## 2022-09-25 PROCEDURE — 96372 THER/PROPH/DIAG INJ SC/IM: CPT | Mod: 59 | Performed by: NURSE PRACTITIONER

## 2022-09-25 PROCEDURE — 25000003 PHARM REV CODE 250: Performed by: NURSE PRACTITIONER

## 2022-09-25 RX ORDER — DULOXETIN HYDROCHLORIDE 30 MG/1
30 CAPSULE, DELAYED RELEASE ORAL DAILY
Status: DISCONTINUED | OUTPATIENT
Start: 2022-09-25 | End: 2022-09-28 | Stop reason: HOSPADM

## 2022-09-25 RX ORDER — POTASSIUM CHLORIDE 750 MG/1
30 TABLET, EXTENDED RELEASE ORAL ONCE
Status: COMPLETED | OUTPATIENT
Start: 2022-09-25 | End: 2022-09-25

## 2022-09-25 RX ORDER — POTASSIUM CHLORIDE 7.45 MG/ML
10 INJECTION INTRAVENOUS
Status: COMPLETED | OUTPATIENT
Start: 2022-09-25 | End: 2022-09-25

## 2022-09-25 RX ORDER — AMLODIPINE BESYLATE 5 MG/1
10 TABLET ORAL DAILY
Status: DISCONTINUED | OUTPATIENT
Start: 2022-09-25 | End: 2022-09-28 | Stop reason: HOSPADM

## 2022-09-25 RX ORDER — LOSARTAN POTASSIUM 25 MG/1
25 TABLET ORAL DAILY
Status: DISCONTINUED | OUTPATIENT
Start: 2022-09-25 | End: 2022-09-28 | Stop reason: HOSPADM

## 2022-09-25 RX ADMIN — POTASSIUM CHLORIDE 10 MEQ: 7.46 INJECTION, SOLUTION INTRAVENOUS at 09:09

## 2022-09-25 RX ADMIN — AMLODIPINE BESYLATE 10 MG: 5 TABLET ORAL at 09:09

## 2022-09-25 RX ADMIN — POTASSIUM CHLORIDE 30 MEQ: 750 TABLET, EXTENDED RELEASE ORAL at 09:09

## 2022-09-25 RX ADMIN — ATORVASTATIN CALCIUM 40 MG: 40 TABLET, FILM COATED ORAL at 09:09

## 2022-09-25 RX ADMIN — Medication 6 MG: at 09:09

## 2022-09-25 RX ADMIN — ACETAMINOPHEN 650 MG: 325 TABLET ORAL at 09:09

## 2022-09-25 RX ADMIN — INSULIN ASPART 2 UNITS: 100 INJECTION, SOLUTION INTRAVENOUS; SUBCUTANEOUS at 04:09

## 2022-09-25 RX ADMIN — ASPIRIN 81 MG: 81 TABLET, CHEWABLE ORAL at 09:09

## 2022-09-25 RX ADMIN — POTASSIUM CHLORIDE 10 MEQ: 7.46 INJECTION, SOLUTION INTRAVENOUS at 08:09

## 2022-09-25 RX ADMIN — DULOXETINE 30 MG: 30 CAPSULE, DELAYED RELEASE ORAL at 09:09

## 2022-09-25 RX ADMIN — ENOXAPARIN SODIUM 40 MG: 100 INJECTION SUBCUTANEOUS at 04:09

## 2022-09-25 RX ADMIN — Medication 6 MG: at 01:09

## 2022-09-25 RX ADMIN — INSULIN ASPART 2 UNITS: 100 INJECTION, SOLUTION INTRAVENOUS; SUBCUTANEOUS at 01:09

## 2022-09-25 RX ADMIN — LOSARTAN POTASSIUM 25 MG: 25 TABLET, FILM COATED ORAL at 09:09

## 2022-09-25 NOTE — CONSULTS
Thank you for your consult to [facility]. We have reviewed the patient chart. This patient does meet criteria for [facility] service at this time. Will assume care of the patient at this time

## 2022-09-25 NOTE — PLAN OF CARE
"Pt is alert and oriented x4. Breathing on room air.    Pt stated "has a headache rating a 6 on a 0-10 scale". Denies any nausea/vomiting and shortness of breath. During NIH stroke scale, when both legs were touched simultaneously, pt stated that right leg feels dull compared to her left leg. Also, pt stated she has numbness and tingling sensation on her both feet. Denies any numbness and tingling in upper extremities. Radial and dorsal pulses are 2+.     Pt also stated about her vision for both eyes that she is seeing floats. Pt stated left eye peripheral vision is blurry and she needs glasses to see better.    Bed is in the lowest position. Cluster-free environment maintained. Bed alarm is on. Will continue to monitor.      "

## 2022-09-25 NOTE — PLAN OF CARE
Problem: Adult Inpatient Plan of Care  Goal: Plan of Care Review  Outcome: Ongoing, Progressing   Updated care plan.  Chart review complete.

## 2022-09-25 NOTE — PLAN OF CARE
Problem: Physical Therapy  Goal: Physical Therapy Goal  Description: Goals to be met by: 10/24/2022     Patient will increase functional independence with mobility by performin. Supine <> sit with Stand-by Assistance  2. Sit<>stand transfer with SBA  3. Bed <>w/c  transfer with SBA using rw  4. Ambulation 75 ft using a rw with SBA  Outcome: Ongoing, Progressing   PT evaluation was completed. Pt required MODAx 1 for supine to sit with increased effort, sit to supine with MINAx 1, sit<>stand transfer to a rw with MINAx 1, ambulation 4 steps forward/backward using a rw  with MINAx 1. Pt lives alone and reports increased difficulty performing ADLs including cooking/ housekeeping tasks and mobility skills recently. Pt will benefit from post acute placement upon d/c to increase independence prior to returning home.

## 2022-09-25 NOTE — PT/OT/SLP EVAL
"Physical Therapy Evaluation    Patient Name:  Jayne You   MRN:  [id number]    Recommendations:     Discharge Recommendations:  nursing facility, skilled (post acute placement)   Discharge Equipment Recommendations:  (TBD)   Barriers to discharge: Decreased caregiver support and decreased functional mobility    Assessment:     Jayne You is a 82 y.o. female admitted with a medical diagnosis of Acute encephalopathy.  She presents with the following impairments/functional limitations:  weakness, impaired endurance, impaired self care skills, impaired functional mobility, gait instability, impaired balance, decreased lower extremity function, decreased upper extremity function, impaired cognition, decreased safety awareness  Pt required MODAx 1 for supine to sit with increased effort, sit to supine with MINAx 1, sit<>stand transfer to a rw with MINAx 1, ambulation 4 steps forward/backward using a rw  with MINAx 1. Pt lives alone and reports increased difficulty performing ADLs including cooking/ housekeeping tasks and mobility skills most  recently. Pt will benefit from post acute placement upon d/c to increase independence prior to returning home..    Rehab Prognosis: Good; patient would benefit from acute skilled PT services to address these deficits and reach maximum level of function.    Recent Surgery: * No surgery found *      Plan:     During this hospitalization, patient to be seen 6 x/week to address the identified rehab impairments via gait training, therapeutic activities, therapeutic exercises and progress toward the following goals:    Plan of Care Expires:  10/24/22    Subjective     Chief Complaint: Pt c/o " being weak."  Patient/Family Comments/goals: to be independent  Pain/Comfort:  Pain Rating 1: 0/10  Pain Rating Post-Intervention 1: 0/10    Patients cultural, spiritual, Worship conflicts given the current situation: no    Living Environment:  Pt lives alone in [facility], [address] without HR, walk " in shower.  Pt was ambulating household distances using rollator and community distances with hurry cane. Pt was driving and just began outpatient PT.  Pt was performing cooking , housekeeping  and mobility skills with increased difficulty most recently.  Prior to admission, patients level of function was MOD(I).  Equipment used at home: cane, quad, bedside commode, walker, rolling, rollator, shower chair (hurry cane).  DME owned (not currently used): none.  Upon discharge, patient will have assistance from occasionally from daughter who works 2 jobs and  and visits pt 1 x wk..    Objective:     Communicated with RN prior to session.  Patient found supine with telemetry, peripheral IV, PureWick, bed alarm  upon PT entry to room.    General Precautions: Standard, fall   Orthopedic Precautions:N/A   Braces: N/A  Respiratory Status: Room air    Exams:  Gross Motor Coordination:  WFL  Postural Exam:  Patient presented with the following abnormalities:    -       Rounded shoulders  L LE Strength: 3+/5  LLE ROM: WFL except limited hip flex due to soft tissue approximation        RLE Strength: 3+/5  RLE ROM: WFL except limited hip flex due to soft tissue approximation.    Functional Mobility:  Bed Mobility:     Rolling Left:  stand by assistance using bed rail  Rolling Right: stand by assistance using bed rail  Supine to Sit: moderate assistance with increased effort  Sit to Supine: minimum assistance for B LE advancement  Transfers:     Sit to Stand:  minimum assistance with rolling walker with cues for proper hand placement  Gait: ambulates 4 steps forward/ backward using a rw with MINAx 1 needing cues for increasing B step length and reciprocal gait pattern  Balance: Static stand:F-  using a rw    Dynamic stand: P+ using a rw    Therapeutic Activities and Exercises:   Pt was educated in role of PT and POC   Pt was instructed in supine to sit <>stand as reported above. Pt required SBA with static sit balance and CGAx 1  for static stand balance using RW. Pt performed step progression as reported above  and fatigues easily.      AM-PAC 6 CLICK MOBILITY  Total Score:15     Patient left HOB elevated with all lines intact, call button in reach, and RN notified.    GOALS:   Multidisciplinary Problems       Physical Therapy Goals          Problem: Physical Therapy    Goal Priority Disciplines Outcome Goal Variances Interventions   Physical Therapy Goal     PT, PT/OT Ongoing, Progressing     Description: Goals to be met by: 10/24/2022     Patient will increase functional independence with mobility by performin. Supine <> sit with Stand-by Assistance  2. Sit<>stand transfer with SBA  3. Bed <>w/c  transfer with SBA using rw  4. Ambulation 75 ft using a rw with SBA                       History:     Past Medical History:   Diagnosis Date    Arthritis     Cardiomyopathy     Depression     Diabetes mellitus     Hypertension     Lipids abnormal     Lumbar herniated disc     Obesity     RBBB     Sleep apnea        Past Surgical History:   Procedure Laterality Date    APPENDECTOMY      BACK SURGERY  ,,    BREAST SURGERY Bilateral     reduction    EYE SURGERY Bilateral     HIP ARTHROPLASTY Left 2020    Procedure: ARTHROPLASTY, HIP;  Surgeon: Isaiah Mccormick MD;  Location: [facility];  Service: Orthopedics;  Laterality: Left;    JOINT REPLACEMENT Right     hip    REVISION TOTAL HIP ARTHROPLASTY Left 2020    Procedure: REVISION, TOTAL ARTHROPLASTY, HIP;  Surgeon: Isaiah Mccormick MD;  Location: [facility];  Service: Orthopedics;  Laterality: Left;    TOTAL HIP ARTHROPLASTY Right     TOTAL KNEE ARTHROPLASTY Left 2019    Procedure: ARTHROPLASTY, KNEE, TOTAL;  Surgeon: Isaiah Mccormick MD;  Location: [facility];  Service: Orthopedics;  Laterality: Left;       Time Tracking:     PT Received On: 22  PT Start Time: 1239     PT Stop Time: 1305  PT Total Time (min): 26 min     Billable Minutes: Evaluation 15  and Therapeutic Activity 11 09/24/2022

## 2022-09-25 NOTE — PLAN OF CARE
Problem: Adjustment to Illness (Stroke, Ischemic/Transient Ischemic Attack)  Goal: Optimal Coping  Outcome: Ongoing, Progressing

## 2022-09-26 LAB
ANION GAP SERPL CALC-SCNC: 11 MMOL/L (ref 8–16)
BACTERIA #/AREA URNS HPF: ABNORMAL /HPF
BILIRUB UR QL STRIP: NEGATIVE
BUN SERPL-MCNC: 12 MG/DL (ref 8–23)
CALCIUM SERPL-MCNC: 9.4 MG/DL (ref 8.7–10.5)
CHLORIDE SERPL-SCNC: 98 MMOL/L (ref 95–110)
CLARITY UR: ABNORMAL
CO2 SERPL-SCNC: 30 MMOL/L (ref 23–29)
COLOR UR: YELLOW
CREAT SERPL-MCNC: 0.8 MG/DL (ref 0.5–1.4)
EST. GFR  (NO RACE VARIABLE): >60 ML/MIN/1.73 M^2
GLUCOSE SERPL-MCNC: 218 MG/DL (ref 70–110)
GLUCOSE UR QL STRIP: NEGATIVE
HGB UR QL STRIP: ABNORMAL
HYALINE CASTS #/AREA URNS LPF: 0 /LPF
KETONES UR QL STRIP: NEGATIVE
LEUKOCYTE ESTERASE UR QL STRIP: ABNORMAL
MICROSCOPIC COMMENT: ABNORMAL
NITRITE UR QL STRIP: NEGATIVE
PH UR STRIP: 8 [PH] (ref 5–8)
POCT GLUCOSE: 196 MG/DL (ref 70–110)
POCT GLUCOSE: 284 MG/DL (ref 70–110)
POCT GLUCOSE: 294 MG/DL (ref 70–110)
POTASSIUM SERPL-SCNC: 3.8 MMOL/L (ref 3.5–5.1)
PROT UR QL STRIP: ABNORMAL
RBC #/AREA URNS HPF: >100 /HPF (ref 0–4)
SODIUM SERPL-SCNC: 139 MMOL/L (ref 136–145)
SP GR UR STRIP: 1.02 (ref 1–1.03)
SQUAMOUS #/AREA URNS HPF: 3 /HPF
URN SPEC COLLECT METH UR: ABNORMAL
UROBILINOGEN UR STRIP-ACNC: NEGATIVE EU/DL
WBC #/AREA URNS HPF: >100 /HPF (ref 0–5)

## 2022-09-26 PROCEDURE — 87077 CULTURE AEROBIC IDENTIFY: CPT | Performed by: STUDENT IN AN ORGANIZED HEALTH CARE EDUCATION/TRAINING PROGRAM

## 2022-09-26 PROCEDURE — 80048 BASIC METABOLIC PNL TOTAL CA: CPT | Performed by: STUDENT IN AN ORGANIZED HEALTH CARE EDUCATION/TRAINING PROGRAM

## 2022-09-26 PROCEDURE — 81000 URINALYSIS NONAUTO W/SCOPE: CPT | Performed by: STUDENT IN AN ORGANIZED HEALTH CARE EDUCATION/TRAINING PROGRAM

## 2022-09-26 PROCEDURE — 96372 THER/PROPH/DIAG INJ SC/IM: CPT | Performed by: STUDENT IN AN ORGANIZED HEALTH CARE EDUCATION/TRAINING PROGRAM

## 2022-09-26 PROCEDURE — 96365 THER/PROPH/DIAG IV INF INIT: CPT

## 2022-09-26 PROCEDURE — 25000003 PHARM REV CODE 250: Performed by: NURSE PRACTITIONER

## 2022-09-26 PROCEDURE — 94761 N-INVAS EAR/PLS OXIMETRY MLT: CPT

## 2022-09-26 PROCEDURE — 25000003 PHARM REV CODE 250: Performed by: INTERNAL MEDICINE

## 2022-09-26 PROCEDURE — 99900035 HC TECH TIME PER 15 MIN (STAT)

## 2022-09-26 PROCEDURE — 87086 URINE CULTURE/COLONY COUNT: CPT | Performed by: STUDENT IN AN ORGANIZED HEALTH CARE EDUCATION/TRAINING PROGRAM

## 2022-09-26 PROCEDURE — 87088 URINE BACTERIA CULTURE: CPT | Performed by: STUDENT IN AN ORGANIZED HEALTH CARE EDUCATION/TRAINING PROGRAM

## 2022-09-26 PROCEDURE — 25000003 PHARM REV CODE 250: Performed by: STUDENT IN AN ORGANIZED HEALTH CARE EDUCATION/TRAINING PROGRAM

## 2022-09-26 PROCEDURE — G0378 HOSPITAL OBSERVATION PER HR: HCPCS

## 2022-09-26 PROCEDURE — 63600175 PHARM REV CODE 636 W HCPCS: Performed by: STUDENT IN AN ORGANIZED HEALTH CARE EDUCATION/TRAINING PROGRAM

## 2022-09-26 PROCEDURE — 36415 COLL VENOUS BLD VENIPUNCTURE: CPT | Performed by: STUDENT IN AN ORGANIZED HEALTH CARE EDUCATION/TRAINING PROGRAM

## 2022-09-26 PROCEDURE — 97530 THERAPEUTIC ACTIVITIES: CPT

## 2022-09-26 PROCEDURE — 87186 SC STD MICRODIL/AGAR DIL: CPT | Performed by: STUDENT IN AN ORGANIZED HEALTH CARE EDUCATION/TRAINING PROGRAM

## 2022-09-26 RX ORDER — HYDROCHLOROTHIAZIDE 12.5 MG/1
12.5 TABLET ORAL DAILY
Status: DISCONTINUED | OUTPATIENT
Start: 2022-09-26 | End: 2022-09-28 | Stop reason: HOSPADM

## 2022-09-26 RX ORDER — ENOXAPARIN SODIUM 100 MG/ML
40 INJECTION SUBCUTANEOUS EVERY 12 HOURS
Status: DISCONTINUED | OUTPATIENT
Start: 2022-09-26 | End: 2022-09-28 | Stop reason: HOSPADM

## 2022-09-26 RX ADMIN — AMLODIPINE BESYLATE 10 MG: 5 TABLET ORAL at 08:09

## 2022-09-26 RX ADMIN — ATORVASTATIN CALCIUM 40 MG: 40 TABLET, FILM COATED ORAL at 08:09

## 2022-09-26 RX ADMIN — ENOXAPARIN SODIUM 40 MG: 100 INJECTION SUBCUTANEOUS at 09:09

## 2022-09-26 RX ADMIN — ASPIRIN 81 MG: 81 TABLET, CHEWABLE ORAL at 08:09

## 2022-09-26 RX ADMIN — LOSARTAN POTASSIUM 25 MG: 25 TABLET, FILM COATED ORAL at 08:09

## 2022-09-26 RX ADMIN — DULOXETINE 30 MG: 30 CAPSULE, DELAYED RELEASE ORAL at 08:09

## 2022-09-26 RX ADMIN — CEFTRIAXONE 1 G: 1 INJECTION, SOLUTION INTRAVENOUS at 09:09

## 2022-09-26 RX ADMIN — HYDROCHLOROTHIAZIDE 12.5 MG: 12.5 TABLET ORAL at 11:09

## 2022-09-26 RX ADMIN — INSULIN ASPART 1 UNITS: 100 INJECTION, SOLUTION INTRAVENOUS; SUBCUTANEOUS at 09:09

## 2022-09-26 NOTE — PROGRESS NOTES
[facility] Medicine  Telemedicine Progress Note    Patient Name: Jayne You  MRN: [id number]  Patient Class: OP- Observation   Admission Date: 9/23/2022  Length of Stay: 1 days  Attending Physician: Joie Lazo MD  Primary Care Provider: Mehrdad Cisneros MD          Subjective:     Principal Problem:Acute encephalopathy        HPI:  Jayne You is an 82-year-old female with a history of diabetes, cardiomyopathy, hypertension, Back pain, Hyperlipidemia, Arthritis, Sleep apnea, and Morbid obesity. She presented to the ED for evaluation of acute encephalopathy. She was having confusion since 3:00 p.m. yesterday when she called her son. She went to see her primary care physician yesterday morning. She was normal last at 6:00 a.m. As per family. Her son claims she called and sounded confused and did know where she was located. At 6:00 p.m. she was driving [place] when she stopped in the middle of the road. EMS was called for her. Patient claimed she was weak and not feeling well and was asking the same questions repeatedly with inability to remember things.    On arrival to ED adequate neuro exam was done and old stroke was called. NIH stroke scale 2. Patient mainly is confused and has slurring of speech. Lab work pretty unremarkable, case discussed with vascular neurologist who thought this may be stroke mimic and recommended a CTA with was without acute abnormality. CTH negative. Admit to [facility] Medicine for further care and neurology evaluation.      Overview/Hospital Course:  No notes on file    Interval History: patient doing well, BP improved after AM meds given. Labs stable. CM working on SNF placement.     Review of Systems   Respiratory:  Negative for shortness of breath.    Cardiovascular:  Negative for chest pain and leg swelling.   Neurological:  Positive for weakness. Negative for dizziness and headaches.   Objective:     Vital Signs (Most Recent):  Temp:  98.4 °F (36.9 °C) (09/26/22 1041)  Pulse: 97 (09/26/22 1207)  Resp: 18 (09/26/22 1041)  BP: (!) 143/67 (09/26/22 1041)  SpO2: 98 % (09/26/22 1041)   Vital Signs (24h Range):  Temp:  [97.5 °F (36.4 °C)-98.6 °F (37 °C)] 98.4 °F (36.9 °C)  Pulse:  [] 97  Resp:  [18-20] 18  SpO2:  [93 %-98 %] 98 %  BP: (133-189)/() 143/67     Weight: 122.5 kg (270 lb)  Body mass index is 46.35 kg/m².    Intake/Output Summary (Last 24 hours) at 9/26/2022 1420  Last data filed at 9/26/2022 0358  Gross per 24 hour   Intake --   Output 250 ml   Net -250 ml        Physical Exam  Vitals reviewed.   Constitutional:       Appearance: Normal appearance.   HENT:      Head: Normocephalic and atraumatic.   Eyes:      General: No scleral icterus.     Conjunctiva/sclera: Conjunctivae normal.   Pulmonary:      Effort: Pulmonary effort is normal. No respiratory distress.   Skin:     Coloration: Skin is not jaundiced.      Findings: No erythema.   Psychiatric:         Mood and Affect: Mood normal.         Behavior: Behavior normal.       Significant Labs: All pertinent labs within the past 24 hours have been reviewed.  Recent Lab Results  (Last 5 results in the past 24 hours)        09/26/22  1040   09/26/22  0327   09/26/22  0320   09/25/22  1944   09/25/22  1639        Anion Gap   11             BUN   12             Calcium   9.4             Chloride   98             CO2   30             Creatinine   0.8             eGFR   >60             Glucose   218             POCT Glucose 284     196   236   229       Potassium   3.8             Sodium   139                                    Significant Imaging: I have reviewed all pertinent imaging results/findings within the past 24 hours.      Assessment/Plan:      * Acute encephalopathy  -Evaluated by vascular neurology for stroke  -Per neurologist: Confusion with no focal symptoms. Likely stroke mimic. Obtain CTA  -CTA: No acute abnormality. No high-grade stenosis or major vessel  occlusion.  -unsure etiology  -CBG level 230  -CT head negative  -Labs reviewed and noted  -UA & UDS negative  -TSH 1.940  B12, folate, ETOH, ammonia   -Avoid anticholinergics if possible  -Delirium precautions  -consult neurology  -back to baseline    Stroke-like symptoms  -rule out stroke  -CTA brain negative  -CTH negative  -MRI negative  -ECHO   Antithrombotics for secondary stroke prevention: Antiplatelets: Aspirin: 81 mg daily    Statins for secondary stroke prevention and hyperlipidemia, if present:   Statins: Atorvastatin- 40 mg daily    Aggressive risk factor modification: HTN, DM, HLD, Obesity     Rehab efforts: The patient has been evaluated by a stroke team provider and the therapy needs have been fully considered based off the presenting complaints and exam findings. The following therapy evaluations are needed: None    Diagnostics ordered/pending: CTA Head to assess vasculature , HgbA1C to assess blood glucose levels, Lipid Profile to assess cholesterol levels, MRI head without contrast to assess brain parenchyma, TTE to assess cardiac function/status , TSH to assess thyroid function    VTE prophylaxis: Enoxaparin 40 mg SQ every 24 hours    BP parameters: Infarct: No intervention, SBP <220    Sleep apnea  -CPAP QHS    Morbid obesity  There is no height or weight on file to calculate BMI. Morbid obesity complicates all aspects of disease management from diagnostic modalities to treatment. Weight loss encouraged and health benefits explained to patient.     Hyperlipidemia  -Resume home statin    Type 2 diabetes mellitus without complication, without long-term current use of insulin  Patient's FSGs are uncontrolled due to hyperglycemia on current medication regimen.  Last A1c reviewed-   Lab Results   Component Value Date    HGBA1C 9.2 (H) 05/21/2022     Most recent fingerstick glucose reviewed-   Recent Labs   Lab 09/23/22  1903   POCTGLUCOSE 230*     Current correctional scale  Low  Maintain  anti-hyperglycemic dose as follows-   Antihyperglycemics (From admission, onward)    None        Hold Oral hypoglycemics while patient is in the hospital.    Essential hypertension  -hypertensive in ED  -given Antihypertensives in ED  -hold antihypertensives and allow permissive HTN until evaluated by Neurology  -PRN labetalol per stroke protocol      VTE Risk Mitigation (From admission, onward)         Ordered     enoxaparin injection 40 mg  Every 12 hours         09/26/22 1009     IP VTE HIGH RISK PATIENT  Once         09/24/22 0333     Place sequential compression device  Until discontinued         09/24/22 0332                      I have completed this tele-visit without the assistance of a telepresenter.    The attending portion of this evaluation, treatment, and documentation was performed per Joie Lazo MD via Telemedicine AudioVisual using the secure [employer] software platform with 2 way audio/video. The provider was located off-site and the patient is located in the hospital. The aforementioned video software was utilized to document the relevant history and physical exam    Joie Lazo MD  Department of Hospital Medicine   [place] [facility]

## 2022-09-26 NOTE — SUBJECTIVE & OBJECTIVE
Interval History: CVA workup negative, daughter at bedside today, agreeable to SNF placement. States mental status is back to baseline. Replacing K.     Review of Systems   Respiratory:  Negative for shortness of breath.    Cardiovascular:  Negative for chest pain and leg swelling.   Neurological:  Positive for weakness. Negative for dizziness and headaches.   Objective:     Vital Signs (Most Recent):  Temp: 97.9 °F (36.6 °C) (09/25/22 1945)  Pulse: 93 (09/25/22 1948)  Resp: 18 (09/25/22 1948)  BP: (!) 153/70 (09/25/22 1945)  SpO2: 96 % (09/25/22 1948)   Vital Signs (24h Range):  Temp:  [97.6 °F (36.4 °C)-97.9 °F (36.6 °C)] 97.9 °F (36.6 °C)  Pulse:  [68-98] 93  Resp:  [18-20] 18  SpO2:  [92 %-96 %] 96 %  BP: (119-187)/(70-83) 153/70     Weight: 122.5 kg (270 lb)  Body mass index is 46.35 kg/m².    Intake/Output Summary (Last 24 hours) at 9/25/2022 2313  Last data filed at 9/25/2022 0811  Gross per 24 hour   Intake 125 ml   Output 650 ml   Net -525 ml      Physical Exam  Vitals reviewed.   Constitutional:       Appearance: Normal appearance.   HENT:      Head: Normocephalic and atraumatic.   Eyes:      General: No scleral icterus.     Conjunctiva/sclera: Conjunctivae normal.   Pulmonary:      Effort: Pulmonary effort is normal. No respiratory distress.   Skin:     Coloration: Skin is not jaundiced.      Findings: No erythema.   Psychiatric:         Mood and Affect: Mood normal.         Behavior: Behavior normal.       Significant Labs: All pertinent labs within the past 24 hours have been reviewed.  Recent Lab Results         09/25/22  1944 09/25/22  1639   09/25/22  0515   09/25/22  0218        Albumin       3.4       Alkaline Phosphatase       92       ALT       17       Anion Gap       13       AST       24       Baso #       0.04       Basophil %       0.7       BILIRUBIN TOTAL       0.6  Comment: For infants and newborns, interpretation of results should be based  on gestational age, weight and in agreement  with clinical  observations.    Premature Infant recommended reference ranges:  Up to 24 hours.............<8.0 mg/dL  Up to 48 hours............<12.0 mg/dL  3-5 days..................<15.0 mg/dL  6-29 days.................<15.0 mg/dL         BUN       15       Calcium       9.5       Chloride       97       CO2       31       Creatinine       0.8       Differential Method       Automated       eGFR       >60       Eos #       0.1       Eosinophil %       1.7       Glucose       229       Gran # (ANC)       2.6       Gran %       48.9       Hematocrit       46.6       Hemoglobin       14.2       Immature Grans (Abs)       0.01  Comment: Mild elevation in immature granulocytes is non specific and   can be seen in a variety of conditions including stress response,   acute inflammation, trauma and pregnancy. Correlation with other   laboratory and clinical findings is essential.         Immature Granulocytes       0.2       Lymph #       2.0       Lymph %       36.6       Magnesium       1.8       MCH       24.8       MCHC       30.5       MCV       82       Mono #       0.6       Mono %       11.9       MPV       SEE COMMENT  Comment: Result not available.       nRBC       0       Platelets       142       POCT Glucose 236   229   278         Potassium       3.3       PROTEIN TOTAL       6.7       RBC       5.72       RDW       14.2       Sodium       141       WBC       5.38               Significant Imaging: I have reviewed all pertinent imaging results/findings within the past 24 hours.

## 2022-09-26 NOTE — PROGRESS NOTES
[facility]  Telemedicine Progress Note    Patient Name: Jayne You  MRN: [id number]  Patient Class: OP- Observation   Admission Date: 9/23/2022  Length of Stay: 1 days  Attending Physician: Joie Lazo MD  Primary Care Provider: Mehrdad Cisneros MD          Subjective:     Principal Problem:Acute encephalopathy        HPI:  Jayne You is an 82-year-old female with a history of diabetes, cardiomyopathy, hypertension, Back pain, Hyperlipidemia, Arthritis, Sleep apnea, and Morbid obesity. She presented to the ED for evaluation of acute encephalopathy. She was having confusion since 3:00 p.m. yesterday when she called her son. She went to see her primary care physician yesterday morning. She was normal last at 6:00 a.m. As per family. Her son claims she called and sounded confused and did know where she was located. At 6:00 p.m. she was driving [place] when she stopped in the middle of the road. EMS was called for her. Patient claimed she was weak and not feeling well and was asking the same questions repeatedly with inability to remember things.    On arrival to ED adequate neuro exam was done and old stroke was called. NIH stroke scale 2. Patient mainly is confused and has slurring of speech. Lab work pretty unremarkable, case discussed with vascular neurologist who thought this may be stroke mimic and recommended a CTA with was without acute abnormality. CTH negative. Admit to [facility] for further care and neurology evaluation.      Overview/Hospital Course:  No notes on file    Interval History: CVA workup negative, daughter at bedside today, agreeable to SNF placement. States mental status is back to baseline. Replacing K.     Review of Systems   Respiratory:  Negative for shortness of breath.    Cardiovascular:  Negative for chest pain and leg swelling.   Neurological:  Positive for weakness. Negative for dizziness and headaches.   Objective:      Vital Signs (Most Recent):  Temp: 97.9 °F (36.6 °C) (09/25/22 1945)  Pulse: 93 (09/25/22 1948)  Resp: 18 (09/25/22 1948)  BP: (!) 153/70 (09/25/22 1945)  SpO2: 96 % (09/25/22 1948)   Vital Signs (24h Range):  Temp:  [97.6 °F (36.4 °C)-97.9 °F (36.6 °C)] 97.9 °F (36.6 °C)  Pulse:  [68-98] 93  Resp:  [18-20] 18  SpO2:  [92 %-96 %] 96 %  BP: (119-187)/(70-83) 153/70     Weight: 122.5 kg (270 lb)  Body mass index is 46.35 kg/m².    Intake/Output Summary (Last 24 hours) at 9/25/2022 2313  Last data filed at 9/25/2022 0811  Gross per 24 hour   Intake 125 ml   Output 650 ml   Net -525 ml      Physical Exam  Vitals reviewed.   Constitutional:       Appearance: Normal appearance.   HENT:      Head: Normocephalic and atraumatic.   Eyes:      General: No scleral icterus.     Conjunctiva/sclera: Conjunctivae normal.   Pulmonary:      Effort: Pulmonary effort is normal. No respiratory distress.   Skin:     Coloration: Skin is not jaundiced.      Findings: No erythema.   Psychiatric:         Mood and Affect: Mood normal.         Behavior: Behavior normal.       Significant Labs: All pertinent labs within the past 24 hours have been reviewed.  Recent Lab Results         09/25/22  1944 09/25/22  1639   09/25/22  0515   09/25/22  0218        Albumin       3.4       Alkaline Phosphatase       92       ALT       17       Anion Gap       13       AST       24       Baso #       0.04       Basophil %       0.7       BILIRUBIN TOTAL       0.6  Comment: For infants and newborns, interpretation of results should be based  on gestational age, weight and in agreement with clinical  observations.    Premature Infant recommended reference ranges:  Up to 24 hours.............<8.0 mg/dL  Up to 48 hours............<12.0 mg/dL  3-5 days..................<15.0 mg/dL  6-29 days.................<15.0 mg/dL         BUN       15       Calcium       9.5       Chloride       97       CO2       31       Creatinine       0.8       Differential Method        Automated       eGFR       >60       Eos #       0.1       Eosinophil %       1.7       Glucose       229       Gran # (ANC)       2.6       Gran %       48.9       Hematocrit       46.6       Hemoglobin       14.2       Immature Grans (Abs)       0.01  Comment: Mild elevation in immature granulocytes is non specific and   can be seen in a variety of conditions including stress response,   acute inflammation, trauma and pregnancy. Correlation with other   laboratory and clinical findings is essential.         Immature Granulocytes       0.2       Lymph #       2.0       Lymph %       36.6       Magnesium       1.8       MCH       24.8       MCHC       30.5       MCV       82       Mono #       0.6       Mono %       11.9       MPV       SEE COMMENT  Comment: Result not available.       nRBC       0       Platelets       142       POCT Glucose 236   229   278         Potassium       3.3       PROTEIN TOTAL       6.7       RBC       5.72       RDW       14.2       Sodium       141       WBC       5.38               Significant Imaging: I have reviewed all pertinent imaging results/findings within the past 24 hours.      Assessment/Plan:      * Acute encephalopathy  -Evaluated by vascular neurology for stroke  -Per neurologist: Confusion with no focal symptoms. Likely stroke mimic. Obtain CTA  -CTA: No acute abnormality. No high-grade stenosis or major vessel occlusion.  -unsure etiology  -CBG level 230  -CT head negative  -Labs reviewed and noted  -UA & UDS negative  -TSH 1.940  B12, folate, ETOH, ammonia   -Avoid anticholinergics if possible  -Delirium precautions  -consult neurology  -back to baseline    Stroke-like symptoms  -rule out stroke  -CTA brain negative  -CTH negative  -MRI negative  -ECHO   Antithrombotics for secondary stroke prevention: Antiplatelets: Aspirin: 81 mg daily    Statins for secondary stroke prevention and hyperlipidemia, if present:   Statins: Atorvastatin- 40 mg daily    Aggressive  risk factor modification: HTN, DM, HLD, Obesity     Rehab efforts: The patient has been evaluated by a stroke team provider and the therapy needs have been fully considered based off the presenting complaints and exam findings. The following therapy evaluations are needed: None    Diagnostics ordered/pending: CTA Head to assess vasculature , HgbA1C to assess blood glucose levels, Lipid Profile to assess cholesterol levels, MRI head without contrast to assess brain parenchyma, TTE to assess cardiac function/status , TSH to assess thyroid function    VTE prophylaxis: Enoxaparin 40 mg SQ every 24 hours    BP parameters: Infarct: No intervention, SBP <220    Sleep apnea  -CPAP QHS    Morbid obesity  There is no height or weight on file to calculate BMI. Morbid obesity complicates all aspects of disease management from diagnostic modalities to treatment. Weight loss encouraged and health benefits explained to patient.     Hyperlipidemia  -Resume home statin    Type 2 diabetes mellitus without complication, without long-term current use of insulin  Patient's FSGs are uncontrolled due to hyperglycemia on current medication regimen.  Last A1c reviewed-   Lab Results   Component Value Date    HGBA1C 9.2 (H) 05/21/2022     Most recent fingerstick glucose reviewed-   Recent Labs   Lab 09/23/22  1903   POCTGLUCOSE 230*     Current correctional scale  Low  Maintain anti-hyperglycemic dose as follows-   Antihyperglycemics (From admission, onward)    None        Hold Oral hypoglycemics while patient is in the hospital.    Essential hypertension  -hypertensive in ED  -given Antihypertensives in ED  -hold antihypertensives and allow permissive HTN until evaluated by Neurology  -PRN labetalol per stroke protocol      VTE Risk Mitigation (From admission, onward)         Ordered     enoxaparin injection 40 mg  Daily         09/24/22 0332     IP VTE HIGH RISK PATIENT  Once         09/24/22 0333     Place sequential compression device   Until discontinued         09/24/22 0332                      I have completed this tele-visit without the assistance of a telepresenter.    The attending portion of this evaluation, treatment, and documentation was performed per Joie Lazo MD via Telemedicine AudioVisual using the secure [place] [employer] software platform with 2 way audio/video. The provider was located off-site and the patient is located in the hospital. The aforementioned video software was utilized to document the relevant history and physical exam    Joie Lazo MD  Department of [facility]   [facility]

## 2022-09-26 NOTE — ASSESSMENT & PLAN NOTE
-rule out stroke  -CTA brain negative  -CTH negative  -MRI negative  -ECHO   Antithrombotics for secondary stroke prevention: Antiplatelets: Aspirin: 81 mg daily    Statins for secondary stroke prevention and hyperlipidemia, if present:   Statins: Atorvastatin- 40 mg daily    Aggressive risk factor modification: HTN, DM, HLD, Obesity     Rehab efforts: The patient has been evaluated by a stroke team provider and the therapy needs have been fully considered based off the presenting complaints and exam findings. The following therapy evaluations are needed: None    Diagnostics ordered/pending: CTA Head to assess vasculature , HgbA1C to assess blood glucose levels, Lipid Profile to assess cholesterol levels, MRI head without contrast to assess brain parenchyma, TTE to assess cardiac function/status , TSH to assess thyroid function    VTE prophylaxis: Enoxaparin 40 mg SQ every 24 hours    BP parameters: Infarct: No intervention, SBP <220

## 2022-09-26 NOTE — PLAN OF CARE
Problem: Occupational Therapy  Goal: Occupational Therapy Goal  Description: Goals to be met by: 10/24/2022      Patient will increase functional independence with ADLs by performing:    UE Dressing with Supervision.  LE Dressing with Supervision.  Grooming while standing with Supervision.  Toileting from toilet with Supervision for hygiene and clothing management.   Toilet transfer to toilet with Supervision.  Increased functional strength to WFL for self care.  Upper extremity exercise program x10 reps per handout, with independence.     Outcome: Ongoing, Progressing   Pt progressing well towards OT goals. Cont OT POC

## 2022-09-26 NOTE — PLAN OF CARE
[facility] - Telemetry  Discharge Reassessment    Primary Care Provider: Mehrdad Cisneros MD    Expected Discharge Date: 9/24/2022    DC plan updated with daughter via telephone. Agreeable to SNF placement. Barriers discussed. OBS status and financial responsibility communicated to daughter. First choice facility communicated. Auth to be submitted. Pt currently conditionally accepted pending confirmation of closure of open MSP. If not able to confirm, pt could be denied placement at first choice facility.    Reassessment (most recent)       Discharge Reassessment - 09/26/22 1108          Discharge Reassessment    Assessment Type Discharge Planning Reassessment (P)      Did the patient's condition or plan change since previous assessment? No (P)      Discharge Plan discussed with: Adult children (P)      Communicated AKIKO with patient/caregiver Yes (P)      Discharge Plan A Skilled Nursing Facility (P)      Discharge Plan B Home;Home with family;Home Health (P)      DME Needed Upon Discharge  none (P)      Discharge Barriers Identified None (P)      Why the patient remains in the hospital Placement issues (P)    SNF referrals initiated. First choice facility noted. Pt has open MSP from  from 04/08/2015. Info communicated to daughter to confirm closure. PENDING INS AUTH FOR SNF.       Post-Acute Status    Post-Acute Authorization Placement (P)      Post-Acute Placement Status Pending payor review/awaiting authorization (if required) (P)      Hospital Resources/Appts/Education Provided Appointments scheduled and added to AVS;Post-Acute resouces added to AVS (P)      Discharge Delays Post-Acute Set-up (P)

## 2022-09-26 NOTE — PT/OT/SLP PROGRESS
Occupational Therapy   Treatment    Name: Jayne You  MRN: [id number]  Admitting Diagnosis:  Acute encephalopathy       Recommendations:     Discharge Recommendations: nursing facility, skilled  Discharge Equipment Recommendations:   (TBD)  Barriers to discharge:  Decreased caregiver support    Assessment:     Jayne You is a 82 y.o. female with a medical diagnosis of Acute encephalopathy.  She presents with deconditioning. Performance deficits affecting function are weakness, impaired endurance, impaired self care skills, impaired functional mobility, gait instability, decreased lower extremity function, decreased upper extremity function, decreased safety awareness.     Rehab Prognosis:  Good; patient would benefit from acute skilled OT services to address these deficits and reach maximum level of function.       Plan:     Patient to be seen 3 x/week to address the above listed problems via self-care/home management, therapeutic activities, therapeutic exercises  Plan of Care Expires:    Plan of Care Reviewed with: patient    Subjective     Pain/Comfort:  Pain Rating 1: 0/10    Objective:     Communicated with: nsg prior to session.  Patient found HOB elevated with bed alarm, peripheral IV, telemetry upon OT entry to room.    General Precautions: Standard, fall   Orthopedic Precautions:N/A   Braces: N/A  Respiratory Status: Room air     Occupational Performance:     Bed Mobility:    Patient completed Rolling/Turning to Right with contact guard assistance  Patient completed Scooting/Bridging with stand by assistance  Patient completed Supine to Sit with stand by assistance     Functional Mobility/Transfers:  Patient completed Sit <> Stand Transfer with contact guard and mimimum assistance  with  hand-held assist   Patient completed Bed <> Chair Transfer using Step Transfer technique with contact guard assistance with hand-held assist  Functional Mobility: Pt with fair dynamic seated and standing balance.          [facility] 6 Click ADL: 20    Treatment & Education:  Pt educated on role of OT and POC.   Pt performing skills as listed above.     Patient left up in chair with all lines intact, call button in reach, chair alarm on, and nsg notified    GOALS:   Multidisciplinary Problems       Occupational Therapy Goals          Problem: Occupational Therapy    Goal Priority Disciplines Outcome Interventions   Occupational Therapy Goal     OT, PT/OT Ongoing, Progressing    Description: Goals to be met by: 10/24/2022      Patient will increase functional independence with ADLs by performing:    UE Dressing with Supervision.  LE Dressing with Supervision.  Grooming while standing with Supervision.  Toileting from toilet with Supervision for hygiene and clothing management.   Toilet transfer to toilet with Supervision.  Increased functional strength to WFL for self care.  Upper extremity exercise program x10 reps per handout, with independence.                          Time Tracking:     OT Date of Treatment: 09/26/22  OT Start Time: 1210  OT Stop Time: 1228  OT Total Time (min): 18 min    Billable Minutes:Therapeutic Activity 18    OT/KRISTINA: OT     KRISTINA Visit Number: 0    9/26/2022

## 2022-09-26 NOTE — PROGRESS NOTES
Pharmacist Renal Dose Adjustment Note    Jayne You is a 82 y.o. female being treated with the medication enoxaparin    Patient Data:    Vital Signs (Most Recent):  Temp: 98.6 °F (37 °C) (09/26/22 0745)  Pulse: 109 (09/26/22 0800)  Resp: 20 (09/26/22 0800)  BP: (!) 189/100 (09/26/22 0745)  SpO2: 96 % (09/26/22 0800)   Vital Signs (72h Range):  Temp:  [97.2 °F (36.2 °C)-99 °F (37.2 °C)]   Pulse:  []   Resp:  [16-24]   BP: (119-201)/()   SpO2:  [92 %-98 %]      Recent Labs   Lab 09/24/22 2027 09/25/22 0218 09/26/22 0327   CREATININE 0.9 0.8 0.8     Serum creatinine: 0.8 mg/dL 09/26/22 0327  Estimated creatinine clearance: 72.4 mL/min    Medication:enoxaprin dose: 40mg frequency q24h will be changed to medication:enoxaparin dose:40mg frequency:q12h    Pharmacist's Name: Kolby Matthew  Pharmacist's Extension: 2800

## 2022-09-26 NOTE — SUBJECTIVE & OBJECTIVE
Interval History: patient doing well, BP improved after AM meds given. Labs stable. CM working on SNF placement.     Review of Systems   Respiratory:  Negative for shortness of breath.    Cardiovascular:  Negative for chest pain and leg swelling.   Neurological:  Positive for weakness. Negative for dizziness and headaches.   Objective:     Vital Signs (Most Recent):  Temp: 98.4 °F (36.9 °C) (09/26/22 1041)  Pulse: 97 (09/26/22 1207)  Resp: 18 (09/26/22 1041)  BP: (!) 143/67 (09/26/22 1041)  SpO2: 98 % (09/26/22 1041)   Vital Signs (24h Range):  Temp:  [97.5 °F (36.4 °C)-98.6 °F (37 °C)] 98.4 °F (36.9 °C)  Pulse:  [] 97  Resp:  [18-20] 18  SpO2:  [93 %-98 %] 98 %  BP: (133-189)/() 143/67     Weight: 122.5 kg (270 lb)  Body mass index is 46.35 kg/m².    Intake/Output Summary (Last 24 hours) at 9/26/2022 1420  Last data filed at 9/26/2022 0358  Gross per 24 hour   Intake --   Output 250 ml   Net -250 ml        Physical Exam  Vitals reviewed.   Constitutional:       Appearance: Normal appearance.   HENT:      Head: Normocephalic and atraumatic.   Eyes:      General: No scleral icterus.     Conjunctiva/sclera: Conjunctivae normal.   Pulmonary:      Effort: Pulmonary effort is normal. No respiratory distress.   Skin:     Coloration: Skin is not jaundiced.      Findings: No erythema.   Psychiatric:         Mood and Affect: Mood normal.         Behavior: Behavior normal.       Significant Labs: All pertinent labs within the past 24 hours have been reviewed.  Recent Lab Results  (Last 5 results in the past 24 hours)        09/26/22  1040   09/26/22  0327   09/26/22  0320   09/25/22  1944   09/25/22  1639        Anion Gap   11             BUN   12             Calcium   9.4             Chloride   98             CO2   30             Creatinine   0.8             eGFR   >60             Glucose   218             POCT Glucose 284     196   236   229       Potassium   3.8             Sodium   139                                     Significant Imaging: I have reviewed all pertinent imaging results/findings within the past 24 hours.

## 2022-09-26 NOTE — PT/OT/SLP PROGRESS
Physical Therapy      Patient Name:  Jayne You   MRN:  [id number]    Patient not seen today secondary to  (pt declined rx secondary to burning sensation with urinating). Will follow-up tomorrow.

## 2022-09-26 NOTE — PLAN OF CARE
09/26/22 1544   Post-Acute Status   Post-Acute Authorization Placement   Post-Acute Placement Status Pending payor review/awaiting authorization (if required)  (Discussed with pt. She does not want to go to [facility]. Other facilities communicated. Agreeable to [facility]. Intake forms for [facility] at bedside. Admissions team to submit for auth. PENDING INS AUTH. [id number] OBTAINED.)

## 2022-09-26 NOTE — PLAN OF CARE
Pt is alert and oriented x4. Breathing on room air.    Pt can see better with glasses. No c/o peripheral vision being blurry. No c/o headache noted. However, pt did c/o back pain rating of 8 on a 0-10 scale. Administered acetaminophen and stated pain went down to a 4.     Had a bowel movement this morning. Stool was soft and firm. Tried to change purewick but purewick was out of storage so pt is currently not on external catheter anymore. Will continue to monitor.

## 2022-09-26 NOTE — ASSESSMENT & PLAN NOTE
-Evaluated by vascular neurology for stroke  -Per neurologist: Confusion with no focal symptoms. Likely stroke mimic. Obtain CTA  -CTA: No acute abnormality. No high-grade stenosis or major vessel occlusion.  -unsure etiology  -CBG level 230  -CT head negative  -Labs reviewed and noted  -UA & UDS negative  -TSH 1.940  B12, folate, ETOH, ammonia   -Avoid anticholinergics if possible  -Delirium precautions  -consult neurology  -back to baseline

## 2022-09-26 NOTE — PLAN OF CARE
"   09/26/22 0933   Post-Acute Status   Post-Acute Authorization Placement   Post-Acute Placement Status Referrals Sent  (CM initiated SNF referrals via [facility]. Choices to be collected from discussion with pt/family.)     No future appointments.    BP (!) 189/100 (BP Location: Right arm, Patient Position: Lying)   Pulse 109   Temp 98.6 °F (37 °C) (Oral)   Resp 20   Ht 5' 4" (1.626 m)   Wt 122.5 kg (270 lb)   SpO2 96%   BMI 46.35 kg/m²      amLODIPine  10 mg Oral Daily    aspirin  81 mg Oral Daily    atorvastatin  40 mg Oral Daily    DULoxetine  30 mg Oral Daily    enoxaparin  40 mg Subcutaneous Daily    losartan  25 mg Oral Daily       "

## 2022-09-27 DIAGNOSIS — I10 HYPERTENSION, UNSPECIFIED TYPE: Primary | ICD-10-CM

## 2022-09-27 PROBLEM — N39.0 UTI (URINARY TRACT INFECTION): Status: ACTIVE | Noted: 2022-09-27

## 2022-09-27 LAB
POCT GLUCOSE: 238 MG/DL (ref 70–110)
POCT GLUCOSE: 240 MG/DL (ref 70–110)

## 2022-09-27 PROCEDURE — 96366 THER/PROPH/DIAG IV INF ADDON: CPT

## 2022-09-27 PROCEDURE — 63600175 PHARM REV CODE 636 W HCPCS: Performed by: STUDENT IN AN ORGANIZED HEALTH CARE EDUCATION/TRAINING PROGRAM

## 2022-09-27 PROCEDURE — 94761 N-INVAS EAR/PLS OXIMETRY MLT: CPT

## 2022-09-27 PROCEDURE — 97116 GAIT TRAINING THERAPY: CPT | Mod: CQ

## 2022-09-27 PROCEDURE — 25000003 PHARM REV CODE 250: Performed by: NURSE PRACTITIONER

## 2022-09-27 PROCEDURE — 86580 TB INTRADERMAL TEST: CPT | Performed by: STUDENT IN AN ORGANIZED HEALTH CARE EDUCATION/TRAINING PROGRAM

## 2022-09-27 PROCEDURE — 99900035 HC TECH TIME PER 15 MIN (STAT)

## 2022-09-27 PROCEDURE — 30200315 PPD INTRADERMAL TEST REV CODE 302: Performed by: STUDENT IN AN ORGANIZED HEALTH CARE EDUCATION/TRAINING PROGRAM

## 2022-09-27 PROCEDURE — 25000003 PHARM REV CODE 250: Performed by: STUDENT IN AN ORGANIZED HEALTH CARE EDUCATION/TRAINING PROGRAM

## 2022-09-27 PROCEDURE — 96372 THER/PROPH/DIAG INJ SC/IM: CPT | Mod: 59 | Performed by: STUDENT IN AN ORGANIZED HEALTH CARE EDUCATION/TRAINING PROGRAM

## 2022-09-27 PROCEDURE — 97110 THERAPEUTIC EXERCISES: CPT | Mod: CQ

## 2022-09-27 PROCEDURE — G0378 HOSPITAL OBSERVATION PER HR: HCPCS

## 2022-09-27 PROCEDURE — 97535 SELF CARE MNGMENT TRAINING: CPT

## 2022-09-27 PROCEDURE — 25000003 PHARM REV CODE 250: Performed by: INTERNAL MEDICINE

## 2022-09-27 RX ORDER — TRAMADOL HYDROCHLORIDE 50 MG/1
50 TABLET ORAL EVERY 6 HOURS PRN
Status: DISCONTINUED | OUTPATIENT
Start: 2022-09-27 | End: 2022-09-28

## 2022-09-27 RX ORDER — GABAPENTIN 300 MG/1
300 CAPSULE ORAL 3 TIMES DAILY
Status: DISCONTINUED | OUTPATIENT
Start: 2022-09-27 | End: 2022-09-28 | Stop reason: HOSPADM

## 2022-09-27 RX ADMIN — TUBERCULIN PURIFIED PROTEIN DERIVATIVE 5 UNITS: 5 INJECTION, SOLUTION INTRADERMAL at 10:09

## 2022-09-27 RX ADMIN — INSULIN ASPART 2 UNITS: 100 INJECTION, SOLUTION INTRAVENOUS; SUBCUTANEOUS at 07:09

## 2022-09-27 RX ADMIN — LOSARTAN POTASSIUM 25 MG: 25 TABLET, FILM COATED ORAL at 08:09

## 2022-09-27 RX ADMIN — GABAPENTIN 300 MG: 300 CAPSULE ORAL at 08:09

## 2022-09-27 RX ADMIN — ENOXAPARIN SODIUM 40 MG: 100 INJECTION SUBCUTANEOUS at 08:09

## 2022-09-27 RX ADMIN — ASPIRIN 81 MG: 81 TABLET, CHEWABLE ORAL at 08:09

## 2022-09-27 RX ADMIN — INSULIN ASPART 2 UNITS: 100 INJECTION, SOLUTION INTRAVENOUS; SUBCUTANEOUS at 11:09

## 2022-09-27 RX ADMIN — CEFTRIAXONE 1 G: 1 INJECTION, SOLUTION INTRAVENOUS at 09:09

## 2022-09-27 RX ADMIN — HYDROCHLOROTHIAZIDE 12.5 MG: 12.5 TABLET ORAL at 08:09

## 2022-09-27 RX ADMIN — GABAPENTIN 300 MG: 300 CAPSULE ORAL at 03:09

## 2022-09-27 RX ADMIN — AMLODIPINE BESYLATE 10 MG: 5 TABLET ORAL at 08:09

## 2022-09-27 RX ADMIN — DULOXETINE 30 MG: 30 CAPSULE, DELAYED RELEASE ORAL at 08:09

## 2022-09-27 RX ADMIN — GABAPENTIN 300 MG: 300 CAPSULE ORAL at 10:09

## 2022-09-27 RX ADMIN — ATORVASTATIN CALCIUM 40 MG: 40 TABLET, FILM COATED ORAL at 08:09

## 2022-09-27 NOTE — HOSPITAL COURSE
Patient admitted with stroke like symptoms however CVA workup was negative. PT/OT consulted and recommended SNF placement which patient and daughter were agreeable to. Patient complained of dysuria on 9/26/22 prompting U/A which revealed UTI and patient started on IV Ceftriaxone while urine culture in process. Discharged on PO ciprofloxacin.

## 2022-09-27 NOTE — NURSING
Pt remained AAox4 with VSS and no acute distress noted. She remained at neurological baseline. Purposeful rounding completed and safety precautions maintained. Family and patient updated on POC and verbalized understanding. Will continue to monitor for any changes.

## 2022-09-27 NOTE — PLAN OF CARE
09/27/22 1533   Post-Acute Status   Post-Acute Authorization Placement   Post-Acute Placement Status Pending payor review/awaiting authorization (if required)  (No auth yet. Valentin Zaragoza. Facility on confirms that they did receive a letter from [facility] but it was a request for additional info. The AC (admission coordinator) is out and business office is only to retrieve auth.  Info faxed to [facility] [phone])   Discharge Delays (!) Post-Acute Set-up

## 2022-09-27 NOTE — PLAN OF CARE
Problem: Adult Inpatient Plan of Care  Goal: Plan of Care Review  Outcome: Ongoing, Progressing   Patient is alert, oriented X4. Care plan explained to patient, she verbalized understanding. Pt complaint right foot numbness and tingling. Other than that, no sign and symptoms of stroke noted.     On room air, O2 sat maintain 98%, no respiratory distress noted. On cardiac monitor, running normal sinus rhythm.     Deny nausea/vomiting/diarrhea. No headache complaint. Due medications given.     received phone call from insurance company tonight. they looked at the chart, and noted pt has urinary infection. and there is no meds for her, they are worried she won't be able to discharge to nursing home . Notified NP. Hubbels, IV Rocephin ordered and given.     Maintain fall risk precaution, bed in lowest position, bed alarm on. Call light/personal items in reach. Instructed patient call for help as needed. Will continue to monitor.

## 2022-09-27 NOTE — PROGRESS NOTES
[facility] Medicine  Telemedicine Progress Note    Patient Name: Jayne You  MRN: [id number]  Patient Class: OP- Observation   Admission Date: 9/23/2022  Length of Stay: 1 days  Attending Physician: Joie Lazo MD  Primary Care Provider: Mehrdad Cisneros MD          Subjective:     Principal Problem:Acute encephalopathy        HPI:  Jayne You is an 82-year-old female with a history of diabetes, cardiomyopathy, hypertension, Back pain, Hyperlipidemia, Arthritis, Sleep apnea, and Morbid obesity. She presented to the ED for evaluation of acute encephalopathy. She was having confusion since 3:00 p.m. yesterday when she called her son. She went to see her primary care physician yesterday morning. She was normal last at 6:00 a.m. As per family. Her son claims she called and sounded confused and did know where she was located. At 6:00 p.m. she was driving [place] when she stopped in the middle of the road. EMS was called for her. Patient claimed she was weak and not feeling well and was asking the same questions repeatedly with inability to remember things.    On arrival to ED adequate neuro exam was done and old stroke was called. NIH stroke scale 2. Patient mainly is confused and has slurring of speech. Lab work pretty unremarkable, case discussed with vascular neurologist who thought this may be stroke mimic and recommended a CTA with was without acute abnormality. CTH negative. Admit to [facility] for further care and neurology evaluation.      Overview/Hospital Course:  Patient admitted with stroke like symptoms however CVA workup was negative. PT/OT consulted and recommended SNF placement which patient and daughter were agreeable to. Patient complained of dysuria on 9/26/22 prompting U/A which revealed UTI and patient started on IV Ceftriaxone while urine culture in process.       Interval History: Patient complained of dysuria yesterday afternoon prompting  U/A which revealed UTI and patient started on IV Ceftriaxone while urine culture in process. No longer having dysuria today. Waiting on insurance authorization for SNF placement.     Review of Systems   Respiratory:  Negative for shortness of breath.    Cardiovascular:  Negative for chest pain and leg swelling.   Neurological:  Positive for weakness. Negative for dizziness and headaches.   Objective:     Vital Signs (Most Recent):  Temp: 97.9 °F (36.6 °C) (09/27/22 0745)  Pulse: 89 (09/27/22 0745)  Resp: 18 (09/27/22 0745)  BP: (!) 135/58 (09/27/22 0745)  SpO2: 97 % (09/27/22 0745)   Vital Signs (24h Range):  Temp:  [97 °F (36.1 °C)-98.6 °F (37 °C)] 97.9 °F (36.6 °C)  Pulse:  [] 89  Resp:  [18] 18  SpO2:  [93 %-98 %] 97 %  BP: (135-168)/(58-87) 135/58     Weight: 122.5 kg (270 lb)  Body mass index is 46.35 kg/m².    Intake/Output Summary (Last 24 hours) at 9/27/2022 0954  Last data filed at 9/27/2022 0600  Gross per 24 hour   Intake --   Output 600 ml   Net -600 ml        Physical Exam  Vitals reviewed.   Constitutional:       Appearance: Normal appearance.   HENT:      Head: Normocephalic and atraumatic.   Eyes:      General: No scleral icterus.     Conjunctiva/sclera: Conjunctivae normal.   Pulmonary:      Effort: Pulmonary effort is normal. No respiratory distress.   Skin:     Coloration: Skin is not jaundiced.      Findings: No erythema.   Psychiatric:         Mood and Affect: Mood normal.         Behavior: Behavior normal.       Significant Labs: All pertinent labs within the past 24 hours have been reviewed.  Recent Lab Results         09/27/22  0717   09/26/22  1936   09/26/22  1550   09/26/22  1040        Appearance, UA     Cloudy         Bacteria, UA     Occasional         Bilirubin (UA)     Negative         Color, UA     Yellow         Glucose, UA     Negative         Hyaline Casts, UA     0         Ketones, UA     Negative         Leukocytes, UA     3+         Microscopic Comment     SEE  COMMENT  Comment: Other formed elements not mentioned in the report are not   present in the microscopic examination.            NITRITE UA     Negative         Occult Blood UA     3+         pH, UA     8.0         POCT Glucose 240   294     284       Protein, UA     1+  Comment: Recommend a 24 hour urine protein or a urine   protein/creatinine ratio if globulin induced proteinuria is  clinically suspected.           RBC, UA     >100         Specific Gravity, UA     1.020         Specimen UA     Urine, Clean Catch         Squam Epithel, UA     3         UROBILINOGEN UA     Negative         WBC, UA     >100                 Significant Imaging: I have reviewed all pertinent imaging results/findings within the past 24 hours.      Assessment/Plan:      * Acute encephalopathy  -Evaluated by vascular neurology for stroke  -Per neurologist: Confusion with no focal symptoms. Likely stroke mimic. Obtain CTA  -CTA: No acute abnormality. No high-grade stenosis or major vessel occlusion.  -unsure etiology  -CBG level 230  -CT head negative  -Labs reviewed and noted  -UA & UDS negative  -TSH 1.940  B12, folate, ETOH, ammonia   -Avoid anticholinergics if possible  -Delirium precautions  -consult neurology  -back to baseline    UTI (urinary tract infection)  Patient complained of dysuria on 9/26/22 prompting U/A which revealed UTI and patient started on IV Ceftriaxone while urine culture in process.       Stroke-like symptoms  -rule out stroke  -CTA brain negative  -CTH negative  -MRI negative  -ECHO   Antithrombotics for secondary stroke prevention: Antiplatelets: Aspirin: 81 mg daily    Statins for secondary stroke prevention and hyperlipidemia, if present:   Statins: Atorvastatin- 40 mg daily    Aggressive risk factor modification: HTN, DM, HLD, Obesity     Rehab efforts: The patient has been evaluated by a stroke team provider and the therapy needs have been fully considered based off the presenting complaints and exam  findings. The following therapy evaluations are needed: None    Diagnostics ordered/pending: CTA Head to assess vasculature , HgbA1C to assess blood glucose levels, Lipid Profile to assess cholesterol levels, MRI head without contrast to assess brain parenchyma, TTE to assess cardiac function/status , TSH to assess thyroid function    VTE prophylaxis: Enoxaparin 40 mg SQ every 24 hours    BP parameters: Infarct: No intervention, SBP <220    Sleep apnea  -CPAP QHS    Morbid obesity  There is no height or weight on file to calculate BMI. Morbid obesity complicates all aspects of disease management from diagnostic modalities to treatment. Weight loss encouraged and health benefits explained to patient.     Hyperlipidemia  -Resume home statin    Type 2 diabetes mellitus without complication, without long-term current use of insulin  Patient's FSGs are uncontrolled due to hyperglycemia on current medication regimen.  Last A1c reviewed-   Lab Results   Component Value Date    HGBA1C 9.2 (H) 05/21/2022     Most recent fingerstick glucose reviewed-   Recent Labs   Lab 09/23/22  1903   POCTGLUCOSE 230*     Current correctional scale  Low  Maintain anti-hyperglycemic dose as follows-   Antihyperglycemics (From admission, onward)    None        Hold Oral hypoglycemics while patient is in the hospital.    Essential hypertension  -hypertensive in ED  -given Antihypertensives in ED  -hold antihypertensives and allow permissive HTN until evaluated by Neurology  -PRN labetalol per stroke protocol      VTE Risk Mitigation (From admission, onward)         Ordered     enoxaparin injection 40 mg  Every 12 hours         09/26/22 1009     IP VTE HIGH RISK PATIENT  Once         09/24/22 0333     Place sequential compression device  Until discontinued         09/24/22 0332                      I have completed this tele-visit without the assistance of a telepresenter.    The attending portion of this evaluation, treatment, and  documentation was performed per Joie Lazo MD via Telemedicine AudioVisual using the secure IQMS software platform with 2 way audio/video. The provider was located off-site and the patient is located in the hospital. The aforementioned video software was utilized to document the relevant history and physical exam    Joie Lazo MD  Department of [facility]   [facility]

## 2022-09-27 NOTE — ASSESSMENT & PLAN NOTE
Patient complained of dysuria on 9/26/22 prompting U/A which revealed UTI and patient started on IV Ceftriaxone while urine culture in process.

## 2022-09-27 NOTE — PT/OT/SLP PROGRESS
Physical Therapy Treatment    Patient Name:  Jayne You   MRN:  [id number]    Recommendations:     Discharge Recommendations:  nursing facility, skilled   Discharge Equipment Recommendations:  (TBD)   Barriers to discharge: Decreased caregiver support and decreased mobility,strength and endurance.    Assessment:     Jayne You is a 82 y.o. female admitted with a medical diagnosis of Acute encephalopathy.  She presents with the following impairments/functional limitations:  weakness, impaired endurance, impaired functional mobility, gait instability, impaired balance, decreased lower extremity function, decreased ROM, impaired coordination, impaired skin,pt with good participation,pt live alone and will benefit from SNF upon discharge.    Rehab Prognosis: Good; patient would benefit from acute skilled PT services to address these deficits and reach maximum level of function.    Recent Surgery: * No surgery found *      Plan:     During this hospitalization, patient to be seen 6 x/week to address the identified rehab impairments via gait training, therapeutic activities, therapeutic exercises, neuromuscular re-education and progress toward the following goals:    Plan of Care Expires:  10/24/22    Subjective     Chief Complaint: n/a  Patient/Family Comments/goals: pt is moving a little better.  Pain/Comfort:  Pain Rating 1:  (no c/o's)      Objective:     Communicated with nsg prior to session.  Patient found sitting edge of bed with PureWick, peripheral IV, telemetry upon PT entry to room.     General Precautions: Standard, fall   Orthopedic Precautions:N/A   Braces: N/A  Respiratory Status: Room air     Functional Mobility:  Transfers:     Sit to Stand:  stand by assistance with rolling walker  Gait: amb ~40' with RW and SBA  Balance: fair standing balance with RW      AM-PAC 6 CLICK MOBILITY  Turning over in bed (including adjusting bedclothes, sheets and blankets)?: 3  Sitting down on and standing up from a  chair with arms (e.g., wheelchair, bedside commode, etc.): 3  Moving from lying on back to sitting on the side of the bed?: 2  Moving to and from a bed to a chair (including a wheelchair)?: 3  Need to walk in hospital room?: 3  Climbing 3-5 steps with a railing?: 2  Basic Mobility Total Score: 16       Therapeutic Activities and Exercises: le seated ex's X 10-12 reps inc: ap,laq's and hip flex.       Patient left sitting edge of bed with all lines intact, call button in reach, and daughter present..    GOALS: see general POC  Multidisciplinary Problems       Physical Therapy Goals          Problem: Physical Therapy    Goal Priority Disciplines Outcome Goal Variances Interventions   Physical Therapy Goal     PT, PT/OT Ongoing, Progressing     Description: Goals to be met by: 10/24/2022     Patient will increase functional independence with mobility by performin. Supine <> sit with Stand-by Assistance  2. Sit<>stand transfer with SBA  3. Bed <>w/c  transfer with SBA using rw  4. Ambulation 75 ft using a rw with SBA                       Time Tracking:     PT Received On: 22  PT Start Time: 1327     PT Stop Time: 1351  PT Total Time (min): 24 min     Billable Minutes: Gait Training 14 and Therapeutic Exercise 10    Treatment Type: Treatment  PT/PTA: PTA     PTA Visit Number: 1     2022

## 2022-09-27 NOTE — SUBJECTIVE & OBJECTIVE
Interval History: Patient complained of dysuria yesterday afternoon prompting U/A which revealed UTI and patient started on IV Ceftriaxone while urine culture in process. No longer having dysuria today. Waiting on insurance authorization for SNF placement.     Review of Systems   Respiratory:  Negative for shortness of breath.    Cardiovascular:  Negative for chest pain and leg swelling.   Neurological:  Positive for weakness. Negative for dizziness and headaches.   Objective:     Vital Signs (Most Recent):  Temp: 97.9 °F (36.6 °C) (09/27/22 0745)  Pulse: 89 (09/27/22 0745)  Resp: 18 (09/27/22 0745)  BP: (!) 135/58 (09/27/22 0745)  SpO2: 97 % (09/27/22 0745)   Vital Signs (24h Range):  Temp:  [97 °F (36.1 °C)-98.6 °F (37 °C)] 97.9 °F (36.6 °C)  Pulse:  [] 89  Resp:  [18] 18  SpO2:  [93 %-98 %] 97 %  BP: (135-168)/(58-87) 135/58     Weight: 122.5 kg (270 lb)  Body mass index is 46.35 kg/m².    Intake/Output Summary (Last 24 hours) at 9/27/2022 0954  Last data filed at 9/27/2022 0600  Gross per 24 hour   Intake --   Output 600 ml   Net -600 ml        Physical Exam  Vitals reviewed.   Constitutional:       Appearance: Normal appearance.   HENT:      Head: Normocephalic and atraumatic.   Eyes:      General: No scleral icterus.     Conjunctiva/sclera: Conjunctivae normal.   Pulmonary:      Effort: Pulmonary effort is normal. No respiratory distress.   Skin:     Coloration: Skin is not jaundiced.      Findings: No erythema.   Psychiatric:         Mood and Affect: Mood normal.         Behavior: Behavior normal.       Significant Labs: All pertinent labs within the past 24 hours have been reviewed.  Recent Lab Results         09/27/22  0717   09/26/22  1936   09/26/22  1550   09/26/22  1040        Appearance, UA     Cloudy         Bacteria, UA     Occasional         Bilirubin (UA)     Negative         Color, UA     Yellow         Glucose, UA     Negative         Hyaline Casts, UA     0         Ketones, UA      Negative         Leukocytes, UA     3+         Microscopic Comment     SEE COMMENT  Comment: Other formed elements not mentioned in the report are not   present in the microscopic examination.            NITRITE UA     Negative         Occult Blood UA     3+         pH, UA     8.0         POCT Glucose 240   294     284       Protein, UA     1+  Comment: Recommend a 24 hour urine protein or a urine   protein/creatinine ratio if globulin induced proteinuria is  clinically suspected.           RBC, UA     >100         Specific Gravity, UA     1.020         Specimen UA     Urine, Clean Catch         Squam Epithel, UA     3         UROBILINOGEN UA     Negative         WBC, UA     >100                 Significant Imaging: I have reviewed all pertinent imaging results/findings within the past 24 hours.

## 2022-09-27 NOTE — PLAN OF CARE
09/27/22 1126   Post-Acute Status   Post-Acute Authorization Placement   Post-Acute Placement Status Pending payor review/awaiting authorization (if required)  (Pending ins auth for SNF from [facility]. Intake forms completed and returned to facility. Pt was reported to possibly have UTI last night and started on IV CTX. NOT MEDICALLY READY FOR DC. Pending urine cultures results. PENDING INS AUTH FOR SNF.)

## 2022-09-28 VITALS
WEIGHT: 270 LBS | HEART RATE: 96 BPM | OXYGEN SATURATION: 94 % | SYSTOLIC BLOOD PRESSURE: 144 MMHG | HEIGHT: 64 IN | TEMPERATURE: 97 F | BODY MASS INDEX: 46.1 KG/M2 | RESPIRATION RATE: 18 BRPM | DIASTOLIC BLOOD PRESSURE: 70 MMHG

## 2022-09-28 LAB
ANION GAP SERPL CALC-SCNC: 13 MMOL/L (ref 8–16)
BACTERIA UR CULT: ABNORMAL
BUN SERPL-MCNC: 24 MG/DL (ref 8–23)
CALCIUM SERPL-MCNC: 9.4 MG/DL (ref 8.7–10.5)
CHLORIDE SERPL-SCNC: 96 MMOL/L (ref 95–110)
CO2 SERPL-SCNC: 27 MMOL/L (ref 23–29)
CREAT SERPL-MCNC: 1.2 MG/DL (ref 0.5–1.4)
EST. GFR  (NO RACE VARIABLE): 46 ML/MIN/1.73 M^2
GLUCOSE SERPL-MCNC: 246 MG/DL (ref 70–110)
POCT GLUCOSE: 237 MG/DL (ref 70–110)
POTASSIUM SERPL-SCNC: 3.5 MMOL/L (ref 3.5–5.1)
SODIUM SERPL-SCNC: 136 MMOL/L (ref 136–145)

## 2022-09-28 PROCEDURE — 80048 BASIC METABOLIC PNL TOTAL CA: CPT | Performed by: STUDENT IN AN ORGANIZED HEALTH CARE EDUCATION/TRAINING PROGRAM

## 2022-09-28 PROCEDURE — 96372 THER/PROPH/DIAG INJ SC/IM: CPT | Mod: 59 | Performed by: STUDENT IN AN ORGANIZED HEALTH CARE EDUCATION/TRAINING PROGRAM

## 2022-09-28 PROCEDURE — 25000003 PHARM REV CODE 250: Performed by: INTERNAL MEDICINE

## 2022-09-28 PROCEDURE — 25000003 PHARM REV CODE 250: Performed by: STUDENT IN AN ORGANIZED HEALTH CARE EDUCATION/TRAINING PROGRAM

## 2022-09-28 PROCEDURE — 97116 GAIT TRAINING THERAPY: CPT | Mod: CQ

## 2022-09-28 PROCEDURE — 99900035 HC TECH TIME PER 15 MIN (STAT)

## 2022-09-28 PROCEDURE — 25000003 PHARM REV CODE 250: Performed by: NURSE PRACTITIONER

## 2022-09-28 PROCEDURE — G0378 HOSPITAL OBSERVATION PER HR: HCPCS

## 2022-09-28 PROCEDURE — 94761 N-INVAS EAR/PLS OXIMETRY MLT: CPT

## 2022-09-28 PROCEDURE — 63600175 PHARM REV CODE 636 W HCPCS: Performed by: STUDENT IN AN ORGANIZED HEALTH CARE EDUCATION/TRAINING PROGRAM

## 2022-09-28 PROCEDURE — 36415 COLL VENOUS BLD VENIPUNCTURE: CPT | Performed by: STUDENT IN AN ORGANIZED HEALTH CARE EDUCATION/TRAINING PROGRAM

## 2022-09-28 PROCEDURE — 97110 THERAPEUTIC EXERCISES: CPT | Mod: CQ

## 2022-09-28 RX ORDER — CIPROFLOXACIN 500 MG/1
500 TABLET ORAL EVERY 12 HOURS
Qty: 6 TABLET | Refills: 0 | Status: SHIPPED | OUTPATIENT
Start: 2022-09-28 | End: 2022-10-01

## 2022-09-28 RX ORDER — TRAMADOL HYDROCHLORIDE 50 MG/1
50 TABLET ORAL EVERY 6 HOURS PRN
Status: DISCONTINUED | OUTPATIENT
Start: 2022-09-28 | End: 2022-09-28 | Stop reason: HOSPADM

## 2022-09-28 RX ORDER — ROSUVASTATIN CALCIUM 20 MG/1
20 TABLET, COATED ORAL DAILY
Qty: 90 TABLET | Refills: 0 | Status: SHIPPED | OUTPATIENT
Start: 2022-09-28 | End: 2022-12-27

## 2022-09-28 RX ADMIN — INSULIN ASPART 2 UNITS: 100 INJECTION, SOLUTION INTRAVENOUS; SUBCUTANEOUS at 12:09

## 2022-09-28 RX ADMIN — ASPIRIN 81 MG: 81 TABLET, CHEWABLE ORAL at 09:09

## 2022-09-28 RX ADMIN — LOSARTAN POTASSIUM 25 MG: 25 TABLET, FILM COATED ORAL at 09:09

## 2022-09-28 RX ADMIN — GABAPENTIN 300 MG: 300 CAPSULE ORAL at 09:09

## 2022-09-28 RX ADMIN — AMLODIPINE BESYLATE 10 MG: 5 TABLET ORAL at 09:09

## 2022-09-28 RX ADMIN — ATORVASTATIN CALCIUM 40 MG: 40 TABLET, FILM COATED ORAL at 09:09

## 2022-09-28 RX ADMIN — HYDROCHLOROTHIAZIDE 12.5 MG: 12.5 TABLET ORAL at 09:09

## 2022-09-28 RX ADMIN — DULOXETINE 30 MG: 30 CAPSULE, DELAYED RELEASE ORAL at 09:09

## 2022-09-28 RX ADMIN — ENOXAPARIN SODIUM 40 MG: 100 INJECTION SUBCUTANEOUS at 09:09

## 2022-09-28 NOTE — PLAN OF CARE
Problem: Adult Inpatient Plan of Care  Goal: Plan of Care Review  Outcome: Ongoing, Progressing   Patient is alert, oriented X4. Care plan explained to patient, he verbalized understanding.     On room air, O2 sat maintain 93%, no respiratory distress noted. On cardiac monitor, running normal sinus rhythm.     Deny nausea/vomiting/diarrhea. No pain complaint. Due medications given. Pt refused to check her blood sugar because we poke her finger too much.     Maintain fall risk precaution, bed in lowest position, bed alarm on. Call light/personal items in reach. Purewick in place. Instructed patient call for help as needed. Will continue to monitor.

## 2022-09-28 NOTE — PLAN OF CARE
"[facility] - Telemetry  Discharge Final Note    Primary Care Provider: Mehrdad Cisneros MD    Expected Discharge Date: 9/28/2022    Pharmacist will go over home medications and reasons for medications. VN and bedside nurse to reiterate final discharge instructions.     Cleared from CM . Bedside Nurse and VN notified.      Final Discharge Note (most recent)       Final Note - 09/28/22 1104          Final Note    Assessment Type Final Discharge Note (P)      Anticipated Discharge Disposition Home-Health Care Svc (P)      Hospital Resources/Appts/Education Provided Appointments scheduled and added to AVS;Post-Acute resouces added to AVS (P)         Post-Acute Status    Post-Acute Authorization Placement;Home Health (P)      Post-Acute Placement Status Discharge Plan Changed (P)    P2P was offered. Daughter is declining to pursue at this time. Pt eager for DC. Agreeable to HH. Agreeable to [facility]. Referral sent. Intake alerted to DC for today.    Home Health Status Pending Clinical Review (P)      Discharge Delays None known at this time (P)                      Future Appointments   Date Time Provider Department Center   10/11/2022  9:30 AM Elaine Padilla MD [facility]         Contact Info       [facility]   Specialty: [facility]    [address], [address]  [place] [address]   Phone: [phone]       Next Steps: Go on 10/11/2022    Instructions: FOLLOW UP WITH PCP AFTER [facility] APPT          BP (!) 144/70 (BP Location: Right arm, Patient Position: Sitting)   Pulse 96   Temp 97.1 °F (36.2 °C) (Oral)   Resp 18   Ht 5' 4" (1.626 m)   Wt 122.5 kg (270 lb)   SpO2 (!) 94%   BMI 46.35 kg/m²        Medication List        START taking these medications      ciprofloxacin HCl 500 MG tablet  Commonly known as: CIPRO  Take 1 tablet (500 mg total) by mouth every 12 (twelve) hours. for 3 days            CHANGE how you take these medications      rosuvastatin " 20 MG tablet  Commonly known as: CRESTOR  Take 1 tablet (20 mg total) by mouth once daily.  What changed:   medication strength  how much to take            CONTINUE taking these medications      acetaminophen 650 MG Tbsr  Commonly known as: TYLENOL  Tylenol arthritis 650 mg by mouth twice daily as needed for pain.     albuterol 90 mcg/actuation inhaler  Commonly known as: PROVENTIL/VENTOLIN HFA     albuterol-ipratropium 2.5 mg-0.5 mg/3 mL nebulizer solution  Commonly known as: DUO-NEB     amLODIPine 10 MG tablet  Commonly known as: NORVASC     aspirin 81 MG Chew  Take 1 tablet (81 mg total) by mouth once daily.     baclofen 10 MG tablet  Commonly known as: LIORESAL  Take 1 tablet (10 mg total) by mouth every evening.     benzonatate 100 MG capsule  Commonly known as: TESSALON     diclofenac sodium 1 % Gel  Commonly known as: VOLTAREN  Apply 2 g topically 2 (two) times daily. To left shoulder     DULoxetine 30 MG capsule  Commonly known as: CYMBALTA     ergocalciferol 50,000 unit Cap  Commonly known as: ERGOCALCIFEROL     furosemide 20 MG tablet  Commonly known as: LASIX  Take 1 tablet (20 mg total) by mouth 2 (two) times a day.     gabapentin 300 MG capsule  Commonly known as: NEURONTIN     glipiZIDE 5 MG tablet  Commonly known as: GLUCOTROL     HYDROcodone-acetaminophen  mg per tablet  Commonly known as: NORCO     levocetirizine 5 MG tablet  Commonly known as: XYZAL     losartan 100 MG tablet  Commonly known as: COZAAR     melatonin 3 mg tablet  Commonly known as: MELATIN  Take 2 tablets (6 mg total) by mouth nightly as needed for Insomnia.     neomycin-polymyxin-hydrocortisone 3.5-10,000-1 mg/mL-unit/mL-% otic suspension  Commonly known as: CORTISPORIN     omeprazole 20 MG capsule  Commonly known as: PRILOSEC     prednisoLONE acetate 1 % Drps  Commonly known as: PRED FORTE     pregabalin 100 MG capsule  Commonly known as: LYRICA     traMADoL 50 mg tablet  Commonly known as: ULTRAM     zolpidem 10 mg  Tab  Commonly known as: AMBIEN            STOP taking these medications      amoxicillin 500 MG capsule  Commonly known as: AMOXIL     hydroCHLOROthiazide 12.5 MG Tab  Commonly known as: HYDRODIURIL               Where to Get Your Medications        These medications were sent to [facility] [address] [address]      Hours: Mon-Fri, 8a-5:30p Phone: [phone]   ciprofloxacin HCl 500 MG tablet  rosuvastatin 20 MG tablet       [id number] - [place] - [address]  [address]  [address] [place] [address]      Hours: 24-hours Phone: [phone]   aspirin 81 MG Chew  furosemide 20 MG tablet

## 2022-09-28 NOTE — TREATMENT PLAN
[facility] RN met with patient regarding [facility] hospital follow up upon discharge. Pt agreeable to hospital follow up .RN informed pt of scheduled appointment and that appointment will also appear on d/c AVS. Patient informed to bring portable home O2 if used and all medication bottles to [facility] follow up appointment.  [facility] information handout, appointment letter and folder provided to patient. Pt's daughter will provide transportation to appointment.    PCC appointment and clinic info discussed with patient's daughter Teresa via phone.      Patient Contact info:[phone]    Person providing transportation contact info: [place],Shawne (Daughter)   [phone] (Mobile)    Barriers to attending [facility] visit: none    Future Appointments   Date Time Provider Department Center   10/11/2022  9:30 AM Elaine Padilla MD [facility]

## 2022-09-28 NOTE — SUBJECTIVE & OBJECTIVE
Interval History: Patient doing well today, complains of some nerve pain - home norco reordered. Working well with therapy, no longer having any dysuria. Waiting on insurance authorization for SNF placement.     Review of Systems   Respiratory:  Negative for shortness of breath.    Cardiovascular:  Negative for chest pain and leg swelling.   Neurological:  Positive for weakness. Negative for dizziness and headaches.   Objective:     Vital Signs (Most Recent):  Temp: 97.4 °F (36.3 °C) (09/28/22 0752)  Pulse: 98 (09/28/22 0822)  Resp: 18 (09/28/22 0752)  BP: 123/63 (09/28/22 0752)  SpO2: (!) 93 % (09/28/22 0822)   Vital Signs (24h Range):  Temp:  [97.4 °F (36.3 °C)-98.4 °F (36.9 °C)] 97.4 °F (36.3 °C)  Pulse:  [] 98  Resp:  [2-22] 18  SpO2:  [92 %-100 %] 93 %  BP: (101-176)/(63-89) 123/63     Weight: 122.5 kg (270 lb)  Body mass index is 46.35 kg/m².  No intake or output data in the 24 hours ending 09/28/22 1019     Physical Exam  Vitals reviewed.   Constitutional:       Appearance: Normal appearance.   HENT:      Head: Normocephalic and atraumatic.   Eyes:      General: No scleral icterus.     Conjunctiva/sclera: Conjunctivae normal.   Pulmonary:      Effort: Pulmonary effort is normal. No respiratory distress.   Skin:     Coloration: Skin is not jaundiced.      Findings: No erythema.   Psychiatric:         Mood and Affect: Mood normal.         Behavior: Behavior normal.       Significant Labs: All pertinent labs within the past 24 hours have been reviewed.  Recent Lab Results         09/28/22  0259   09/27/22  1058        Anion Gap 13         BUN 24         Calcium 9.4         Chloride 96         CO2 27         Creatinine 1.2         eGFR 46         Glucose 246         POCT Glucose   238       Potassium 3.5         Sodium 136                 Significant Imaging: I have reviewed all pertinent imaging results/findings within the past 24 hours.

## 2022-09-28 NOTE — PROGRESS NOTES
[facility] Medicine  Telemedicine Progress Note    Patient Name: Jayne You  MRN: [id number]  Patient Class: OP- Observation   Admission Date: 9/23/2022  Length of Stay: 1 days  Attending Physician: Joie Lazo MD  Primary Care Provider: Mehrdad Cisneros MD          Subjective:     Principal Problem:Acute encephalopathy        HPI:  Jayne You is an 82-year-old female with a history of diabetes, cardiomyopathy, hypertension, Back pain, Hyperlipidemia, Arthritis, Sleep apnea, and Morbid obesity. She presented to the ED for evaluation of acute encephalopathy. She was having confusion since 3:00 p.m. yesterday when she called her son. She went to see her primary care physician yesterday morning. She was normal last at 6:00 a.m. As per family. Her son claims she called and sounded confused and did know where she was located. At 6:00 p.m. she was driving [place] when she stopped in the middle of the road. EMS was called for her. Patient claimed she was weak and not feeling well and was asking the same questions repeatedly with inability to remember things.    On arrival to ED adequate neuro exam was done and old stroke was called. NIH stroke scale 2. Patient mainly is confused and has slurring of speech. Lab work pretty unremarkable, case discussed with vascular neurologist who thought this may be stroke mimic and recommended a CTA with was without acute abnormality. CTH negative. Admit to [facility] Medicine for further care and neurology evaluation.      Overview/Hospital Course:  Patient admitted with stroke like symptoms however CVA workup was negative. PT/OT consulted and recommended SNF placement which patient and daughter were agreeable to. Patient complained of dysuria on 9/26/22 prompting U/A which revealed UTI and patient started on IV Ceftriaxone while urine culture in process.       Interval History: Patient doing well today, complains of some nerve pain - home  norco reordered. Working well with therapy, no longer having any dysuria. Waiting on insurance authorization for SNF placement.     Review of Systems   Respiratory:  Negative for shortness of breath.    Cardiovascular:  Negative for chest pain and leg swelling.   Neurological:  Positive for weakness. Negative for dizziness and headaches.   Objective:     Vital Signs (Most Recent):  Temp: 97.4 °F (36.3 °C) (09/28/22 0752)  Pulse: 98 (09/28/22 0822)  Resp: 18 (09/28/22 0752)  BP: 123/63 (09/28/22 0752)  SpO2: (!) 93 % (09/28/22 0822)   Vital Signs (24h Range):  Temp:  [97.4 °F (36.3 °C)-98.4 °F (36.9 °C)] 97.4 °F (36.3 °C)  Pulse:  [] 98  Resp:  [2-22] 18  SpO2:  [92 %-100 %] 93 %  BP: (101-176)/(63-89) 123/63     Weight: 122.5 kg (270 lb)  Body mass index is 46.35 kg/m².  No intake or output data in the 24 hours ending 09/28/22 1019     Physical Exam  Vitals reviewed.   Constitutional:       Appearance: Normal appearance.   HENT:      Head: Normocephalic and atraumatic.   Eyes:      General: No scleral icterus.     Conjunctiva/sclera: Conjunctivae normal.   Pulmonary:      Effort: Pulmonary effort is normal. No respiratory distress.   Skin:     Coloration: Skin is not jaundiced.      Findings: No erythema.   Psychiatric:         Mood and Affect: Mood normal.         Behavior: Behavior normal.       Significant Labs: All pertinent labs within the past 24 hours have been reviewed.  Recent Lab Results         09/28/22  0259   09/27/22  1058        Anion Gap 13         BUN 24         Calcium 9.4         Chloride 96         CO2 27         Creatinine 1.2         eGFR 46         Glucose 246         POCT Glucose   238       Potassium 3.5         Sodium 136                 Significant Imaging: I have reviewed all pertinent imaging results/findings within the past 24 hours.      Assessment/Plan:      * Acute encephalopathy  -Evaluated by vascular neurology for stroke  -Per neurologist: Confusion with no focal symptoms.  Likely stroke mimic. Obtain CTA  -CTA: No acute abnormality. No high-grade stenosis or major vessel occlusion.  -unsure etiology  -CBG level 230  -CT head negative  -Labs reviewed and noted  -UA & UDS negative  -TSH 1.940  B12, folate, ETOH, ammonia   -Avoid anticholinergics if possible  -Delirium precautions  -consult neurology  -back to baseline    UTI (urinary tract infection)  Patient complained of dysuria on 9/26/22 prompting U/A which revealed UTI and patient started on IV Ceftriaxone while urine culture in process.       Stroke-like symptoms  -rule out stroke  -CTA brain negative  -CTH negative  -MRI negative  -ECHO   Antithrombotics for secondary stroke prevention: Antiplatelets: Aspirin: 81 mg daily    Statins for secondary stroke prevention and hyperlipidemia, if present:   Statins: Atorvastatin- 40 mg daily    Aggressive risk factor modification: HTN, DM, HLD, Obesity     Rehab efforts: The patient has been evaluated by a stroke team provider and the therapy needs have been fully considered based off the presenting complaints and exam findings. The following therapy evaluations are needed: None    Diagnostics ordered/pending: CTA Head to assess vasculature , HgbA1C to assess blood glucose levels, Lipid Profile to assess cholesterol levels, MRI head without contrast to assess brain parenchyma, TTE to assess cardiac function/status , TSH to assess thyroid function    VTE prophylaxis: Enoxaparin 40 mg SQ every 24 hours    BP parameters: Infarct: No intervention, SBP <220    Sleep apnea  -CPAP QHS    Morbid obesity  There is no height or weight on file to calculate BMI. Morbid obesity complicates all aspects of disease management from diagnostic modalities to treatment. Weight loss encouraged and health benefits explained to patient.     Hyperlipidemia  -Resume home statin    Type 2 diabetes mellitus without complication, without long-term current use of insulin  Patient's FSGs are uncontrolled due to  hyperglycemia on current medication regimen.  Last A1c reviewed-   Lab Results   Component Value Date    HGBA1C 9.2 (H) 05/21/2022     Most recent fingerstick glucose reviewed-   Recent Labs   Lab 09/23/22  1903   POCTGLUCOSE 230*     Current correctional scale  Low  Maintain anti-hyperglycemic dose as follows-   Antihyperglycemics (From admission, onward)    None        Hold Oral hypoglycemics while patient is in the hospital.    Essential hypertension  -hypertensive in ED  -given Antihypertensives in ED  -hold antihypertensives and allow permissive HTN until evaluated by Neurology  -PRN labetalol per stroke protocol      VTE Risk Mitigation (From admission, onward)         Ordered     enoxaparin injection 40 mg  Every 12 hours         09/26/22 1009     IP VTE HIGH RISK PATIENT  Once         09/24/22 0333     Place sequential compression device  Until discontinued         09/24/22 0332                      I have completed this tele-visit without the assistance of a telepresenter.    The attending portion of this evaluation, treatment, and documentation was performed per Joie Lazo MD via Telemedicine AudioVisual using the secure [employer] software platform with 2 way audio/video. The provider was located off-site and the patient is located in the hospital. The aforementioned video software was utilized to document the relevant history and physical exam    Joie Lazo MD  Department of Hospital Medicine   [place] [facility]

## 2022-09-28 NOTE — PT/OT/SLP PROGRESS
Physical Therapy Treatment    Patient Name:  Jayne You   MRN:  [id number]    Recommendations:     Discharge Recommendations:  nursing facility, skilled   Discharge Equipment Recommendations:  (TBD)   Barriers to discharge: Decreased caregiver support and decreased mobility,strength and endurance    Assessment:     Jayne You is a 82 y.o. female admitted with a medical diagnosis of Acute encephalopathy.  She presents with the following impairments/functional limitations:  weakness, impaired endurance, impaired functional mobility, gait instability, impaired balance, decreased upper extremity function, decreased lower extremity function, decreased ROM, impaired coordination,pt with good participation and requires assistance with all mobility at this time,pt will benefit from SNF upon discharge    Rehab Prognosis: Good; patient would benefit from acute skilled PT services to address these deficits and reach maximum level of function.    Recent Surgery: * No surgery found *      Plan:     During this hospitalization, patient to be seen 6 x/week to address the identified rehab impairments via gait training, therapeutic activities, therapeutic exercises, neuromuscular re-education and progress toward the following goals:    Plan of Care Expires:  10/24/22    Subjective     Chief Complaint: n/a  Patient/Family Comments/goals: pt agreeable to up in c.hair  Pain/Comfort:  Pain Rating 1: 0/10      Objective:     Communicated with nsg prior to session.  Patient found supine with bed alarm, PureWick, peripheral IV, telemetry upon PT entry to room.     General Precautions: Standard, aspiration, diabetic, fall   Orthopedic Precautions:N/A   Braces: N/A  Respiratory Status: Room air     Functional Mobility:  Bed Mobility:     Supine to Sit: moderate assistance  Transfers:     Sit to Stand:  minimum assistance with rolling walker  Bed to Chair: stand by assistance and contact guard assistance with  rolling walker  using   ambulation  Gait: amb ~45' with RW and SBA/CGA  Balance: fair standing  balance with RW      AM-PAC 6 CLICK MOBILITY  Turning over in bed (including adjusting bedclothes, sheets and blankets)?: 3  Sitting down on and standing up from a chair with arms (e.g., wheelchair, bedside commode, etc.): 3  Moving from lying on back to sitting on the side of the bed?: 2  Moving to and from a bed to a chair (including a wheelchair)?: 3  Need to walk in hospital room?: 3  Climbing 3-5 steps with a railing?: 2  Basic Mobility Total Score: 16       Therapeutic Activities and Exercises: le seated ex's X 10-12 reps inc: ap,laq,hip flex.       Patient left up in chair with all lines intact, call button in reach, bed alarm on, and nsg notified..    GOALS:  see general POC  Multidisciplinary Problems       Physical Therapy Goals          Problem: Physical Therapy    Goal Priority Disciplines Outcome Goal Variances Interventions   Physical Therapy Goal     PT, PT/OT Ongoing, Progressing     Description: Goals to be met by: 10/24/2022     Patient will increase functional independence with mobility by performin. Supine <> sit with Stand-by Assistance  2. Sit<>stand transfer with SBA  3. Bed <>w/c  transfer with SBA using rw  4. Ambulation 75 ft using a rw with SBA                       Time Tracking:     PT Received On: 22  PT Start Time: 1006     PT Stop Time: 1030  PT Total Time (min): 24 min     Billable Minutes: Gait Training 14 and Therapeutic Exercise 10    Treatment Type: Treatment  PT/PTA: PTA     PTA Visit Number: 2     2022

## 2022-09-28 NOTE — PLAN OF CARE
Problem: Occupational Therapy  Goal: Occupational Therapy Goal  Description: Goals to be met by: 10/24/2022      Patient will increase functional independence with ADLs by performing:    UE Dressing with Supervision.  LE Dressing with Supervision.  Grooming while standing with Supervision.  Toileting from toilet with Supervision for hygiene and clothing management.   Toilet transfer to toilet with Supervision.  Increased functional strength to WFL for self care.  Upper extremity exercise program x10 reps per handout, with independence.     Outcome: Ongoing, Progressing

## 2022-09-28 NOTE — PLAN OF CARE
[facility] - Telemetry      HOME HEALTH ORDERS  FACE TO FACE ENCOUNTER    Patient Name: Jayne You  YOB: 1940    PCP: Mehrdad Cisneros MD   PCP Address: [address] / [place]  PCP Phone Number: [phone]  PCP Fax: [phone]    Encounter Date: 9/23/22    Admit to Home Health    Diagnoses:  Active Hospital Problems    Diagnosis  POA    *Acute encephalopathy [G93.40]  Yes    UTI (urinary tract infection) [N39.0]  No    Stroke-like symptoms [R29.90]  Yes    Sleep apnea [G47.30]  Yes    Essential hypertension [I10]  Yes    Type 2 diabetes mellitus without complication, without long-term current use of insulin [E11.9]  Yes    Hyperlipidemia [E78.5]  Yes    Morbid obesity [E66.01]  Yes      Resolved Hospital Problems   No resolved problems to display.       Follow Up Appointments:  Future Appointments   Date Time Provider Department Center   10/11/2022  9:30 AM [facility]       Allergies:  Review of patient's allergies indicates:   Allergen Reactions    Codeine Nausea And Vomiting     Pt reports can take percocet    Hydrocodone-acetaminophen Nausea And Vomiting    Propoxyphene Nausea And Vomiting       Medications: Review discharge medications with patient and family and provide education.    Current Facility-Administered Medications   Medication Dose Route Frequency Provider Last Rate Last Admin    acetaminophen tablet 650 mg  650 mg Oral Q6H PRN Anna Teran NP   650 mg at 09/25/22 2144    amLODIPine tablet 10 mg  10 mg Oral Daily Geovanni Navarro MD   10 mg at 09/28/22 0903    aspirin chewable tablet 81 mg  81 mg Oral Daily Anna Teran NP   81 mg at 09/28/22 0903    atorvastatin tablet 40 mg  40 mg Oral Daily Anna Teran NP   40 mg at 09/28/22 0903    cefTRIAXone (ROCEPHIN) 1 g/50 mL D5W IVPB  1 g Intravenous Q24H Joie Lazo MD   Stopped at 09/27/22 2211    dextrose 10% bolus 125 mL  12.5 g  Intravenous PRN Anna Teran NP        dextrose 10% bolus 250 mL  25 g Intravenous PRN Anna Teran NP        DULoxetine DR capsule 30 mg  30 mg Oral Daily Geovanni Navarro MD   30 mg at 09/28/22 0903    enoxaparin injection 40 mg  40 mg Subcutaneous Q12H Joie Lazo MD   40 mg at 09/28/22 0903    gabapentin capsule 300 mg  300 mg Oral TID Joie Lazo MD   300 mg at 09/28/22 0903    glucagon (human recombinant) injection 1 mg  1 mg Intramuscular PRN Anna Teran NP        glucose chewable tablet 16 g  16 g Oral PRN Anna Teran NP        glucose chewable tablet 24 g  24 g Oral PRN Anna Teran NP        hydroCHLOROthiazide tablet 12.5 mg  12.5 mg Oral Daily Joie Lazo MD   12.5 mg at 09/28/22 0903    insulin aspart U-100 pen 0-5 Units  0-5 Units Subcutaneous QID (AC + HS) PRN Anna Teran NP   2 Units at 09/27/22 1144    labetaloL injection 10 mg  10 mg Intravenous Q15 Min PRN Anna Teran NP        losartan tablet 25 mg  25 mg Oral Daily Geovanni Navarro MD   25 mg at 09/28/22 0903    melatonin tablet 6 mg  6 mg Oral Nightly PRN Marco Agarwal MD   6 mg at 09/25/22 2144    ondansetron injection 4 mg  4 mg Intravenous Q12H PRN Anna Teran NP        sodium chloride 0.9% bolus 500 mL  500 mL Intravenous Continuous PRN Anna Teran NP        sodium chloride 0.9% flush 10 mL  10 mL Intravenous PRN Anna Teran NP        sodium chloride 0.9% flush 10 mL  10 mL Intravenous PRN Marco Agarwal MD        traMADoL tablet 50 mg  50 mg Oral Q6H PRN Joie Lazo MD         Current Discharge Medication List        START taking these medications    Details   ciprofloxacin HCl (CIPRO) 500 MG tablet Take 1 tablet (500 mg total) by mouth every 12 (twelve) hours. for 3 days  Qty: 6 tablet, Refills: 0           CONTINUE these medications which have CHANGED    Details   aspirin 81 MG Chew Take 1 tablet (81 mg total)  by mouth once daily.  Qty: 30 tablet, Refills: 0      furosemide (LASIX) 20 MG tablet Take 1 tablet (20 mg total) by mouth 2 (two) times a day.  Qty: 60 tablet, Refills: 0      rosuvastatin (CRESTOR) 20 MG tablet Take 1 tablet (20 mg total) by mouth once daily.  Qty: 90 tablet, Refills: 0           CONTINUE these medications which have NOT CHANGED    Details   acetaminophen (TYLENOL) 650 MG TbSR Tylenol arthritis 650 mg by mouth twice daily as needed for pain.  Refills: 0      albuterol (PROVENTIL/VENTOLIN HFA) 90 mcg/actuation inhaler albuterol sulfate HFA 90 mcg/actuation aerosol inhaler   INHALE 2 PUFFS BY MOUTH EVERY 6 HOURS AS NEEDED FOR COUGH OR WHEEZING OR SHORTNESS OF BREATH      albuterol-ipratropium (DUO-NEB) 2.5 mg-0.5 mg/3 mL nebulizer solution SMARTSI Vial(s) By Mouth Every 4-6 Hours PRN      baclofen (LIORESAL) 10 MG tablet Take 1 tablet (10 mg total) by mouth every evening.      DULoxetine (CYMBALTA) 30 MG capsule Take 30 mg by mouth once daily.      ergocalciferol (ERGOCALCIFEROL) 50,000 unit Cap ergocalciferol (vitamin D2) 1,250 mcg (50,000 unit) capsule   TAKE 1 CAPSULE BY MOUTH 1 TIME A WEEK      gabapentin (NEURONTIN) 300 MG capsule Take 300 mg by mouth 3 (three) times daily.      glipiZIDE (GLUCOTROL) 5 MG tablet Take 5 mg by mouth 2 (two) times daily with meals.      losartan (COZAAR) 100 MG tablet Take 100 mg by mouth once daily.      melatonin (MELATIN) 3 mg tablet Take 2 tablets (6 mg total) by mouth nightly as needed for Insomnia.  Refills: 0      omeprazole (PRILOSEC) 20 MG capsule Take 20 mg by mouth as needed.      traMADoL (ULTRAM) 50 mg tablet tramadol 50 mg tablet   TAKE 2 TABLETS BY MOUTH THREE TIMES DAILY.      amLODIPine (NORVASC) 10 MG tablet Take 10 mg by mouth once daily.      benzonatate (TESSALON) 100 MG capsule Take 100 mg by mouth 3 (three) times daily as needed for Cough.      diclofenac sodium (VOLTAREN) 1 % Gel Apply 2 g topically 2 (two) times daily. To left  shoulder  Qty: 100 g, Refills: 1      HYDROcodone-acetaminophen (NORCO)  mg per tablet hydrocodone 10 mg-acetaminophen 325 mg tablet   TAKE 1 TABLET BY MOUTH EVERY 6 TO 8 HOURS AS NEEDED FOR PAIN      levocetirizine (XYZAL) 5 MG tablet levocetirizine 5 mg tablet      neomycin-polymyxin-hydrocortisone (CORTISPORIN) 3.5-10,000-1 mg/mL-unit/mL-% otic suspension neomycin-polymyxin-hydrocort 3.5 mg-10,000 unit/mL-1 % ear drops,susp   SHAKE LIQUID AND INSTILL 1 DROP TO AFFECTED EAR FOUR TIMES DAILY FOR 10 DAYS      prednisoLONE acetate (PRED FORTE) 1 % DrpS 1 drop 4 (four) times daily.      pregabalin (LYRICA) 100 MG capsule pregabalin 100 mg capsule   TAKE ONE CAPSULE BY MOUTH TWICE DAILY      zolpidem (AMBIEN) 10 mg Tab zolpidem 10 mg tablet   TAKE 1 TABLET BY MOUTH EVERY DAY AT BEDTIME AS NEEDED           STOP taking these medications       amoxicillin (AMOXIL) 500 MG capsule Comments:   Reason for Stopping:         hydroCHLOROthiazide (HYDRODIURIL) 12.5 MG Tab Comments:   Reason for Stopping:                 I have seen and examined this patient within the last 30 days. My clinical findings that support the need for the home health skilled services and home bound status are the following:no   Weakness/numbness causing balance and gait disturbance due to Weakness/Debility making it taxing to leave home.     Diet:   diabetic diet 2000 calorie    Labs:  N/a    Referrals/ Consults  Physical Therapy to evaluate and treat. Evaluate for home safety and equipment needs; Establish/upgrade home exercise program. Perform / instruct on therapeutic exercises, gait training, transfer training, and Range of Motion.  Occupational Therapy to evaluate and treat. Evaluate home environment for safety and equipment needs. Perform/Instruct on transfers, ADL training, ROM, and therapeutic exercises.   to evaluate for community resources/long-range planning.  Aide to provide assistance with personal care, ADLs, and vital  signs.    Activities:   activity as tolerated    Nursing:   Agency to admit patient within 24 hours of hospital discharge unless specified on physician order or at patient request    SN to complete comprehensive assessment including routine vital signs. Instruct on disease process and s/s of complications to report to MD. Review/verify medication list sent home with the patient at time of discharge  and instruct patient/caregiver as needed. Frequency may be adjusted depending on start of care date.     Skilled nurse to perform up to 3 visits PRN for symptoms related to diagnosis    Notify MD if SBP > 160 or < 90; DBP > 90 or < 50; HR > 120 or < 50; Temp > 101; O2 < 88%; Other:       Ok to schedule additional visits based on staff availability and patient request on consecutive days within the home health episode.    When multiple disciplines ordered:    Start of Care occurs on Sunday - Wednesday schedule remaining discipline evaluations as ordered on separate consecutive days following the start of care.    Thursday SOC -schedule subsequent evaluations Friday and Monday the following week.     Friday - Saturday SOC - schedule subsequent discipline evaluations on consecutive days starting Monday of the following week.    For all post-discharge communication and subsequent orders please contact patient's primary care physician.   Miscellaneous   Diabetic Care:   SN to perform and educate Diabetic management with blood glucose monitoring:, Fingerstick blood sugar AC and HS, and Report CBG < 60 or > 350 to physician.    Home Health Aide:  Physical Therapy Three times weekly, Occupational Therapy Three times weekly, Medical Social Work Twice weekly, and Home Health Aide Three times weekly    Wound Care Orders  no    I certify that this patient is confined to her home and needs physical therapy and occupational therapy.

## 2022-09-28 NOTE — PT/OT/SLP PROGRESS
Occupational Therapy   Treatment    Name: Jayne You  MRN: [id number]  Admitting Diagnosis:  Acute encephalopathy     The primary encounter diagnosis was Altered mental status, unspecified altered mental status type. Diagnoses of Stroke, Primary hypertension, and Stroke-like symptoms were also pertinent to this visit.    Recommendations:     Discharge Recommendations: nursing facility, skilled  Discharge Equipment Recommendations:   (TBD)  Barriers to discharge:  Decreased caregiver support    Assessment:     Jayne You is a 82 y.o. female with a medical diagnosis of Acute encephalopathy.  She presents with Performance deficits affecting function are weakness, impaired endurance, impaired self care skills, impaired functional mobility, gait instability, impaired balance, decreased upper extremity function, decreased lower extremity function, decreased coordination, decreased safety awareness, decreased ROM.     Pt. making good progress. Pt ambulated SBA-CGA with RW to bathroom, sink and BS chair, min vc for closer walker proximity. No LOB noted. Pt. Performed toilet t/f with SBA with GB and RW; pt declined toileting. Pt. Performed G/hygiene SBA -CGA washing hands standing at sink standing inside RW-mild posterior LOB. Daughter present and observed tx session for family training. Pt performed BUE AROM ex with supervision. Continue with OT POC.     Rehab Prognosis:  Good; patient would benefit from acute skilled OT services to address these deficits and reach maximum level of function.       Plan:     Patient to be seen 3 x/week to address the above listed problems via self-care/home management, therapeutic activities, therapeutic exercises  Plan of Care Expires: 10/27/22  Plan of Care Reviewed with: patient    Subjective     Pain/Comfort:  Pain Rating 1: 0/10  Pain Rating Post-Intervention 1: 0/10    Objective:     Communicated with: nurse prior to session.  Patient found HOB elevated with peripheral IV,  [employer], telemetry, bed alarm (AVASYS) upon OT entry to room.    General Precautions: Standard, diabetic, fall, aspiration   Orthopedic Precautions:N/A   Braces: N/A  Respiratory Status: Room air     Occupational Performance:     Bed Mobility:    Patient completed Rolling/Turning to Left with  supervision  Patient completed Scooting/Bridging with supervision  Patient completed Supine to Sit with supervision     Functional Mobility/Transfers:  Patient completed Sit <> Stand Transfer with contact guard assistance  with  rolling walker   Patient completed Bed <> Chair Transfer using Step Transfer technique with stand by assistance with rolling walker  Patient completed Toilet Transfer Step Transfer technique with stand by assistance with  rolling walker  Functional Mobility: pt ambulated SBA-CGA with RW to bathroom, sink and BS chair, min vc for closer walker proximity    Activities of Daily Living:  Grooming: stand by assistance washed hand standing inside RW at sink, CGA x 1 bout of mild posterior LOB      AMPAC 6 Click ADL: 20    Treatment & Education:  Purpose of OT and POC  See ADLS above  All questions/concerns addressed within scope  Impt of OOB activity and siting UIC explained to pt and daughter, pt agreeable to sit UIC for dinner meal    Patient left up in chair with all lines intact, call button in reach, bed alarm on, and daughter present    GOALS:   Multidisciplinary Problems       Occupational Therapy Goals          Problem: Occupational Therapy    Goal Priority Disciplines Outcome Interventions   Occupational Therapy Goal     OT, PT/OT Ongoing, Progressing    Description: Goals to be met by: 10/24/2022      Patient will increase functional independence with ADLs by performing:    UE Dressing with Supervision.  LE Dressing with Supervision.  Grooming while standing with Supervision.  Toileting from toilet with Supervision for hygiene and clothing management.   Toilet transfer to toilet with  Supervision.  Increased functional strength to WFL for self care.  Upper extremity exercise program x10 reps per handout, with independence.                          Time Tracking:     OT Date of Treatment: 09/27/22  OT Start Time: 1508  OT Stop Time: 1525  OT Total Time (min): 17 min    Billable Minutes:Self Care/Home Management 17  Total Time 17    OT/KRISTINA: OT     KRISTINA Visit Number: 0    9/27/2022

## 2022-09-28 NOTE — PROGRESS NOTES
[facility]                    Pharmacy       Discharge Medication Education    Patient ACCEPTED medication education. Pharmacy has provided education on the name, indication, and possible side effects of the medication(s) prescribed, using teach-back method.     The following medications have also been discussed, during this admission.        Medication List        START taking these medications      ciprofloxacin HCl 500 MG tablet  Commonly known as: CIPRO  Take 1 tablet (500 mg total) by mouth every 12 (twelve) hours. for 3 days            CHANGE how you take these medications      rosuvastatin 20 MG tablet  Commonly known as: CRESTOR  Take 1 tablet (20 mg total) by mouth once daily.  What changed:   medication strength  how much to take            CONTINUE taking these medications      acetaminophen 650 MG Tbsr  Commonly known as: TYLENOL  Tylenol arthritis 650 mg by mouth twice daily as needed for pain.     albuterol 90 mcg/actuation inhaler  Commonly known as: PROVENTIL/VENTOLIN HFA     albuterol-ipratropium 2.5 mg-0.5 mg/3 mL nebulizer solution  Commonly known as: DUO-NEB     amLODIPine 10 MG tablet  Commonly known as: NORVASC     aspirin 81 MG Chew  Take 1 tablet (81 mg total) by mouth once daily.     baclofen 10 MG tablet  Commonly known as: LIORESAL  Take 1 tablet (10 mg total) by mouth every evening.     benzonatate 100 MG capsule  Commonly known as: TESSALON     diclofenac sodium 1 % Gel  Commonly known as: VOLTAREN  Apply 2 g topically 2 (two) times daily. To left shoulder     DULoxetine 30 MG capsule  Commonly known as: CYMBALTA     ergocalciferol 50,000 unit Cap  Commonly known as: ERGOCALCIFEROL     furosemide 20 MG tablet  Commonly known as: LASIX  Take 1 tablet (20 mg total) by mouth 2 (two) times a day.     gabapentin 300 MG capsule  Commonly known as: NEURONTIN     glipiZIDE 5 MG tablet  Commonly known as: GLUCOTROL     HYDROcodone-acetaminophen  mg per tablet  Commonly  known as: NORCO     levocetirizine 5 MG tablet  Commonly known as: XYZAL     losartan 100 MG tablet  Commonly known as: COZAAR     melatonin 3 mg tablet  Commonly known as: MELATIN  Take 2 tablets (6 mg total) by mouth nightly as needed for Insomnia.     neomycin-polymyxin-hydrocortisone 3.5-10,000-1 mg/mL-unit/mL-% otic suspension  Commonly known as: CORTISPORIN     omeprazole 20 MG capsule  Commonly known as: PRILOSEC     prednisoLONE acetate 1 % Drps  Commonly known as: PRED FORTE     pregabalin 100 MG capsule  Commonly known as: LYRICA     traMADoL 50 mg tablet  Commonly known as: ULTRAM     zolpidem 10 mg Tab  Commonly known as: AMBIEN            STOP taking these medications      amoxicillin 500 MG capsule  Commonly known as: AMOXIL     hydroCHLOROthiazide 12.5 MG Tab  Commonly known as: HYDRODIURIL               Where to Get Your Medications        These medications were sent to [facility]  [address] [place] [address]      Hours: Mon-Fri, 8a-5:30p Phone: [phone]   ciprofloxacin HCl 500 MG tablet  rosuvastatin 20 MG tablet       These medications were sent to [id number] - [place] - [address]  [facility] [address]  [address] [place] [address]      Hours: 24-hours Phone: [phone]   aspirin 81 MG Chew  furosemide 20 MG tablet          Thank you  Kolby Matthew, PharmD  [phone]

## 2022-09-28 NOTE — PLAN OF CARE
Problem: Physical Therapy  Goal: Physical Therapy Goal  Description: Goals to be met by: 10/24/2022     Patient will increase functional independence with mobility by performin. Supine <> sit with Stand-by Assistance  2. Sit<>stand transfer with SBA  3. Bed <>w/c  transfer with SBA using rw  4. Ambulation 75 ft using a rw with SBA  Outcome: Ongoing, Progressing

## 2022-09-29 PROCEDURE — G0180 MD CERTIFICATION HHA PATIENT: HCPCS | Mod: ,,, | Performed by: STUDENT IN AN ORGANIZED HEALTH CARE EDUCATION/TRAINING PROGRAM

## 2022-09-29 PROCEDURE — G0180 PR HOME HEALTH MD CERTIFICATION: ICD-10-PCS | Mod: ,,, | Performed by: STUDENT IN AN ORGANIZED HEALTH CARE EDUCATION/TRAINING PROGRAM

## 2022-10-13 NOTE — DISCHARGE SUMMARY
[facility]  Discharge Summary      Patient Name: Jayne You  MRN: [id number]  Patient Class: OP- Observation  Admission Date: 9/23/2022  Hospital Length of Stay: 1 days  Discharge Date and Time: 9/28/2022  2:59 PM  Attending Physician: No att. providers found   Discharging Provider: Joie Lazo MD  Primary Care Provider: Mehrdad Cisneros MD      HPI:   Jayne You is an 82-year-old female with a history of diabetes, cardiomyopathy, hypertension, Back pain, Hyperlipidemia, Arthritis, Sleep apnea, and Morbid obesity. She presented to the ED for evaluation of acute encephalopathy. She was having confusion since 3:00 p.m. yesterday when she called her son. She went to see her primary care physician yesterday morning. She was normal last at 6:00 a.m. As per family. Her son claims she called and sounded confused and did know where she was located. At 6:00 p.m. she was driving [place] when she stopped in the middle of the road. EMS was called for her. Patient claimed she was weak and not feeling well and was asking the same questions repeatedly with inability to remember things.    On arrival to ED adequate neuro exam was done and old stroke was called. NIH stroke scale 2. Patient mainly is confused and has slurring of speech. Lab work pretty unremarkable, case discussed with vascular neurologist who thought this may be stroke mimic and recommended a CTA with was without acute abnormality. CTH negative. Admit to [facility] for further care and neurology evaluation.      * No surgery found *      Hospital Course:   Patient admitted with stroke like symptoms however CVA workup was negative. PT/OT consulted and recommended SNF placement which patient and daughter were agreeable to. Patient complained of dysuria on 9/26/22 prompting U/A which revealed UTI and patient started on IV Ceftriaxone while urine culture in process. Discharged on PO ciprofloxacin.        Goals  of Care Treatment Preferences:  Code Status: Full Code      Consults:   Consults (From admission, onward)        Status Ordering Provider     Inpatient virtual consult to Hospital Medicine  Once        Provider:  (Not yet assigned)    Completed MARY ALICE ALICIA     Inpatient consult to [facility]  Once        Provider:  (Not yet assigned)    Completed PERRY VITAL     Inpatient consult to Registered Dietitian/Nutritionist  Once        Provider:  (Not yet assigned)    Completed PERRY VITAL          No new Assessment & Plan notes have been filed under this hospital service since the last note was generated.  Service: Hospital Medicine    Final Active Diagnoses:    Diagnosis Date Noted POA    PRINCIPAL PROBLEM:  Acute encephalopathy [G93.40] 09/24/2022 Yes    UTI (urinary tract infection) [N39.0] 09/27/2022 No    Stroke-like symptoms [R29.90] 09/24/2022 Yes    Sleep apnea [G47.30] 05/20/2022 Yes    Essential hypertension [I10] 06/16/2021 Yes    Type 2 diabetes mellitus without complication, without long-term current use of insulin [E11.9] 06/16/2021 Yes    Hyperlipidemia [E78.5] 06/16/2021 Yes    Morbid obesity [E66.01] 06/16/2021 Yes      Problems Resolved During this Admission:       Discharged Condition: stable    Disposition: Home or Self Care    Follow Up:   Follow-up Information     [facility]. Go on 10/11/2022.    Specialty: [facility]  Why: FOLLOW UP WITH PCP AFTER [facility] APPT  Contact information:  [address]  [facility] [address] [phone]  Additional information:  Please park in [address] or D and use [employer] [facility] Bldg elevators.                     Patient Instructions:      Diet Cardiac     Notify your health care provider if you experience any of the following:  persistent nausea and vomiting or diarrhea     Notify your health care provider if you experience any of the following:  severe  uncontrolled pain     Notify your health care provider if you experience any of the following:  difficulty breathing or increased cough     Notify your health care provider if you experience any of the following:  persistent dizziness, light-headedness, or visual disturbances     Notify your health care provider if you experience any of the following:  increased confusion or weakness     Activity as tolerated       Significant Diagnostic Studies: Labs: All labs within the past 24 hours have been reviewed    Pending Diagnostic Studies:     None         Medications:  Reconciled Home Medications:      Medication List      CHANGE how you take these medications    rosuvastatin 20 MG tablet  Commonly known as: CRESTOR  Take 1 tablet (20 mg total) by mouth once daily.  What changed:   · medication strength  · how much to take        CONTINUE taking these medications    acetaminophen 650 MG Tbsr  Commonly known as: TYLENOL  Tylenol arthritis 650 mg by mouth twice daily as needed for pain.     albuterol 90 mcg/actuation inhaler  Commonly known as: PROVENTIL/VENTOLIN HFA  albuterol sulfate HFA 90 mcg/actuation aerosol inhaler   INHALE 2 PUFFS BY MOUTH EVERY 6 HOURS AS NEEDED FOR COUGH OR WHEEZING OR SHORTNESS OF BREATH     albuterol-ipratropium 2.5 mg-0.5 mg/3 mL nebulizer solution  Commonly known as: DUO-NEB  SMARTSI Vial(s) By Mouth Every 4-6 Hours PRN     amLODIPine 10 MG tablet  Commonly known as: NORVASC  Take 10 mg by mouth once daily.     aspirin 81 MG Chew  Take 1 tablet (81 mg total) by mouth once daily.     baclofen 10 MG tablet  Commonly known as: LIORESAL  Take 1 tablet (10 mg total) by mouth every evening.     benzonatate 100 MG capsule  Commonly known as: TESSALON  Take 100 mg by mouth 3 (three) times daily as needed for Cough.     diclofenac sodium 1 % Gel  Commonly known as: VOLTAREN  Apply 2 g topically 2 (two) times daily. To left shoulder     DULoxetine 30 MG capsule  Commonly known as: CYMBALTA  Take 30  mg by mouth once daily.     ergocalciferol 50,000 unit Cap  Commonly known as: ERGOCALCIFEROL  ergocalciferol (vitamin D2) 1,250 mcg (50,000 unit) capsule   TAKE 1 CAPSULE BY MOUTH 1 TIME A WEEK     furosemide 20 MG tablet  Commonly known as: LASIX  Take 1 tablet (20 mg total) by mouth 2 (two) times a day.     gabapentin 300 MG capsule  Commonly known as: NEURONTIN  Take 300 mg by mouth 3 (three) times daily.     glipiZIDE 5 MG tablet  Commonly known as: GLUCOTROL  Take 5 mg by mouth 2 (two) times daily with meals.     HYDROcodone-acetaminophen  mg per tablet  Commonly known as: NORCO  hydrocodone 10 mg-acetaminophen 325 mg tablet   TAKE 1 TABLET BY MOUTH EVERY 6 TO 8 HOURS AS NEEDED FOR PAIN     levocetirizine 5 MG tablet  Commonly known as: XYZAL  levocetirizine 5 mg tablet     losartan 100 MG tablet  Commonly known as: COZAAR  Take 100 mg by mouth once daily.     melatonin 3 mg tablet  Commonly known as: MELATIN  Take 2 tablets (6 mg total) by mouth nightly as needed for Insomnia.     neomycin-polymyxin-hydrocortisone 3.5-10,000-1 mg/mL-unit/mL-% otic suspension  Commonly known as: CORTISPORIN  neomycin-polymyxin-hydrocort 3.5 mg-10,000 unit/mL-1 % ear drops,susp   SHAKE LIQUID AND INSTILL 1 DROP TO AFFECTED EAR FOUR TIMES DAILY FOR 10 DAYS     omeprazole 20 MG capsule  Commonly known as: PRILOSEC  Take 20 mg by mouth as needed.     prednisoLONE acetate 1 % Drps  Commonly known as: PRED FORTE  1 drop 4 (four) times daily.     pregabalin 100 MG capsule  Commonly known as: LYRICA  pregabalin 100 mg capsule   TAKE ONE CAPSULE BY MOUTH TWICE DAILY     traMADoL 50 mg tablet  Commonly known as: ULTRAM  tramadol 50 mg tablet   TAKE 2 TABLETS BY MOUTH THREE TIMES DAILY.     zolpidem 10 mg Tab  Commonly known as: AMBIEN  zolpidem 10 mg tablet   TAKE 1 TABLET BY MOUTH EVERY DAY AT BEDTIME AS NEEDED        STOP taking these medications    amoxicillin 500 MG capsule  Commonly known as: AMOXIL     hydroCHLOROthiazide  12.5 MG Tab  Commonly known as: HYDRODIURIL        ASK your doctor about these medications    ciprofloxacin HCl 500 MG tablet  Commonly known as: CIPRO  Take 1 tablet (500 mg total) by mouth every 12 (twelve) hours. for 3 days  Ask about: Should I take this medication?            Indwelling Lines/Drains at time of discharge:   Lines/Drains/Airways     None                 Time spent on the discharge of patient: > 35 minutes         The attending portion of this evaluation, treatment, and documentation was performed per Joie Lazo MD via Telemedicine AudioVisual using the secure [employer]) software platform with 2 way audio/video. The provider was located off-site and the patient is located in the hospital. The aforementioned video software was utilized to document the relevant history and physical exam    Joie Lazo MD  Department of [facility] Medicine  [facility]

## 2022-10-19 ENCOUNTER — EXTERNAL HOME HEALTH (OUTPATIENT)
Dept: HOME HEALTH SERVICES | Facility: HOSPITAL | Age: 82
End: 2022-10-19
Payer: MEDICARE

## 2022-10-20 ENCOUNTER — DOCUMENT SCAN (OUTPATIENT)
Dept: HOME HEALTH SERVICES | Facility: HOSPITAL | Age: 82
End: 2022-10-20
Payer: MEDICARE

## 2022-10-21 ENCOUNTER — DOCUMENT SCAN (OUTPATIENT)
Dept: HOME HEALTH SERVICES | Facility: HOSPITAL | Age: 82
End: 2022-10-21
Payer: MEDICARE

## 2022-11-28 PROCEDURE — G0179 PR HOME HEALTH MD RECERTIFICATION: ICD-10-PCS | Mod: ,,, | Performed by: STUDENT IN AN ORGANIZED HEALTH CARE EDUCATION/TRAINING PROGRAM

## 2022-11-28 PROCEDURE — G0179 MD RECERTIFICATION HHA PT: HCPCS | Mod: ,,, | Performed by: STUDENT IN AN ORGANIZED HEALTH CARE EDUCATION/TRAINING PROGRAM

## 2023-01-02 PROBLEM — N39.0 UTI (URINARY TRACT INFECTION): Status: RESOLVED | Noted: 2022-09-27 | Resolved: 2023-01-02

## 2023-01-24 ENCOUNTER — EXTERNAL HOME HEALTH (OUTPATIENT)
Dept: HOME HEALTH SERVICES | Facility: HOSPITAL | Age: 83
End: 2023-01-24
Payer: MEDICARE

## 2023-01-27 PROCEDURE — G0179 MD RECERTIFICATION HHA PT: HCPCS | Mod: ,,, | Performed by: STUDENT IN AN ORGANIZED HEALTH CARE EDUCATION/TRAINING PROGRAM

## 2023-01-27 PROCEDURE — G0179 PR HOME HEALTH MD RECERTIFICATION: ICD-10-PCS | Mod: ,,, | Performed by: STUDENT IN AN ORGANIZED HEALTH CARE EDUCATION/TRAINING PROGRAM

## 2023-02-25 ENCOUNTER — EXTERNAL HOME HEALTH (OUTPATIENT)
Dept: HOME HEALTH SERVICES | Facility: HOSPITAL | Age: 83
End: 2023-02-25
Payer: MEDICARE

## 2023-08-22 ENCOUNTER — TELEPHONE (OUTPATIENT)
Dept: PLASTIC SURGERY | Facility: CLINIC | Age: 83
End: 2023-08-22
Payer: MEDICARE

## 2023-08-22 NOTE — TELEPHONE ENCOUNTER
Additional attempt to contact patient, no answer.  Called mobile listed as well as home number listed.

## 2023-08-28 ENCOUNTER — TELEPHONE (OUTPATIENT)
Dept: PLASTIC SURGERY | Facility: CLINIC | Age: 83
End: 2023-08-28
Payer: MEDICARE

## (undated) DEVICE — DRAPE HIP TIBURON 87X115X134

## (undated) DEVICE — SEE MEDLINE ITEM 153151

## (undated) DEVICE — COVER BACK TABLE 72X21

## (undated) DEVICE — SPONGE COTTON TRAY 4X4IN

## (undated) DEVICE — PILLOW SMALL ABDUCTION

## (undated) DEVICE — DRAPE SURG W/TWL 17 5/8X23

## (undated) DEVICE — TRAY FOLEY 16FR INFECTION CONT

## (undated) DEVICE — SOL IRR NACL .9% 1000CC

## (undated) DEVICE — DRESSING N ADH OIL EMUL 3X8

## (undated) DEVICE — GLOVE BIOGEL SKINSENSE PI 8.5

## (undated) DEVICE — DRESSING GAUZE 6PLY 4X4

## (undated) DEVICE — KIT TOTAL HIP OPTIVAC

## (undated) DEVICE — SUT VICRYL 2-0 8-18 CP-2

## (undated) DEVICE — SUT VICRYL+ 1 CTX 18IN VIOL

## (undated) DEVICE — SPONGE GAUZE 16PLY 4X4

## (undated) DEVICE — PAD ABD 8X10 STERILE

## (undated) DEVICE — BLADE SAW SAG 25.40MM X 1.27MM

## (undated) DEVICE — UNDERGLOVE BIOGEL PI SZ 6.5 LF

## (undated) DEVICE — GOWN B1 X-LG X-LONG

## (undated) DEVICE — DRAPE STERI U-SHAPED 47X51IN

## (undated) DEVICE — MASK FLYTE HOOD PEEL AWAY

## (undated) DEVICE — Device

## (undated) DEVICE — NDL 18GA X1 1/2 REG BEVEL

## (undated) DEVICE — ELECTRODE REM PLYHSV RETURN 9

## (undated) DEVICE — TAPE MEDIPORE 3 X 10YD

## (undated) DEVICE — BLADE SURG STAINLESS STEEL #15

## (undated) DEVICE — LAVAGE WOUND BACTISURE 1L BAG

## (undated) DEVICE — STRIP STERI REIN CLSR 1/2X2IN

## (undated) DEVICE — PAD CAST SPECIALIST STRL 6

## (undated) DEVICE — DRESSING ADAPTIC N ADH 3X8IN

## (undated) DEVICE — GLOVE BIOGEL SKINSENSE PI 6.5

## (undated) DEVICE — SEE MEDLINE ITEM 146271

## (undated) DEVICE — SYR 50CC LL

## (undated) DEVICE — SOL 9P NACL IRR PIC IL

## (undated) DEVICE — STAPLER SKIN ROTATING HEAD

## (undated) DEVICE — SEE MEDLINE ITEM 157117

## (undated) DEVICE — PULSAVAC ZIMMER

## (undated) DEVICE — DRAPE INCISE IOBAN 2 23X17IN

## (undated) DEVICE — SEE MEDLINE ITEM 152523

## (undated) DEVICE — BLANKET HYPER ADULT 24X60IN

## (undated) DEVICE — APPLICATOR CHLORAPREP ORN 26ML

## (undated) DEVICE — GLOVE BIOGEL SKINSENSE PI 7.0

## (undated) DEVICE — SEE MEDLINE ITEM 146298

## (undated) DEVICE — STOCKINETTE TUBULAR 2PL 6 X 4

## (undated) DEVICE — GAUZE SPONGE 4X4 12PLY

## (undated) DEVICE — ORTHOCORD W/OS-6

## (undated) DEVICE — TOURNIQUET SB QC DP 34X4IN